# Patient Record
Sex: FEMALE | Race: WHITE | NOT HISPANIC OR LATINO | Employment: PART TIME | URBAN - METROPOLITAN AREA
[De-identification: names, ages, dates, MRNs, and addresses within clinical notes are randomized per-mention and may not be internally consistent; named-entity substitution may affect disease eponyms.]

---

## 2015-08-24 LAB — PAP SMEAR - HIM PATIENT REPORTED: NEGATIVE

## 2017-05-11 ENCOUNTER — OFFICE VISIT (OUTPATIENT)
Dept: FAMILY MEDICINE | Facility: CLINIC | Age: 47
End: 2017-05-11
Payer: COMMERCIAL

## 2017-05-11 VITALS
RESPIRATION RATE: 16 BRPM | TEMPERATURE: 98.6 F | OXYGEN SATURATION: 97 % | SYSTOLIC BLOOD PRESSURE: 142 MMHG | BODY MASS INDEX: 43.09 KG/M2 | WEIGHT: 255 LBS | DIASTOLIC BLOOD PRESSURE: 78 MMHG | HEART RATE: 99 BPM

## 2017-05-11 DIAGNOSIS — M54.2 NECK PAIN: Chronic | ICD-10-CM

## 2017-05-11 DIAGNOSIS — M79.18 MYOFASCIAL PAIN: Chronic | ICD-10-CM

## 2017-05-11 DIAGNOSIS — M79.644 BILATERAL THUMB PAIN: ICD-10-CM

## 2017-05-11 DIAGNOSIS — J98.01 ACUTE BRONCHOSPASM: Primary | ICD-10-CM

## 2017-05-11 DIAGNOSIS — J20.9 ACUTE BRONCHITIS, UNSPECIFIED ORGANISM: ICD-10-CM

## 2017-05-11 DIAGNOSIS — M79.645 BILATERAL THUMB PAIN: ICD-10-CM

## 2017-05-11 DIAGNOSIS — E66.01 MORBID OBESITY DUE TO EXCESS CALORIES (H): ICD-10-CM

## 2017-05-11 DIAGNOSIS — J30.2 SEASONAL ALLERGIC RHINITIS, UNSPECIFIED ALLERGIC RHINITIS TRIGGER: ICD-10-CM

## 2017-05-11 PROCEDURE — 99214 OFFICE O/P EST MOD 30 MIN: CPT | Performed by: PHYSICIAN ASSISTANT

## 2017-05-11 RX ORDER — METHYLPREDNISOLONE 4 MG
TABLET, DOSE PACK ORAL
Qty: 21 TABLET | Refills: 0 | Status: SHIPPED | OUTPATIENT
Start: 2017-05-11 | End: 2017-05-17

## 2017-05-11 RX ORDER — DOXYCYCLINE HYCLATE 100 MG
100 TABLET ORAL 2 TIMES DAILY
Qty: 14 TABLET | Refills: 0 | Status: SHIPPED | OUTPATIENT
Start: 2017-05-11 | End: 2017-07-03

## 2017-05-11 RX ORDER — ALBUTEROL SULFATE 90 UG/1
2 AEROSOL, METERED RESPIRATORY (INHALATION) EVERY 6 HOURS PRN
Qty: 1 INHALER | Refills: 4 | Status: SHIPPED | OUTPATIENT
Start: 2017-05-11 | End: 2018-08-31

## 2017-05-11 RX ORDER — HYDROCODONE BITARTRATE AND ACETAMINOPHEN 5; 325 MG/1; MG/1
1 TABLET ORAL EVERY 6 HOURS PRN
Qty: 30 TABLET | Refills: 0 | Status: SHIPPED | OUTPATIENT
Start: 2017-05-11 | End: 2018-08-31

## 2017-05-11 NOTE — PATIENT INSTRUCTIONS
Bronchospasm (Adult)    Bronchospasm occurs when the airways (bronchial tubes) go into spasm and contract. This makes it hard to breathe and causes wheezing (a high-pitched whistling sound). Bronchospasm can also cause frequent coughing without wheezing.  Bronchospasm is due to irritation, inflammation, or allergic reaction of the airways. People with asthma get bronchospasm. However, not everyone with bronchospasm has asthma.  Being exposed to harmful fumes, a recent case of bronchitis, exercise, or a flare-up of chronic obstructive pulmonary disease (COPD) may cause the airways to spasm. An episode of bronchospasm may last 7 to 14 days. Medicine may be prescribed to relax the airways and prevent wheezing. Antibiotics will be prescribed only if your healthcare provider thinks there is a bacterial infection. Antibiotics do not help a viral infection.  Home care     Drink lots of water or other fluids (at least 10 glasses a day) during an attack. This will loosen lung secretions and make it easier to breathe. If you have heart or kidney disease, check with your doctor before you drink extra fluids.    Take prescribed medicine exactly at the times advised. If you take an inhaled medicine to help with breathing, do not use it more than once every 4 hours, unless told to do so. If prescribed an antibiotic or prednisone, take all of the medicine, even if you are feeling better after a few days.    Do not smoke. Also avoid being exposed to secondhand smoke.    If you were given an inhaler, use it exactly as directed. If you need to use it more often than prescribed, your condition may be getting worse. Contact your healthcare provider.  Follow-up care  Follow up with your healthcare provider, or as advised.   (Note: If you are age 65 or older, have a chronic lung disease or condition that affects your immune system, or you smoke, we recommend getting pneumococcal vaccinations, as well as an influenza vaccination (flu  shot) every autumn. Ask your healthcare provider about this.)  When to seek medical advice  Call your healthcare provider right away if any of these occur:    You need to use your inhalers more often than usual.    You develop a fever of 100.4 F (38 C) or higher.    You are coughing up lots of dark-colored sputum (mucus).    You do not start to improve within 24 hours.  Call 911, or get immediate medical care  Contact emergency services if any of these occur:    Coughing up bloody sputum (mucus)    Chest pain with each breath    Increased wheezing or shortness of breath    2639-5206 [employer]. [address] [place] [address], [place] [address]. All rights reserved. This information is not intended as a substitute for professional medical care. Always follow your healthcare professional's instructions.

## 2017-05-11 NOTE — NURSING NOTE
"Chief Complaint   Patient presents with     URI       Initial /78 (BP Location: Right arm, Patient Position: Chair, Cuff Size: Adult Large)  Pulse 99  Temp 98.6  F (37  C) (Tympanic)  Resp 16  Wt 255 lb (115.7 kg)  SpO2 97%  BMI 43.09 kg/m2 Estimated body mass index is 43.09 kg/(m^2) as calculated from the following:    Height as of 8/31/16: 5' 4.5\" (1.638 m).    Weight as of this encounter: 255 lb (115.7 kg).  Medication Reconciliation: complete    "

## 2017-05-11 NOTE — MR AVS SNAPSHOT
After Visit Summary   5/11/2017    Brenda Renee    MRN: [id number]           Patient Information     Date Of Birth          1970        Visit Information        Provider Department      5/11/2017 2:30 PM Chasidy Bajwa PA-C [facility]        Today's Diagnoses     Acute bronchospasm    -  1    Acute bronchitis, unspecified organism        Seasonal allergic rhinitis, unspecified allergic rhinitis trigger        Neck pain        Myofascial pain        Bilateral thumb pain        Wheezing          Care Instructions      Bronchospasm (Adult)    Bronchospasm occurs when the airways (bronchial tubes) go into spasm and contract. This makes it hard to breathe and causes wheezing (a high-pitched whistling sound). Bronchospasm can also cause frequent coughing without wheezing.  Bronchospasm is due to irritation, inflammation, or allergic reaction of the airways. People with asthma get bronchospasm. However, not everyone with bronchospasm has asthma.  Being exposed to harmful fumes, a recent case of bronchitis, exercise, or a flare-up of chronic obstructive pulmonary disease (COPD) may cause the airways to spasm. An episode of bronchospasm may last 7 to 14 days. Medicine may be prescribed to relax the airways and prevent wheezing. Antibiotics will be prescribed only if your healthcare provider thinks there is a bacterial infection. Antibiotics do not help a viral infection.  Home care     Drink lots of water or other fluids (at least 10 glasses a day) during an attack. This will loosen lung secretions and make it easier to breathe. If you have heart or kidney disease, check with your doctor before you drink extra fluids.    Take prescribed medicine exactly at the times advised. If you take an inhaled medicine to help with breathing, do not use it more than once every 4 hours, unless told to do so. If prescribed an antibiotic or prednisone, take all of the  medicine, even if you are feeling better after a few days.    Do not smoke. Also avoid being exposed to secondhand smoke.    If you were given an inhaler, use it exactly as directed. If you need to use it more often than prescribed, your condition may be getting worse. Contact your healthcare provider.  Follow-up care  Follow up with your healthcare provider, or as advised.   (Note: If you are age 65 or older, have a chronic lung disease or condition that affects your immune system, or you smoke, we recommend getting pneumococcal vaccinations, as well as an influenza vaccination (flu shot) every autumn. Ask your healthcare provider about this.)  When to seek medical advice  Call your healthcare provider right away if any of these occur:    You need to use your inhalers more often than usual.    You develop a fever of 100.4 F (38 C) or higher.    You are coughing up lots of dark-colored sputum (mucus).    You do not start to improve within 24 hours.  Call 911, or get immediate medical care  Contact emergency services if any of these occur:    Coughing up bloody sputum (mucus)    Chest pain with each breath    Increased wheezing or shortness of breath    [id number] [employer]. [address] [place] [address]. All rights reserved. This information is not intended as a substitute for professional medical care. Always follow your healthcare professional's instructions.              Follow-ups after your visit        Who to contact     If you have questions or need follow up information about today's clinic visit or your schedule please contact [facility] directly at [phone].  Normal or non-critical lab and imaging results will be communicated to you by MyChart, letter or phone within 4 business days after the clinic has received the results. If you do not hear from us within 7 days, please contact the clinic through MyChart or phone. If you have a critical or  "abnormal lab result, we will notify you by phone as soon as possible.  Submit refill requests through [employer] or call your pharmacy and they will forward the refill request to us. Please allow 3 business days for your refill to be completed.          Additional Information About Your Visit        [employer] Information     [employer] lets you send messages to your doctor, view your test results, renew your prescriptions, schedule appointments and more. To sign up, go to [email or web address] . Click on \"Log in\" on the left side of the screen, which will take you to the Welcome page. Then click on \"Sign up Now\" on the right side of the page.     You will be asked to enter the access code listed below, as well as some personal information. Please follow the directions to create your username and password.     Your access code is: [email or web address]  Expires: 2017  3:08 PM     Your access code will  in 90 days. If you need help or a new code, please call your [place] clinic or [phone].        Care EveryWhere ID     This is your Care EveryWhere ID. This could be used by other organizations to access your [facility] medical records  [id number]        Your Vitals Were     Pulse Temperature Respirations Pulse Oximetry BMI (Body Mass Index)       99 98.6  F (37  C) (Tympanic) 16 97% 43.09 kg/m2        Blood Pressure from Last 3 Encounters:   17 142/78   16 138/82   11/15/16 154/88    Weight from Last 3 Encounters:   17 255 lb (115.7 kg)   16 250 lb (113.4 kg)   16 248 lb (112.5 kg)              Today, you had the following     No orders found for display         Today's Medication Changes          These changes are accurate as of: 17  4:21 PM.  If you have any questions, ask your nurse or doctor.               Start taking these medicines.        Dose/Directions    doxycycline 100 MG tablet   Commonly known as:  VIBRA-TABS   Used for:  Acute bronchitis, unspecified organism   Started " by:  Chasidy Bajwa PA-C        Dose:  100 mg   Take 1 tablet (100 mg) by mouth 2 times daily   Quantity:  14 tablet   Refills:  0       methylPREDNISolone 4 MG tablet   Commonly known as:  MEDROL DOSEPAK   Used for:  Bilateral thumb pain, Acute bronchitis, unspecified organism   Started by:  Chasidy Bajwa PA-C        Follow package instructions   Quantity:  21 tablet   Refills:  0            Where to get your medicines      These medications were sent to [place] [address] [place] [address] [address]     Phone:  [phone]     albuterol 108 (90 BASE) MCG/ACT Inhaler    doxycycline 100 MG tablet    methylPREDNISolone 4 MG tablet         Some of these will need a paper prescription and others can be bought over the counter.  Ask your nurse if you have questions.     Bring a paper prescription for each of these medications     HYDROcodone-acetaminophen 5-325 MG per tablet                Primary Care Provider Office Phone # Fax #    Chasidy Bajwa PA-C [phone]       [facility] [address]  [facility] [address]        Goals        Diet    Reduce portion size        Exercise    Exercise 3x per week (30 min per time)       Thank you!     Thank you for choosing [facility]  for your care. Our goal is always to provide you with excellent care. Hearing back from our patients is one way we can continue to improve our services. Please take a few minutes to complete the written survey that you may receive in the mail after your visit with us. Thank you!             Your Updated Medication List - Protect others around you: Learn how to safely use, store and throw away your medicines at www.disposemymeds.org.          This list is accurate as of: 5/11/17  4:21 PM.  Always use your most recent med list.                   Brand Name Dispense Instructions for use     albuterol 108 (90 BASE) MCG/ACT Inhaler    albuterol    1 Inhaler    Inhale 2 puffs into the lungs every 6 hours as needed       ALLEGRA 180 MG tablet   Generic drug:  fexofenadine      Take 1 tablet by mouth daily.       doxycycline 100 MG tablet    VIBRA-TABS    14 tablet    Take 1 tablet (100 mg) by mouth 2 times daily       HYDROcodone-acetaminophen 5-325 MG per tablet    NORCO    30 tablet    Take 1 tablet by mouth every 6 hours as needed for moderate to severe pain       ibuprofen 200 MG capsule      Take 200 mg by mouth every 4 hours as needed.       LIDODERM 5 % Patch   Generic drug:  lidocaine          losartan 100 MG tablet    COZAAR    90 tablet    Take 1 tablet (100 mg) by mouth daily       MAGNESIUM GLYCINATE PLUS PO      Take 200 mg by mouth 2 times daily       methylPREDNISolone 4 MG tablet    MEDROL DOSEPAK    21 tablet    Follow package instructions       MULTI-VITAMIN DAILY Tabs      Take  by mouth.       vitamin D 2000 UNITS Caps      Take 2-3 capsules by mouth daily.

## 2017-05-16 ENCOUNTER — TELEPHONE (OUTPATIENT)
Dept: FAMILY MEDICINE | Facility: CLINIC | Age: 47
End: 2017-05-16

## 2017-05-16 NOTE — TELEPHONE ENCOUNTER
Reception transferred tome as she made appt but wanted me to triage for throat swelling.   Patient is on antibiotics- she has stomach upset from antibiotic.   Sweating from prednisone  Has seasonal allergies and ? Cat allergies.  She was visiting someone with cats.   Headaches.   Cough is better but not resolved.  Not coughing up yellow phlegm.  She says her throat hurts.  She talked on and on for 20 minutes so I am not worried about excessive throat swelling.  Her neck is mildly swollen and she is worrying  about mumps.  I told her to put on mask when she enters the building  Ok for her to see [facility] tomorrow.   If increased throat swelling, unable to swallow water go to ED  Jailene Matute RN- Triage [employer]

## 2017-05-17 ENCOUNTER — RADIANT APPOINTMENT (OUTPATIENT)
Dept: GENERAL RADIOLOGY | Facility: CLINIC | Age: 47
End: 2017-05-17
Attending: FAMILY MEDICINE
Payer: COMMERCIAL

## 2017-05-17 ENCOUNTER — OFFICE VISIT (OUTPATIENT)
Dept: FAMILY MEDICINE | Facility: CLINIC | Age: 47
End: 2017-05-17
Payer: COMMERCIAL

## 2017-05-17 VITALS
BODY MASS INDEX: 42.25 KG/M2 | DIASTOLIC BLOOD PRESSURE: 80 MMHG | WEIGHT: 250 LBS | RESPIRATION RATE: 16 BRPM | HEART RATE: 104 BPM | TEMPERATURE: 98 F | OXYGEN SATURATION: 99 % | SYSTOLIC BLOOD PRESSURE: 136 MMHG

## 2017-05-17 DIAGNOSIS — J02.9 ACUTE PHARYNGITIS, UNSPECIFIED ETIOLOGY: Primary | ICD-10-CM

## 2017-05-17 DIAGNOSIS — J06.9 UPPER RESPIRATORY TRACT INFECTION, UNSPECIFIED TYPE: ICD-10-CM

## 2017-05-17 DIAGNOSIS — L98.9 BENIGN SKIN GROWTH: ICD-10-CM

## 2017-05-17 LAB
DEPRECATED S PYO AG THROAT QL EIA: NORMAL
MICRO REPORT STATUS: NORMAL
SPECIMEN SOURCE: NORMAL

## 2017-05-17 PROCEDURE — 99214 OFFICE O/P EST MOD 30 MIN: CPT | Performed by: FAMILY MEDICINE

## 2017-05-17 PROCEDURE — 87081 CULTURE SCREEN ONLY: CPT | Performed by: FAMILY MEDICINE

## 2017-05-17 PROCEDURE — 87880 STREP A ASSAY W/OPTIC: CPT | Performed by: FAMILY MEDICINE

## 2017-05-17 PROCEDURE — 71020 XR CHEST 2 VW: CPT

## 2017-05-17 RX ORDER — CODEINE PHOSPHATE AND GUAIFENESIN 10; 100 MG/5ML; MG/5ML
1 SOLUTION ORAL EVERY 4 HOURS PRN
Qty: 120 ML | Refills: 1 | Status: SHIPPED | OUTPATIENT
Start: 2017-05-17 | End: 2017-07-03

## 2017-05-17 NOTE — MR AVS SNAPSHOT
"              After Visit Summary   5/17/2017    Brenda Renee    MRN: [id number]           Patient Information     Date Of Birth          1970        Visit Information        Provider Department      5/17/2017 2:00 PM Dario Matta MD [facility]        Today's Diagnoses     Acute pharyngitis, unspecified etiology    -  1    Upper respiratory tract infection, unspecified type        Benign skin growth          Care Instructions    We'll continue with her albuterol inhaler.  I will send in a prescription for her for some codeine cough syrup.  Will treat symptomatically otherwise.  She can use ibuprofen/Tylenol for her sore throat.  Salt water gargles.  Hard lozenges to suck on.  We'll see back if she's not improving.        Follow-ups after your visit        Who to contact     If you have questions or need follow up information about today's clinic visit or your schedule please contact [facility] directly at [phone].  Normal or non-critical lab and imaging results will be communicated to you by [employer], letter or phone within 4 business days after the clinic has received the results. If you do not hear from us within 7 days, please contact the clinic through "[employer]t or phone. If you have a critical or abnormal lab result, we will notify you by phone as soon as possible.  Submit refill requests through [employer] or call your pharmacy and they will forward the refill request to us. Please allow 3 business days for your refill to be completed.          Additional Information About Your Visit        [employer] Information     [employer] lets you send messages to your doctor, view your test results, renew your prescriptions, schedule appointments and more. To sign up, go to [email or web address] . Click on \"Log in\" on the left side of the screen, which will take you to the Welcome page. Then click on \"Sign up Now\" on the right side of the " page.     You will be asked to enter the access code listed below, as well as some personal information. Please follow the directions to create your username and password.     Your access code is: [email or web address]  Expires: 8/15/2017  3:58 PM     Your access code will  in 90 days. If you need help or a new code, please call your [facility] clinic or [phone].        Care EveryWhere ID     This is your Care EveryWhere ID. This could be used by other organizations to access your [facility] medical records  [id number]        Your Vitals Were     Pulse Temperature Respirations Pulse Oximetry BMI (Body Mass Index)       104 98  F (36.7  C) (Tympanic) 16 99% 42.25 kg/m2        Blood Pressure from Last 3 Encounters:   17 136/80   17 142/78   16 138/82    Weight from Last 3 Encounters:   17 250 lb (113.4 kg)   17 255 lb (115.7 kg)   16 250 lb (113.4 kg)              We Performed the Following     Beta strep group A culture     Strep, Rapid Screen          Today's Medication Changes          These changes are accurate as of: 17 11:59 PM.  If you have any questions, ask your nurse or doctor.               Start taking these medicines.        Dose/Directions    guaiFENesin-codeine 100-10 MG/5ML Soln solution   Commonly known as:  ROBITUSSIN AC   Used for:  Upper respiratory tract infection, unspecified type   Started by:  Dario Matta MD        Dose:  1 tsp.   Take 5 mLs by mouth every 4 hours as needed for cough   Quantity:  120 mL   Refills:  1            Where to get your medicines      Some of these will need a paper prescription and others can be bought over the counter.  Ask your nurse if you have questions.     Bring a paper prescription for each of these medications     guaiFENesin-codeine 100-10 MG/5ML Soln solution                Primary Care Provider Office Phone # Fax #    Chasidy Bajwa PA-C [phone]       [facility]  [address]  [facility] [address]        Goals        Diet    Reduce portion size        Exercise    Exercise 3x per week (30 min per time)       Thank you!     Thank you for choosing [facility]  for your care. Our goal is always to provide you with excellent care. Hearing back from our patients is one way we can continue to improve our services. Please take a few minutes to complete the written survey that you may receive in the mail after your visit with us. Thank you!             Your Updated Medication List - Protect others around you: Learn how to safely use, store and throw away your medicines at www.disposemymeds.org.          This list is accurate as of: 5/17/17 11:59 PM.  Always use your most recent med list.                   Brand Name Dispense Instructions for use    albuterol 108 (90 BASE) MCG/ACT Inhaler    albuterol    1 Inhaler    Inhale 2 puffs into the lungs every 6 hours as needed       ALLEGRA 180 MG tablet   Generic drug:  fexofenadine      Take 1 tablet by mouth daily.       doxycycline 100 MG tablet    VIBRA-TABS    14 tablet    Take 1 tablet (100 mg) by mouth 2 times daily       guaiFENesin-codeine 100-10 MG/5ML Soln solution    ROBITUSSIN AC    120 mL    Take 5 mLs by mouth every 4 hours as needed for cough       HYDROcodone-acetaminophen 5-325 MG per tablet    NORCO    30 tablet    Take 1 tablet by mouth every 6 hours as needed for moderate to severe pain       ibuprofen 200 MG capsule      Take 200 mg by mouth every 4 hours as needed.       LIDODERM 5 % Patch   Generic drug:  lidocaine          losartan 100 MG tablet    COZAAR    90 tablet    Take 1 tablet (100 mg) by mouth daily       MAGNESIUM GLYCINATE PLUS PO      Take 200 mg by mouth 2 times daily       MULTI-VITAMIN DAILY Tabs      Take  by mouth.       vitamin D 2000 UNITS Caps      Take 2-3 capsules by mouth daily.

## 2017-05-17 NOTE — PATIENT INSTRUCTIONS
We'll continue with her albuterol inhaler.  I will send in a prescription for her for some codeine cough syrup.  Will treat symptomatically otherwise.  She can use ibuprofen/Tylenol for her sore throat.  Salt water gargles.  Hard lozenges to suck on.  We'll see back if she's not improving.

## 2017-05-17 NOTE — PROGRESS NOTES
SUBJECTIVE:                                                    Brenda Renee is a 47 year old female who presents to clinic today for the following health issues:      RESPIRATORY SYMPTOMS      Duration: 2 weeks    Description  sore throat, cough and myalgias    Severity: moderate    Accompanying signs and symptoms: None    History (predisposing factors):  environmental allergies, asthma    Precipitating or alleviating factors: lying down, cough worse at night    Therapies tried and outcome:  Medrol dose pack, doxycycline, allegra for allergies       Musculoskeletal problem/pain      Duration: 2 days    Description  Location: thumbs    Intensity:  moderate    Accompanying signs and symptoms: none    History  Previous similar problem: YES  Previous evaluation:  none    Precipitating or alleviating factors:  Trauma or overuse: no   Aggravating factors include: none    Therapies tried and outcome: was on medrol and felt pretty good       Problem list and histories reviewed & adjusted, as indicated.  Additional history: She is now taking her albuterol inhaler and her breathing has been much better.  Her thumbs actually felt very good while she is on the larger beginning doses of her Medrol Dosepak.  However now they have gone back to being sore again.  She also has a spot on her right anterior shin and she would like examined.  She has not been to the dermatologist yet.    Patient Active Problem List   Diagnosis     Plantar fascial fibromatosis     Rectal tear (H)     Neck pain     Myofascial pain     Microhematuria     Morbid obesity due to excess calories (H)     Dysmenorrhea     Perimenopausal     Essential hypertension with goal blood pressure less than 140/90     Abnormal fasting glucose     Bilateral low back pain without sciatica, unspecified chronicity     Chronic pain syndrome     Contact sensitivity to metal, current reaction     Bilateral hand numbness     Seasonal allergic rhinitis, unspecified  allergic rhinitis trigger     Bilateral thumb pain     Past Surgical History:   Procedure Laterality Date     BREAST BIOPSY, RT/LT  11/10/2008 Abbott/Piper - negative, 05/26/2010 Abbott/Piper benign    right x 2 benign     COLONOSCOPY  1/7/2013    Procedure: COLONOSCOPY;  Colonoscopy;  Surgeon: Veronica Meyers MD;  Location: [facility]     HC TOOTH EXTRACTION W/FORCEP  1999    wisdom teeth     TONSILLECTOMY  1999       Social History   Substance Use Topics     Smoking status: Never Smoker     Smokeless tobacco: Never Used     Alcohol use Yes      Comment: 2-3 per month     Family History   Problem Relation Age of Onset     Hypertension Mother      Arthritis Mother      Hypertension Father      Family History Negative Brother      Arthritis Sister      DIABETES Sister      Neurologic Disorder Sister      Family History Negative Brother      Family History Negative Sister            Reviewed and updated as needed this visit by clinical staff  Tobacco  Allergies  Meds  Med Hx  Surg Hx  Fam Hx  Soc Hx      Reviewed and updated as needed this visit by Provider         ROS:  CONSTITUTIONAL:NEGATIVE for fever, chills, change in weight  INTEGUMENTARY/SKIN: POSITIVE for lumps or bumps right shin  RESP:POSITIVE for cough-non productive and negative for wheezing.  CV: NEGATIVE for chest pain, palpitations or peripheral edema    OBJECTIVE:                                                    /80  Pulse 104  Temp 98  F (36.7  C) (Tympanic)  Resp 16  Wt 250 lb (113.4 kg)  SpO2 99%  BMI 42.25 kg/m2  Body mass index is 42.25 kg/(m^2).  GENERAL APPEARANCE: healthy, alert and no distress  EYES: Eyes grossly normal to inspection, PERRL and conjunctivae and sclerae normal  HENT: ear canals and TM's normal and nose and mouth without ulcers or lesions  NECK: no adenopathy and no asymmetry, masses, or scars  RESP: lungs clear to auscultation - no rales, rhonchi or wheezes  CV: regular rates and rhythm, normal S1 S2, no S3  or S4 and no murmur, click or rub  LYMPHATICS: normal ant/post cervical and supraclavicular nodes  SKIN: There is a very benign-appearing 3 mm slightly raised flesh-colored mole mid shin on the right.  This looks completely benign.    Diagnostic test results:  Results for orders placed or performed in visit on 05/17/17 (from the past 24 hour(s))   Strep, Rapid Screen   Result Value Ref Range    Specimen Description Throat     Rapid Strep A Screen       NEGATIVE: No Group A streptococcal antigen detected by immunoassay, await   culture report.      Micro Report Status FINAL 05/17/2017      CXR - negative to my reading.       ASSESSMENT/PLAN:                                                        ICD-10-CM    1. Acute pharyngitis, unspecified etiology J02.9 Strep, Rapid Screen     Beta strep group A culture   2. Upper respiratory tract infection, unspecified type J06.9 XR Chest 2 Views   3. Benign skin growth L98.9     Right shin       Patient Instructions   We'll continue with her albuterol inhaler.  I will send in a prescription for her for some codeine cough syrup.  Will treat symptomatically otherwise.  She can use ibuprofen/Tylenol for her sore throat.  Salt water gargles.  Hard lozenges to suck on.  We'll see back if she's not improving.      Dario Matta MD  [facility]

## 2017-05-18 LAB
BACTERIA SPEC CULT: NORMAL
MICRO REPORT STATUS: NORMAL
SPECIMEN SOURCE: NORMAL
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
## 2017-07-05 ENCOUNTER — OFFICE VISIT (OUTPATIENT)
Dept: FAMILY MEDICINE | Facility: CLINIC | Age: 47
End: 2017-07-05
Payer: COMMERCIAL

## 2017-07-05 VITALS
BODY MASS INDEX: 42.15 KG/M2 | HEART RATE: 76 BPM | OXYGEN SATURATION: 98 % | SYSTOLIC BLOOD PRESSURE: 124 MMHG | HEIGHT: 65 IN | DIASTOLIC BLOOD PRESSURE: 70 MMHG | RESPIRATION RATE: 16 BRPM | TEMPERATURE: 99.1 F | WEIGHT: 253 LBS

## 2017-07-05 DIAGNOSIS — M79.645 BILATERAL THUMB PAIN: ICD-10-CM

## 2017-07-05 DIAGNOSIS — R79.89 ELEVATED LFTS: ICD-10-CM

## 2017-07-05 DIAGNOSIS — Z00.00 ROUTINE GENERAL MEDICAL EXAMINATION AT A HEALTH CARE FACILITY: Primary | ICD-10-CM

## 2017-07-05 DIAGNOSIS — Z51.81 ENCOUNTER FOR THERAPEUTIC DRUG MONITORING: ICD-10-CM

## 2017-07-05 DIAGNOSIS — R73.01 ABNORMAL FASTING GLUCOSE: ICD-10-CM

## 2017-07-05 DIAGNOSIS — M79.18 MYOFASCIAL PAIN: Chronic | ICD-10-CM

## 2017-07-05 DIAGNOSIS — M79.644 BILATERAL THUMB PAIN: ICD-10-CM

## 2017-07-05 DIAGNOSIS — E66.01 MORBID OBESITY DUE TO EXCESS CALORIES (H): ICD-10-CM

## 2017-07-05 DIAGNOSIS — M72.2 PLANTAR FASCIAL FIBROMATOSIS: Chronic | ICD-10-CM

## 2017-07-05 DIAGNOSIS — I10 ESSENTIAL HYPERTENSION WITH GOAL BLOOD PRESSURE LESS THAN 140/90: Chronic | ICD-10-CM

## 2017-07-05 DIAGNOSIS — M54.2 NECK PAIN: Chronic | ICD-10-CM

## 2017-07-05 LAB
ALBUMIN SERPL-MCNC: 3.6 G/DL (ref 3.4–5)
ALBUMIN UR-MCNC: NEGATIVE MG/DL
ALP SERPL-CCNC: 107 U/L (ref 40–150)
ALT SERPL W P-5'-P-CCNC: 64 U/L (ref 0–50)
ANION GAP SERPL CALCULATED.3IONS-SCNC: 10 MMOL/L (ref 3–14)
APPEARANCE UR: CLEAR
AST SERPL W P-5'-P-CCNC: 49 U/L (ref 0–45)
BILIRUB SERPL-MCNC: 0.5 MG/DL (ref 0.2–1.3)
BILIRUB UR QL STRIP: NEGATIVE
BUN SERPL-MCNC: 10 MG/DL (ref 7–30)
CALCIUM SERPL-MCNC: 8.8 MG/DL (ref 8.5–10.1)
CHLORIDE SERPL-SCNC: 107 MMOL/L (ref 94–109)
CHOLEST SERPL-MCNC: 155 MG/DL
CO2 SERPL-SCNC: 23 MMOL/L (ref 20–32)
COLOR UR AUTO: YELLOW
CREAT SERPL-MCNC: 0.85 MG/DL (ref 0.52–1.04)
GFR SERPL CREATININE-BSD FRML MDRD: 71 ML/MIN/1.7M2
GLUCOSE SERPL-MCNC: 151 MG/DL (ref 70–99)
GLUCOSE UR STRIP-MCNC: NEGATIVE MG/DL
HBA1C MFR BLD: 7 % (ref 4.3–6)
HDLC SERPL-MCNC: 42 MG/DL
HGB UR QL STRIP: ABNORMAL
KETONES UR STRIP-MCNC: NEGATIVE MG/DL
LDLC SERPL CALC-MCNC: 91 MG/DL
LEUKOCYTE ESTERASE UR QL STRIP: NEGATIVE
NITRATE UR QL: NEGATIVE
NON-SQ EPI CELLS #/AREA URNS LPF: NORMAL /LPF
NONHDLC SERPL-MCNC: 113 MG/DL
PH UR STRIP: 5.5 PH (ref 5–7)
POTASSIUM SERPL-SCNC: 4.1 MMOL/L (ref 3.4–5.3)
PROT SERPL-MCNC: 7.6 G/DL (ref 6.8–8.8)
RBC #/AREA URNS AUTO: NORMAL /HPF (ref 0–2)
SODIUM SERPL-SCNC: 140 MMOL/L (ref 133–144)
SP GR UR STRIP: 1.02 (ref 1–1.03)
TRIGL SERPL-MCNC: 110 MG/DL
URN SPEC COLLECT METH UR: ABNORMAL
UROBILINOGEN UR STRIP-ACNC: 0.2 EU/DL (ref 0.2–1)
WBC #/AREA URNS AUTO: NORMAL /HPF (ref 0–2)

## 2017-07-05 PROCEDURE — 80053 COMPREHEN METABOLIC PANEL: CPT | Performed by: PHYSICIAN ASSISTANT

## 2017-07-05 PROCEDURE — 80061 LIPID PANEL: CPT | Performed by: PHYSICIAN ASSISTANT

## 2017-07-05 PROCEDURE — 99213 OFFICE O/P EST LOW 20 MIN: CPT | Mod: 25 | Performed by: PHYSICIAN ASSISTANT

## 2017-07-05 PROCEDURE — 36415 COLL VENOUS BLD VENIPUNCTURE: CPT | Performed by: PHYSICIAN ASSISTANT

## 2017-07-05 PROCEDURE — 83036 HEMOGLOBIN GLYCOSYLATED A1C: CPT | Performed by: PHYSICIAN ASSISTANT

## 2017-07-05 PROCEDURE — 81001 URINALYSIS AUTO W/SCOPE: CPT | Performed by: PHYSICIAN ASSISTANT

## 2017-07-05 PROCEDURE — 99396 PREV VISIT EST AGE 40-64: CPT | Performed by: PHYSICIAN ASSISTANT

## 2017-07-05 RX ORDER — LOSARTAN POTASSIUM 100 MG/1
100 TABLET ORAL DAILY
Qty: 90 TABLET | Refills: 3 | Status: SHIPPED | OUTPATIENT
Start: 2017-07-05 | End: 2018-08-30

## 2017-07-05 RX ORDER — HYDROCODONE BITARTRATE AND ACETAMINOPHEN 5; 325 MG/1; MG/1
1 TABLET ORAL EVERY 6 HOURS PRN
Qty: 30 TABLET | Refills: 0 | Status: CANCELLED | OUTPATIENT
Start: 2017-07-05

## 2017-07-05 RX ORDER — CYCLOBENZAPRINE HCL 10 MG
10 TABLET ORAL 3 TIMES DAILY PRN
Qty: 30 TABLET | Refills: 5 | Status: SHIPPED | OUTPATIENT
Start: 2017-07-05 | End: 2018-08-31

## 2017-07-05 NOTE — MR AVS SNAPSHOT
After Visit Summary   7/5/2017    Brenda Renee    MRN: [id number]           Patient Information     Date Of Birth          1970        Visit Information        Provider Department      7/5/2017 9:50 AM Chasidy Bajwa PA-C [facility]        Today's Diagnoses     Routine general medical examination at a health care facility    -  1    Essential hypertension with goal blood pressure less than 140/90        Morbid obesity due to excess calories (H)        Neck pain        Myofascial pain        Bilateral thumb pain        Encounter for therapeutic drug monitoring        Plantar fascial fibromatosis          Care Instructions      Preventive Health Recommendations  Female Ages 40 to 49    Yearly exam:     See your health care provider every year in order to  1. Review health changes.   2. Discuss preventive care.    3. Review your medicines if your doctor prescribed any.      Get a Pap test every three years (unless you have an abnormal result and your provider advises testing more often).      If you get Pap tests with HPV test, you only need to test every 5 years, unless you have an abnormal result. You do not need a Pap test if your uterus was removed (hysterectomy) and you have not had cancer.      You should be tested each year for STDs (sexually transmitted diseases), if you're at risk.       Ask your doctor if you should have a mammogram.      Have a colonoscopy (test for colon cancer) if someone in your family has had colon cancer or polyps before age 50.       Have a cholesterol test every 5 years.       Have a diabetes test (fasting glucose) after age 45. If you are at risk for diabetes, you should have this test every 3 years.    Shots: Get a flu shot each year. Get a tetanus shot every 10 years.     Nutrition:     Eat at least 5 servings of fruits and vegetables each day.    Eat whole-grain bread, whole-wheat pasta and brown rice instead  of white grains and rice.    Talk to your provider about Calcium and Vitamin D.     Lifestyle    Exercise at least 150 minutes a week (an average of 30 minutes a day, 5 days a week). This will help you control your weight and prevent disease.    Limit alcohol to one drink per day.    No smoking.     Wear sunscreen to prevent skin cancer.    See your dentist every six months for an exam and cleaning.          Follow-ups after your visit        Additional Services     ORTHOPEDICS ADULT REFERRAL       Your provider has referred you to: [facility]: [facility]  [phone]   [email or web address]  [facility] [place] [phone]   [email or web address]    Please be aware that coverage of these services is subject to the terms and limitations of your health insurance plan.  Call member services at your health plan with any benefit or coverage questions.      Please bring the following to your appointment:    >>   Any x-rays, CTs or MRIs which have been performed.  Contact the facility where they were done to arrange for  prior to your scheduled appointment.    >>   List of current medications   >>   This referral request   >>   Any documents/labs given to you for this referral                  Who to contact     If you have questions or need follow up information about today's clinic visit or your schedule please contact [facility] directly at [phone].  Normal or non-critical lab and imaging results will be communicated to you by MyChart, letter or phone within 4 business days after the clinic has received the results. If you do not hear from us within 7 days, please contact the clinic through MyChart or phone. If you have a critical or abnormal lab result, we will notify you by phone as soon as possible.  Submit refill  "requests through [employer] or call your pharmacy and they will forward the refill request to us. Please allow 3 business days for your refill to be completed.          Additional Information About Your Visit        [employer] Information     [employer] lets you send messages to your doctor, view your test results, renew your prescriptions, schedule appointments and more. To sign up, go to [email or web address] . Click on \"Log in\" on the left side of the screen, which will take you to the Welcome page. Then click on \"Sign up Now\" on the right side of the page.     You will be asked to enter the access code listed below, as well as some personal information. Please follow the directions to create your username and password.     Your access code is: [email or web address]  Expires: 8/15/2017  3:58 PM     Your access code will  in 90 days. If you need help or a new code, please call your [place] clinic or [phone].        Care EveryWhere ID     This is your Care EveryWhere ID. This could be used by other organizations to access your [place] medical records  [id number]        Your Vitals Were     Pulse Temperature Respirations Height Pulse Oximetry BMI (Body Mass Index)    76 99.1  F (37.3  C) (Tympanic) 16 5' 4.5\" (1.638 m) 98% 42.76 kg/m2       Blood Pressure from Last 3 Encounters:   17 124/70   17 136/80   17 142/78    Weight from Last 3 Encounters:   17 253 lb (114.8 kg)   17 250 lb (113.4 kg)   17 255 lb (115.7 kg)              We Performed the Following     Comprehensive metabolic panel     Hemoglobin A1c     LIPID REFLEX TO DIRECT LDL PANEL     ORTHOPEDICS ADULT REFERRAL     UA reflex to Microscopic     Urine Microscopic          Today's Medication Changes          These changes are accurate as of: 17 11:35 AM.  If you have any questions, ask your nurse or doctor.               Start taking these medicines.        Dose/Directions    cyclobenzaprine 10 MG tablet   Commonly " known as:  FLEXERIL   Used for:  Neck pain, Myofascial pain, Plantar fascial fibromatosis   Started by:  Chasidy Bajwa PA-C        Dose:  10 mg   Take 1 tablet (10 mg) by mouth 3 times daily as needed   Quantity:  30 tablet   Refills:  5         Stop taking these medicines if you haven't already. Please contact your care team if you have questions.     doxycycline 100 MG tablet   Commonly known as:  VIBRA-TABS   Stopped by:  Chasidy Bajwa PA-C           guaiFENesin-codeine 100-10 MG/5ML Soln solution   Commonly known as:  ROBITUSSIN AC   Stopped by:  Chasidy Bajwa PA-C                Where to get your medicines      These medications were sent to [place] [address] [place] [address] [address] [place] [address] [facility] [address]     Phone:  [phone]     cyclobenzaprine 10 MG tablet    losartan 100 MG tablet                Primary Care Provider Office Phone # Fax #    Chasidy Bajwa PA-C [phone]       [facility] [address] [facility] [address]  [facility] [address]        Goals        Diet    Reduce portion size        Exercise    Exercise 3x per week (30 min per time)       Equal Access to Services     KAREL GAINES : Hadii aad ku hadasho Soomaali, waaxda luqadaha, qaybta kaalmada adeegyada, waxay idiin hayaan adedee dee kharahair laana luisa manuel. So [facility] [phone].    ATENCIÓN: Si habla español, tiene a siu disposición servicios gratuitos de asistencia lingüística. Llame al [phone].    We comply with applicable federal civil rights laws and [place] laws. We do not discriminate on the basis of race, color, national origin, age, disability sex, sexual orientation or gender identity.            Thank you!     Thank you for choosing [facility]  for your care. Our goal is always to provide you with excellent care. Hearing back from our patients is one way we can continue to improve our  services. Please take a few minutes to complete the written survey that you may receive in the mail after your visit with us. Thank you!             Your Updated Medication List - Protect others around you: Learn how to safely use, store and throw away your medicines at www.disposemymeds.org.          This list is accurate as of: 7/5/17 11:35 AM.  Always use your most recent med list.                   Brand Name Dispense Instructions for use Diagnosis    albuterol 108 (90 BASE) MCG/ACT Inhaler    albuterol    1 Inhaler    Inhale 2 puffs into the lungs every 6 hours as needed    Acute bronchospasm       ALLEGRA 180 MG tablet   Generic drug:  fexofenadine      Take 1 tablet by mouth daily.    Somatic dysfunction of lower extremities, Somatic dysfunction of sacral region, Somatic dysfunction of pelvic region, Somatic dysfunction of lumbar region, Somatic dysfunction of thoracic region, Segmental and somatic dysfunction of rib cage, Somatic dysfunction of upper extremities, Somatic dysfunction of cervical region, Somatic dysfunction of spine affecting head region       cyclobenzaprine 10 MG tablet    FLEXERIL    30 tablet    Take 1 tablet (10 mg) by mouth 3 times daily as needed    Neck pain, Myofascial pain, Plantar fascial fibromatosis       HYDROcodone-acetaminophen 5-325 MG per tablet    NORCO    30 tablet    Take 1 tablet by mouth every 6 hours as needed for moderate to severe pain    Neck pain, Myofascial pain       ibuprofen 200 MG capsule      Take 200 mg by mouth every 4 hours as needed.        LIDODERM 5 % Patch   Generic drug:  lidocaine           losartan 100 MG tablet    COZAAR    90 tablet    Take 1 tablet (100 mg) by mouth daily    Essential hypertension with goal blood pressure less than 140/90       MAGNESIUM GLYCINATE PLUS PO      Take 200 mg by mouth 2 times daily        MULTI-VITAMIN DAILY Tabs      Take  by mouth.        vitamin D 2000 UNITS Caps      Take 2-3 capsules by mouth daily.

## 2017-07-05 NOTE — LETTER
[facility]  [address]  [facility] [id number] [phone]                                                                                                           Brenda Renee  [address] [place] [address]    July 7, 2017      Dear Brenda,    The results of your recent tests were reviewed and are enclosed.     - Your Cholesterol is normal.  - Your metabolic panel shows:  normal electrolytes (sodium, potassium, calcium) normal kidney function (creatinine and GFR), slightly high liver enzymes (AST/ALT) and a HIGH blood sugar.  - Hemoglobin A1c is a test shows your blood sugar level over the last 2-3 months. A normal level for someone who does not have diabetes is 4-6 (fasting blood sugar <126) - Your A1c is HIGH.  -Urine shows a small amount of blood but is otherwise normal.    - We'll recheck both your A1c and liver enzymes in about 3 months.  If your A1c is 6.5 or higher on repeat, that does give you a diagnosis of Type 2 Diabetes so you want to work really hard on decreasing sugars and carbohydrates from your diet and increasing physical activity in the next few months.    Results for orders placed or performed in visit on 07/05/17   LIPID REFLEX TO DIRECT LDL PANEL   Result Value Ref Range    Cholesterol 155 <200 mg/dL    Triglycerides 110 <150 mg/dL    HDL Cholesterol 42 (L) >49 mg/dL    LDL Cholesterol Calculated 91 <100 mg/dL    Non HDL Cholesterol 113 <130 mg/dL   UA reflex to Microscopic   Result Value Ref Range    Color Urine Yellow     Appearance Urine Clear     Glucose Urine Negative NEG mg/dL    Bilirubin Urine Negative NEG    Ketones Urine Negative NEG mg/dL    Specific Gravity Urine 1.020 1.003 - 1.035    Blood Urine Small (A) NEG    pH Urine 5.5 5.0 - 7.0 pH    Protein Albumin Urine Negative NEG mg/dL    Urobilinogen Urine 0.2 0.2 - 1.0 EU/dL    Nitrite Urine Negative NEG    Leukocyte Esterase Urine Negative NEG     Source Midstream Urine    Comprehensive metabolic panel   Result Value Ref Range    Sodium 140 133 - 144 mmol/L    Potassium 4.1 3.4 - 5.3 mmol/L    Chloride 107 94 - 109 mmol/L    Carbon Dioxide 23 20 - 32 mmol/L    Anion Gap 10 3 - 14 mmol/L    Glucose 151 (H) 70 - 99 mg/dL    Urea Nitrogen 10 7 - 30 mg/dL    Creatinine 0.85 0.52 - 1.04 mg/dL    GFR Estimate 71 >60 mL/min/1.7m2    GFR Estimate If Black 86 >60 mL/min/1.7m2    Calcium 8.8 8.5 - 10.1 mg/dL    Bilirubin Total 0.5 0.2 - 1.3 mg/dL    Albumin 3.6 3.4 - 5.0 g/dL    Protein Total 7.6 6.8 - 8.8 g/dL    Alkaline Phosphatase 107 40 - 150 U/L    ALT 64 (H) 0 - 50 U/L    AST 49 (H) 0 - 45 U/L   Hemoglobin A1c   Result Value Ref Range    Hemoglobin A1C 7.0 (H) 4.3 - 6.0 %   Urine Microscopic   Result Value Ref Range    WBC Urine O - 2 0 - 2 /HPF    RBC Urine O - 2 0 - 2 /HPF    Squamous Epithelial /LPF Urine Few FEW /LPF     Thank you for choosing [facility].  We appreciate the opportunity to serve you and look forward to supporting your healthcare needs in the future.    If you have any questions or concerns, please call me or my staff at [phone].      Sincerely,        Chasidy Bajwa PA-C

## 2017-07-10 ENCOUNTER — TELEPHONE (OUTPATIENT)
Dept: FAMILY MEDICINE | Facility: CLINIC | Age: 47
End: 2017-07-10

## 2017-07-10 DIAGNOSIS — R73.01 ABNORMAL FASTING GLUCOSE: Primary | ICD-10-CM

## 2017-07-10 NOTE — LETTER
[facility]  [address]  [facility] [address]  [phone]                                                                                                           Brenda Renee  [address] [place] [address]    July 12, 2017          Dear Brenda Renee,      Please see the attached contact information to call and schedule your pre-diabetes education class.                Sincerely,    JAY Azul

## 2017-07-10 NOTE — TELEPHONE ENCOUNTER
Left message for pt to return call about lab results.  If calls back, can tell the following:    Hemoglobin A1c is a test shows your blood sugar level over the last 2-3 months. A normal level for someone who does not have diabetes is 4-6 (fasting blood sugar <126) - Your A1c is HIGH at 7.0.  Two values of 6.5% or higher indicates diabetes.  I would like to recheck this in about 3 months or mid Nov when I return from maternity leave.  In the meantime, I want her to work really hard on decreasing sugars and carbs from her diet and increasing physical activity.    Her liver enzymes are also a little high so we will also recheck those at that time.

## 2017-07-12 NOTE — TELEPHONE ENCOUNTER
Unable to reach patient by phone this morning; unable to reach via work number and demographics does say she is unemployed.  Do you want to send a letter to her?

## 2017-07-12 NOTE — TELEPHONE ENCOUNTER
I already did, I just hoped to talk to her about the results first.  She is not unemployed, she works two part time jobs.

## 2017-07-12 NOTE — TELEPHONE ENCOUNTER
Message left on answering machine asking patient to call triage for provider msg.  There is note that patient is not employed so no attempt to reach her at a work number.

## 2017-10-07 ENCOUNTER — HEALTH MAINTENANCE LETTER (OUTPATIENT)
Age: 47
End: 2017-10-07

## 2017-12-20 ENCOUNTER — TRANSFERRED RECORDS (OUTPATIENT)
Dept: HEALTH INFORMATION MANAGEMENT | Facility: CLINIC | Age: 47
End: 2017-12-20

## 2018-08-01 ENCOUNTER — TRANSFERRED RECORDS (OUTPATIENT)
Dept: HEALTH INFORMATION MANAGEMENT | Facility: CLINIC | Age: 48
End: 2018-08-01

## 2018-08-01 LAB — PAP SMEAR - HIM PATIENT REPORTED: NEGATIVE

## 2018-08-31 ENCOUNTER — OFFICE VISIT (OUTPATIENT)
Dept: FAMILY MEDICINE | Facility: CLINIC | Age: 48
End: 2018-08-31
Payer: COMMERCIAL

## 2018-08-31 VITALS
WEIGHT: 230 LBS | RESPIRATION RATE: 14 BRPM | BODY MASS INDEX: 39.27 KG/M2 | SYSTOLIC BLOOD PRESSURE: 138 MMHG | HEIGHT: 64 IN | DIASTOLIC BLOOD PRESSURE: 74 MMHG | OXYGEN SATURATION: 97 % | TEMPERATURE: 97.9 F | HEART RATE: 82 BPM

## 2018-08-31 DIAGNOSIS — S36.69XA RECTAL TEAR: ICD-10-CM

## 2018-08-31 DIAGNOSIS — R31.9 HEMATURIA, UNSPECIFIED TYPE: ICD-10-CM

## 2018-08-31 DIAGNOSIS — R06.83 SNORING: ICD-10-CM

## 2018-08-31 DIAGNOSIS — J98.01 ACUTE BRONCHOSPASM: ICD-10-CM

## 2018-08-31 DIAGNOSIS — M54.2 NECK PAIN: Chronic | ICD-10-CM

## 2018-08-31 DIAGNOSIS — R73.01 ABNORMAL FASTING GLUCOSE: ICD-10-CM

## 2018-08-31 DIAGNOSIS — M72.2 PLANTAR FASCIAL FIBROMATOSIS: Chronic | ICD-10-CM

## 2018-08-31 DIAGNOSIS — Z00.00 ROUTINE GENERAL MEDICAL EXAMINATION AT A HEALTH CARE FACILITY: Primary | ICD-10-CM

## 2018-08-31 DIAGNOSIS — B37.31 YEAST INFECTION OF THE VAGINA: ICD-10-CM

## 2018-08-31 DIAGNOSIS — E55.9 VITAMIN D DEFICIENCY: ICD-10-CM

## 2018-08-31 DIAGNOSIS — M79.18 MYOFASCIAL PAIN: Chronic | ICD-10-CM

## 2018-08-31 DIAGNOSIS — I10 ESSENTIAL HYPERTENSION WITH GOAL BLOOD PRESSURE LESS THAN 140/90: Chronic | ICD-10-CM

## 2018-08-31 DIAGNOSIS — G89.4 CHRONIC PAIN SYNDROME: ICD-10-CM

## 2018-08-31 DIAGNOSIS — E11.65 TYPE 2 DIABETES MELLITUS WITH HYPERGLYCEMIA, WITHOUT LONG-TERM CURRENT USE OF INSULIN (H): ICD-10-CM

## 2018-08-31 DIAGNOSIS — R25.2 CRAMP OF LIMB: ICD-10-CM

## 2018-08-31 DIAGNOSIS — E66.01 MORBID OBESITY DUE TO EXCESS CALORIES (H): ICD-10-CM

## 2018-08-31 DIAGNOSIS — N95.1 MENOPAUSAL SYNDROME (HOT FLASHES): ICD-10-CM

## 2018-08-31 DIAGNOSIS — R07.89 ATYPICAL CHEST PAIN: ICD-10-CM

## 2018-08-31 LAB
ALBUMIN SERPL-MCNC: 3.7 G/DL (ref 3.4–5)
ALBUMIN UR-MCNC: NEGATIVE MG/DL
ALP SERPL-CCNC: 125 U/L (ref 40–150)
ALT SERPL W P-5'-P-CCNC: 123 U/L (ref 0–50)
ANION GAP SERPL CALCULATED.3IONS-SCNC: 10 MMOL/L (ref 3–14)
APPEARANCE UR: CLEAR
AST SERPL W P-5'-P-CCNC: 92 U/L (ref 0–45)
BILIRUB SERPL-MCNC: 0.5 MG/DL (ref 0.2–1.3)
BILIRUB UR QL STRIP: NEGATIVE
BUN SERPL-MCNC: 11 MG/DL (ref 7–30)
CALCIUM SERPL-MCNC: 8.8 MG/DL (ref 8.5–10.1)
CHLORIDE SERPL-SCNC: 100 MMOL/L (ref 94–109)
CHOLEST SERPL-MCNC: 163 MG/DL
CO2 SERPL-SCNC: 24 MMOL/L (ref 20–32)
COLOR UR AUTO: YELLOW
CREAT SERPL-MCNC: 0.7 MG/DL (ref 0.52–1.04)
CRP SERPL-MCNC: 25 MG/L (ref 0–8)
DEPRECATED CALCIDIOL+CALCIFEROL SERPL-MC: 36 UG/L (ref 20–75)
ERYTHROCYTE [DISTWIDTH] IN BLOOD BY AUTOMATED COUNT: 12.2 % (ref 10–15)
FSH SERPL-ACNC: 11.6 IU/L
GFR SERPL CREATININE-BSD FRML MDRD: 89 ML/MIN/1.7M2
GLUCOSE SERPL-MCNC: 357 MG/DL (ref 70–99)
GLUCOSE UR STRIP-MCNC: >=1000 MG/DL
HBA1C MFR BLD: 14 % (ref 0–5.6)
HCT VFR BLD AUTO: 37.8 % (ref 35–47)
HDLC SERPL-MCNC: 48 MG/DL
HGB BLD-MCNC: 12.9 G/DL (ref 11.7–15.7)
HGB UR QL STRIP: ABNORMAL
KETONES UR STRIP-MCNC: NEGATIVE MG/DL
LDLC SERPL CALC-MCNC: 88 MG/DL
LEUKOCYTE ESTERASE UR QL STRIP: NEGATIVE
LH SERPL-ACNC: 4.3 IU/L
MCH RBC QN AUTO: 30.4 PG (ref 26.5–33)
MCHC RBC AUTO-ENTMCNC: 34.1 G/DL (ref 31.5–36.5)
MCV RBC AUTO: 89 FL (ref 78–100)
NITRATE UR QL: NEGATIVE
NONHDLC SERPL-MCNC: 115 MG/DL
PH UR STRIP: 5 PH (ref 5–7)
PLATELET # BLD AUTO: 251 10E9/L (ref 150–450)
POTASSIUM SERPL-SCNC: 4.1 MMOL/L (ref 3.4–5.3)
PROT SERPL-MCNC: 8.1 G/DL (ref 6.8–8.8)
RBC # BLD AUTO: 4.25 10E12/L (ref 3.8–5.2)
RBC #/AREA URNS AUTO: NORMAL /HPF
SODIUM SERPL-SCNC: 134 MMOL/L (ref 133–144)
SOURCE: ABNORMAL
SP GR UR STRIP: 1.01 (ref 1–1.03)
TRIGL SERPL-MCNC: 134 MG/DL
TSH SERPL DL<=0.005 MIU/L-ACNC: 2.26 MU/L (ref 0.4–4)
UROBILINOGEN UR STRIP-ACNC: 0.2 EU/DL (ref 0.2–1)
WBC # BLD AUTO: 12 10E9/L (ref 4–11)
WBC #/AREA URNS AUTO: NORMAL /HPF

## 2018-08-31 PROCEDURE — 36415 COLL VENOUS BLD VENIPUNCTURE: CPT | Performed by: PHYSICIAN ASSISTANT

## 2018-08-31 PROCEDURE — 81001 URINALYSIS AUTO W/SCOPE: CPT | Performed by: PHYSICIAN ASSISTANT

## 2018-08-31 PROCEDURE — 84443 ASSAY THYROID STIM HORMONE: CPT | Performed by: PHYSICIAN ASSISTANT

## 2018-08-31 PROCEDURE — 83002 ASSAY OF GONADOTROPIN (LH): CPT | Performed by: PHYSICIAN ASSISTANT

## 2018-08-31 PROCEDURE — 99396 PREV VISIT EST AGE 40-64: CPT | Performed by: PHYSICIAN ASSISTANT

## 2018-08-31 PROCEDURE — 83036 HEMOGLOBIN GLYCOSYLATED A1C: CPT | Performed by: PHYSICIAN ASSISTANT

## 2018-08-31 PROCEDURE — 82306 VITAMIN D 25 HYDROXY: CPT | Performed by: PHYSICIAN ASSISTANT

## 2018-08-31 PROCEDURE — 86140 C-REACTIVE PROTEIN: CPT | Performed by: PHYSICIAN ASSISTANT

## 2018-08-31 PROCEDURE — 83001 ASSAY OF GONADOTROPIN (FSH): CPT | Performed by: PHYSICIAN ASSISTANT

## 2018-08-31 PROCEDURE — 99214 OFFICE O/P EST MOD 30 MIN: CPT | Mod: 25 | Performed by: PHYSICIAN ASSISTANT

## 2018-08-31 PROCEDURE — 82043 UR ALBUMIN QUANTITATIVE: CPT | Performed by: PHYSICIAN ASSISTANT

## 2018-08-31 PROCEDURE — 80053 COMPREHEN METABOLIC PANEL: CPT | Performed by: PHYSICIAN ASSISTANT

## 2018-08-31 PROCEDURE — 80061 LIPID PANEL: CPT | Performed by: PHYSICIAN ASSISTANT

## 2018-08-31 PROCEDURE — 85027 COMPLETE CBC AUTOMATED: CPT | Performed by: PHYSICIAN ASSISTANT

## 2018-08-31 RX ORDER — HYDROCODONE BITARTRATE AND ACETAMINOPHEN 5; 325 MG/1; MG/1
1 TABLET ORAL EVERY 6 HOURS PRN
Qty: 30 TABLET | Refills: 0 | Status: SHIPPED | OUTPATIENT
Start: 2018-08-31 | End: 2018-12-14

## 2018-08-31 RX ORDER — HYDROCODONE BITARTRATE AND ACETAMINOPHEN 5; 325 MG/1; MG/1
1 TABLET ORAL EVERY 6 HOURS PRN
Qty: 30 TABLET | Refills: 0 | Status: CANCELLED | OUTPATIENT
Start: 2018-08-31

## 2018-08-31 RX ORDER — CYCLOBENZAPRINE HCL 10 MG
10 TABLET ORAL 3 TIMES DAILY PRN
Qty: 30 TABLET | Refills: 5 | Status: SHIPPED | OUTPATIENT
Start: 2018-08-31 | End: 2021-09-01

## 2018-08-31 RX ORDER — LOSARTAN POTASSIUM 100 MG/1
100 TABLET ORAL DAILY
Qty: 90 TABLET | Refills: 3 | Status: SHIPPED | OUTPATIENT
Start: 2018-08-31 | End: 2019-08-15

## 2018-08-31 RX ORDER — ALBUTEROL SULFATE 90 UG/1
2 AEROSOL, METERED RESPIRATORY (INHALATION) EVERY 6 HOURS PRN
Qty: 1 INHALER | Refills: 4 | Status: SHIPPED | OUTPATIENT
Start: 2018-08-31 | End: 2020-09-11

## 2018-08-31 RX ORDER — FLUCONAZOLE 150 MG/1
150 TABLET ORAL ONCE
Qty: 1 TABLET | Refills: 0 | Status: SHIPPED | OUTPATIENT
Start: 2018-08-31 | End: 2018-08-31
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
## 2018-08-31 NOTE — MR AVS SNAPSHOT
After Visit Summary   8/31/2018    Brenda Renee    MRN: [id number]           Patient Information     Date Of Birth          1970        Visit Information        Provider Department      8/31/2018 7:50 AM Chasidy Bajwa PA-C [facility]        Today's Diagnoses     Routine general medical examination at a health care facility    -  1    Essential hypertension with goal blood pressure less than 140/90        Rectal tear (H)        Morbid obesity due to excess calories (H)        Abnormal fasting glucose        Chronic pain syndrome        Hematuria, unspecified type        Vitamin D deficiency        Acute bronchospasm        Neck pain        Myofascial pain        Plantar fascial fibromatosis        Menopausal syndrome (hot flashes)        Yeast infection of the vagina        Cramp of limb        Atypical chest pain        Snoring          Care Instructions      Preventive Health Recommendations  Female Ages 40 to 49    Yearly exam:     See your health care provider every year in order to  1. Review health changes.   2. Discuss preventive care.    3. Review your medicines if your doctor prescribed any.      Get a Pap test every three years (unless you have an abnormal result and your provider advises testing more often).      If you get Pap tests with HPV test, you only need to test every 5 years, unless you have an abnormal result. You do not need a Pap test if your uterus was removed (hysterectomy) and you have not had cancer.      You should be tested each year for STDs (sexually transmitted diseases), if you're at risk.     Ask your doctor if you should have a mammogram.      Have a colonoscopy (test for colon cancer) if someone in your family has had colon cancer or polyps before age 50.       Have a cholesterol test every 5 years.       Have a diabetes test (fasting glucose) after age 45. If you are at risk for diabetes, you should have  this test every 3 years.    Shots: Get a flu shot each year. Get a tetanus shot every 10 years.     Nutrition:     Eat at least 5 servings of fruits and vegetables each day.    Eat whole-grain bread, whole-wheat pasta and brown rice instead of white grains and rice.    Get adequate Calcium and Vitamin D.      Lifestyle    Exercise at least 150 minutes a week (an average of 30 minutes a day, 5 days a week). This will help you control your weight and prevent disease.    Limit alcohol to one drink per day.    No smoking.     Wear sunscreen to prevent skin cancer.    See your dentist every six months for an exam and cleaning.          Follow-ups after your visit        Additional Services     CARDIOLOGY EVAL ADULT REFERRAL       Preferred location:  [facility] [phone]   [email or web address]    Please be aware that coverage of these services is subject to the terms and limitations of your health insurance plan.  Call member services at your health plan with any benefit or coverage questions.      Please bring the following to your appointment:  Any x-rays, CTs or MRIs which have been performed. Contact the facility where they were done to arrange for  prior to your scheduled appointment.    List of current medications  This referral request   Any documents/labs given to you for this referral            GASTROENTEROLOGY ADULT REF CONSULT ONLY       REFERRAL ONLY -  N: [facility] [phone]  Fax [phone] (Dr. Acevedo, Dr. Morillo, Dr. Simms & Dr. Roa - Colonoscopies)    Please be aware that coverage of these services is subject to the terms and limitations of your health insurance plan.  Call member services at your health plan with any benefit or coverage questions.  Any procedures must be performed at a [place] facility OR coordinated by your clinic's referral office.    Please bring the following with you  to your appointment:    (1) Any X-Rays, CTs or MRIs which have been performed.  Contact the facility where they were done to arrange for  prior to your scheduled appointment.    (2) List of current medications   (3) This referral request   (4) Any documents/labs given to you for this referral            SKIN CARE REFERRAL       Your provider has referred you to: FMG: [facility] [place] [phone]   FMG: [facility] [place] [phone]  [email or web address]     Please be aware that coverage of these services is subject to the terms and limitations of your health insurance plan.  Please check with your insurance prior to the appointment to ensure appropriate coverage for any services considered cosmetic in nature or not medically necessary.    Please bring the following with you to your appointment:    (1) Any X-Rays, CTs or MRIs which have been performed.  Contact the facility where they were done to arrange for  prior to your scheduled appointment.  Any new CT, MRI or other procedures ordered by your specialist must be performed at a [facility] facility or coordinated by your clinic's referral office.  (2) List of current medications  (3) This referral request   (4) Any documents/labs given to you for this referral            SLEEP EVALUATION & MANAGEMENT REFERRAL - ADULT -[facility] [phone]  (Age 18 and up)       Please be aware that coverage of these services is subject to the terms and limitations of your health insurance plan.  Call member services at your health plan with any benefit or coverage questions.      Please bring the following to your appointment:    >>   List of current medications   >>   This referral request   >>   Any documents/labs given to you for this referral                      Your next 10 appointments already scheduled     Sep 14, 2018 10:30 AM CDT   Office Visit  "with Chasidy Bajwa PA-C   [facility]    [address] [id number] [phone]           Bring a current list of meds and any records pertaining to this visit. For Physicals, please bring immunization records and any forms needing to be filled out. Please arrive 10 minutes early to complete paperwork.              Future tests that were ordered for you today     Open Future Orders        Priority Expected Expires Ordered    SLEEP EVALUATION & MANAGEMENT REFERRAL - ADULT -[facility] [phone]  (Age 18 and up) Routine  8/31/2019 8/31/2018            Who to contact     If you have questions or need follow up information about today's clinic visit or your schedule please contact [facility] directly at [phone].  Normal or non-critical lab and imaging results will be communicated to you by MyChart, letter or phone within 4 business days after the clinic has received the results. If you do not hear from us within 7 days, please contact the clinic through MyChart or phone. If you have a critical or abnormal lab result, we will notify you by phone as soon as possible.  Submit refill requests through [employer] [place] or call your pharmacy and they will forward the refill request to us. Please allow 3 business days for your refill to be completed.          Additional Information About Your Visit        Care EveryWhere ID     This is your Care EveryWhere ID. This could be used by other organizations to access your [place] medical records  [id number]        Your Vitals Were     Pulse Temperature Respirations Height Last Period Pulse Oximetry    82 97.9  F (36.6  C) (Tympanic) 14 5' 4.25\" (1.632 m) 08/31/2018 (Exact Date) 97%    Breastfeeding? BMI (Body Mass Index)                No 39.17 kg/m2           Blood Pressure from Last 3 Encounters: "   08/31/18 138/74   07/05/17 124/70   05/17/17 136/80    Weight from Last 3 Encounters:   08/31/18 230 lb (104.3 kg)   07/05/17 253 lb (114.8 kg)   05/17/17 250 lb (113.4 kg)              We Performed the Following     Albumin Random Urine Quantitative with Creat Ratio     CARDIOLOGY EVAL ADULT REFERRAL     CBC with platelets     Comprehensive metabolic panel     CRP inflammation     Follicle stimulating hormone     GASTROENTEROLOGY ADULT REF CONSULT ONLY     Hemoglobin A1c     Lipid panel reflex to direct LDL Non-fasting     Lutropin     SKIN CARE REFERRAL     TSH with free T4 reflex     UA reflex to Microscopic and Culture     Vitamin D Deficiency          Today's Medication Changes          These changes are accurate as of 8/31/18 10:10 AM.  If you have any questions, ask your nurse or doctor.               Start taking these medicines.        Dose/Directions    fluconazole 150 MG tablet   Commonly known as:  DIFLUCAN   Used for:  Yeast infection of the vagina   Started by:  Chasidy Bajwa PA-C        Dose:  150 mg   Take 1 tablet (150 mg) by mouth once for 1 dose   Quantity:  1 tablet   Refills:  0            Where to get your medicines      These medications were sent to [facility] [id number] [place] [address] [address]     Phone:  [phone]     albuterol 108 (90 Base) MCG/ACT inhaler    cyclobenzaprine 10 MG tablet    fluconazole 150 MG tablet         Some of these will need a paper prescription and others can be bought over the counter.  Ask your nurse if you have questions.     Bring a paper prescription for each of these medications     HYDROcodone-acetaminophen 5-325 MG per tablet    losartan 100 MG tablet               Information about OPIOIDS     PRESCRIPTION OPIOIDS: WHAT YOU NEED TO KNOW   We gave you an opioid (narcotic) pain medicine. It is important to manage your pain, but opioids are not always the best choice. You should first try  all the other options your care team gave you. Take this medicine for as short a time (and as few doses) as possible.    Some activities can increase your pain, such as bandage changes or therapy sessions. It may help to take your pain medicine 30 to 60 minutes before these activities. Reduce your stress by getting enough sleep, working on hobbies you enjoy and practicing relaxation or meditation. Talk to your care team about ways to manage your pain beyond prescription opioids.    These medicines have risks:    DO NOT drive when on new or higher doses of pain medicine. These medicines can affect your alertness and reaction times, and you could be arrested for driving under the influence (DUI). If you need to use opioids long-term, talk to your care team about driving.    DO NOT operate heavy machinery    DO NOT do any other dangerous activities while taking these medicines.    DO NOT drink any alcohol while taking these medicines.     If the opioid prescribed includes acetaminophen, DO NOT take with any other medicines that contain acetaminophen. Read all labels carefully. Look for the word  acetaminophen  or  Tylenol.  Ask your pharmacist if you have questions or are unsure.    You can get addicted to pain medicines, especially if you have a history of addiction (chemical, alcohol or substance dependence). Talk to your care team about ways to reduce this risk.    All opioids tend to cause constipation. Drink plenty of water and eat foods that have a lot of fiber, such as fruits, vegetables, prune juice, apple juice and high-fiber cereal. Take a laxative (Miralax, milk of magnesia, Colace, Senna) if you don t move your bowels at least every other day. Other side effects include upset stomach, sleepiness, dizziness, throwing up, tolerance (needing more of the medicine to have the same effect), physical dependence and slowed breathing.    Store your pills in a secure place, locked if possible. We will not replace any  lost or stolen medicine. If you don t finish your medicine, please throw away (dispose) as directed by your pharmacist. The [place] Pollution Control Agency has more information about safe disposal: [email or web address]         Primary Care Provider Office Phone # Fax #    Chasidy Bajwa PA-C [phone]       [address] [facility] [address]  [facility] [address]        Goals        Diet    Reduce portion size        Exercise    Exercise 3x per week (30 min per time)       Equal Access to Services     [facility]: Hadii aad ku hadasho Soomaali, waaxda luqadaha, qaybta kaalmada adeegyada, sammie duncan hayileana adedee dee kaba . So [facility] [phone].    ATENCIÓN: Si habla español, tiene a siu disposición servicios gratuitos de asistencia lingüística. Llame al [phone].    We comply with applicable federal civil rights laws and [place] laws. We do not discriminate on the basis of race, color, national origin, age, disability, sex, sexual orientation, or gender identity.            Thank you!     Thank you for choosing [facility]  for your care. Our goal is always to provide you with excellent care. Hearing back from our patients is one way we can continue to improve our services. Please take a few minutes to complete the written survey that you may receive in the mail after your visit with us. Thank you!             Your Updated Medication List - Protect others around you: Learn how to safely use, store and throw away your medicines at www.disposemymeds.org.          This list is accurate as of 8/31/18 10:10 AM.  Always use your most recent med list.                   Brand Name Dispense Instructions for use Diagnosis    albuterol 108 (90 Base) MCG/ACT inhaler    PROAIR HFA    1 Inhaler    Inhale 2 puffs into the lungs every 6 hours as needed    Acute bronchospasm       ALLEGRA 180 MG tablet   Generic drug:   fexofenadine      Take 1 tablet by mouth daily.    Somatic dysfunction of lower extremities, Somatic dysfunction of sacral region, Somatic dysfunction of pelvic region, Somatic dysfunction of lumbar region, Somatic dysfunction of thoracic region, Segmental and somatic dysfunction of rib cage, Somatic dysfunction of upper extremities, Somatic dysfunction of cervical region, Somatic dysfunction of spine affecting head region       cyclobenzaprine 10 MG tablet    FLEXERIL    30 tablet    Take 1 tablet (10 mg) by mouth 3 times daily as needed    Neck pain, Myofascial pain, Plantar fascial fibromatosis       fluconazole 150 MG tablet    DIFLUCAN    1 tablet    Take 1 tablet (150 mg) by mouth once for 1 dose    Yeast infection of the vagina       HYDROcodone-acetaminophen 5-325 MG per tablet    NORCO    30 tablet    Take 1 tablet by mouth every 6 hours as needed for moderate to severe pain    Neck pain, Myofascial pain       ibuprofen 200 MG capsule      Take 200 mg by mouth as needed        LIDODERM 5 % Patch   Generic drug:  lidocaine           losartan 100 MG tablet    COZAAR    90 tablet    Take 1 tablet (100 mg) by mouth daily    Essential hypertension with goal blood pressure less than 140/90       MAGNESIUM GLYCINATE PLUS PO      Take 200 mg by mouth 2 times daily        MULTI-VITAMIN DAILY Tabs      Take  by mouth.        vitamin D 2000 units Caps      Take 2-3 capsules by mouth daily.

## 2018-08-31 NOTE — Clinical Note
Please abstract the following data from this visit with this patient into the appropriate field in Epic:  Pap smear done on this date: Aug 2018 (approximately), by this group: . Dr. Torres.[facility], results were NIL, HPV negative..

## 2018-08-31 NOTE — LETTER
September 7, 2018      Brenda Renee  [address] [place] [address]        Dear ,    We are writing to inform you of your test results.    Blood sugar is much much worse from last year.  I have put in a diabetic education referral for you and it's very important you attend the class:    Your provider has referred you to Diabetes Education: FMG: Diabetes Education - All [facility] [phone]   [email or web address]     I am starting you on a new medication called metformin which I want you to take 1 pill twice daily with meals for 1 week then increase to 2 pills twice with meals. We need to have a follow up medication check in 1 month.    You are not menopausal.  Your thyroid and hemoglobin are normal.  Your inflammatory marker is high because of your high blood sugar.    Resulted Orders   Lipid panel reflex to direct LDL Non-fasting   Result Value Ref Range    Cholesterol 163 <200 mg/dL    Triglycerides 134 <150 mg/dL      Comment:      Fasting specimen    HDL Cholesterol 48 (L) >49 mg/dL    LDL Cholesterol Calculated 88 <100 mg/dL      Comment:      Desirable:       <100 mg/dl    Non HDL Cholesterol 115 <130 mg/dL   Comprehensive metabolic panel   Result Value Ref Range    Sodium 134 133 - 144 mmol/L    Potassium 4.1 3.4 - 5.3 mmol/L    Chloride 100 94 - 109 mmol/L    Carbon Dioxide 24 20 - 32 mmol/L    Anion Gap 10 3 - 14 mmol/L    Glucose 357 (H) 70 - 99 mg/dL      Comment:      Fasting specimen    Urea Nitrogen 11 7 - 30 mg/dL    Creatinine 0.70 0.52 - 1.04 mg/dL    GFR Estimate 89 >60 mL/min/1.7m2      Comment:      Non  GFR Calc    GFR Estimate If Black >90 >60 mL/min/1.7m2      Comment:       GFR Calc    Calcium 8.8 8.5 - 10.1 mg/dL    Bilirubin Total 0.5 0.2 - 1.3 mg/dL    Albumin 3.7 3.4 - 5.0 g/dL    Protein Total 8.1 6.8 - 8.8 g/dL    Alkaline Phosphatase 125 40 - 150 U/L     (H) 0 -  50 U/L    AST 92 (H) 0 - 45 U/L   UA reflex to Microscopic and Culture   Result Value Ref Range    Color Urine Yellow     Appearance Urine Clear     Glucose Urine >=1000 (A) NEG^Negative mg/dL    Bilirubin Urine Negative NEG^Negative    Ketones Urine Negative NEG^Negative mg/dL    Specific Gravity Urine 1.010 1.003 - 1.035    Blood Urine Large (A) NEG^Negative    pH Urine 5.0 5.0 - 7.0 pH    Protein Albumin Urine Negative NEG^Negative mg/dL    Urobilinogen Urine 0.2 0.2 - 1.0 EU/dL    Nitrite Urine Negative NEG^Negative    Leukocyte Esterase Urine Negative NEG^Negative    Source Midstream Urine    Hemoglobin A1c   Result Value Ref Range    Hemoglobin A1C 14.0 (H) 0 - 5.6 %      Comment:      Normal <5.7% Prediabetes 5.7-6.4%  Diabetes 6.5% or higher - adopted from ADA   consensus guidelines.  Results confirmed by repeat test     CBC with platelets   Result Value Ref Range    WBC 12.0 (H) 4.0 - 11.0 10e9/L    RBC Count 4.25 3.8 - 5.2 10e12/L    Hemoglobin 12.9 11.7 - 15.7 g/dL    Hematocrit 37.8 35.0 - 47.0 %    MCV 89 78 - 100 fl    MCH 30.4 26.5 - 33.0 pg    MCHC 34.1 31.5 - 36.5 g/dL    RDW 12.2 10.0 - 15.0 %    Platelet Count 251 150 - 450 10e9/L   Vitamin D Deficiency   Result Value Ref Range    Vitamin D Deficiency screening 36 20 - 75 ug/L   TSH with free T4 reflex   Result Value Ref Range    TSH 2.26 0.40 - 4.00 mU/L   Albumin Random Urine Quantitative with Creat Ratio   Result Value Ref Range    Creatinine Urine 79 mg/dL    Albumin Urine mg/L 17 mg/L    Albumin Urine mg/g Cr 21.09 0 - 25 mg/g Cr   CRP inflammation   Result Value Ref Range    CRP Inflammation 25.0 (H) 0.0 - 8.0 mg/L   Follicle stimulating hormone   Result Value Ref Range    FSH 11.6 IU/L      FSH Reference Range  Female: Follicular      2.5-10.2          Mid-cycle       3.4-33.4          Luteal          1.5-9.1          Postmenopausal  23.0-116.3   Lutropin   Result Value Ref Range    Lutropin 4.3 IU/L      LH Reference Range  Female:  Follicular      1.9-12.5          Mid-cycle       8.7-76.3          Luteal          0.5-16.9          Postmenopausal  15.9-54.0   Urine Microscopic   Result Value Ref Range    WBC Urine 0 - 5 OTO5^0 - 5 /HPF    RBC Urine O - 2 OTO2^O - 2 /HPF       If you have any questions or concerns, please call the clinic at the number listed above.       Sincerely,        Chasidy Bajwa PA-C

## 2018-09-02 LAB
CREAT UR-MCNC: 79 MG/DL
MICROALBUMIN UR-MCNC: 17 MG/L
MICROALBUMIN/CREAT UR: 21.09 MG/G CR (ref 0–25)

## 2018-09-04 ENCOUNTER — TELEPHONE (OUTPATIENT)
Dept: FAMILY MEDICINE | Facility: CLINIC | Age: 48
End: 2018-09-04

## 2018-09-04 PROBLEM — E11.65 TYPE 2 DIABETES MELLITUS WITH HYPERGLYCEMIA, WITHOUT LONG-TERM CURRENT USE OF INSULIN (H): Status: ACTIVE | Noted: 2018-09-04

## 2018-09-04 RX ORDER — METFORMIN HCL 500 MG
1000 TABLET, EXTENDED RELEASE 24 HR ORAL 2 TIMES DAILY WITH MEALS
Qty: 180 TABLET | Refills: 3 | Status: SHIPPED | OUTPATIENT
Start: 2018-09-04 | End: 2019-03-09

## 2018-09-04 NOTE — TELEPHONE ENCOUNTER
Please call pt and inform of the following:     Blood sugar is much much worse from last year.  She is diabetic.  I have put in a diabetic education referral for her and she needs to attend the class:       Your provider has referred you to Diabetes Education: FMG: Diabetes Education - All [facility] [phone]   [email or web address]     I am starting her on a new medication called metformin which I want her to take 1 pill bid with meals for 1 week then increase to 2 pills bid with meals.  She needs to follow up with me in 1 month.     Message left on  to call triage back.

## 2018-09-06 NOTE — TELEPHONE ENCOUNTER
Pt was given lab results and referral information. Pt is requesting providers recommendations for eye exam.  Would provider be ok if  pt see [facility] optometrist or would recommend she get eye check somewhere else. Referral pended. Please complete if appropriate. Ok to leave a detailed message with providers response.

## 2018-09-07 NOTE — PROGRESS NOTES
Lab letter printed and signed.  Message comments below:    Blood sugar is much much worse from last year.  I have put in a diabetic education referral for you and it's very important you attend the class:    Your provider has referred you to Diabetes Education: FMG: Diabetes Education - All [facility] [phone]   [email or web address]     I am starting you on a new medication called metformin which I want you to take 1 pill twice daily with meals for 1 week then increase to 2 pills twice with meals. We need to have a follow up medication check in 1 month.    You are not menopausal.  Your thyroid and hemoglobin are normal.  Your inflammatory marker is high because of your high blood sugar.

## 2018-09-12 ENCOUNTER — NURSE TRIAGE (OUTPATIENT)
Dept: NURSING | Facility: CLINIC | Age: 48
End: 2018-09-12

## 2018-09-12 NOTE — TELEPHONE ENCOUNTER
Additional Information    Negative: [1] Caller is not with the adult (patient) AND [2] reporting urgent symptoms    Negative: Lab result questions    Negative: Medication questions    Negative: Caller cannot be reached by phone    Negative: Caller has already spoken to PCP or another triager    Negative: RN needs further essential information from caller in order to complete triage    Negative: Requesting regular office appointment    Negative: [1] Caller requesting NON-URGENT health information AND [2] PCP's office is the best resource    Negative: Health Information question, no triage required and triager able to answer question    Negative: General information question, no triage required and triager able to answer question    Negative: Question about upcoming scheduled test, no triage required and triager able to answer question    Negative: [1] Caller is not with the adult (patient) AND [2] probable NON-URGENT symptoms    [1] Follow-up call to recent contact AND [2] information only call, no triage required    Protocols used: INFORMATION ONLY CALL-ADULT-DANISH    Brenda calls and says that on 8/31/18, she was started on Metformin and was given a phone # to call, for Diabetes Education. Pt. Says that she lost that phone # and wants that #. RN then checked EPIC and told pt. What the Diabetes Education phone # is. Pt. Voiced understanding and had no further questions.

## 2018-09-13 ENCOUNTER — TRANSFERRED RECORDS (OUTPATIENT)
Dept: HEALTH INFORMATION MANAGEMENT | Facility: CLINIC | Age: 48
End: 2018-09-13

## 2018-09-24 ENCOUNTER — ALLIED HEALTH/NURSE VISIT (OUTPATIENT)
Dept: EDUCATION SERVICES | Facility: CLINIC | Age: 48
End: 2018-09-24
Attending: NUTRITIONIST
Payer: COMMERCIAL

## 2018-09-24 DIAGNOSIS — E11.65 TYPE 2 DIABETES MELLITUS WITH HYPERGLYCEMIA, WITHOUT LONG-TERM CURRENT USE OF INSULIN (H): Primary | ICD-10-CM

## 2018-09-24 PROCEDURE — G0108 DIAB MANAGE TRN  PER INDIV: HCPCS | Performed by: NUTRITIONIST

## 2018-09-24 NOTE — MR AVS SNAPSHOT
After Visit Summary   9/24/2018    Brenda Renee    MRN: [id number]           Patient Information     Date Of Birth          1970        Visit Information        Provider Department      9/24/2018 12:30 PM Mikayla Peterson RD [facility]  [facility]        Today's Diagnoses     Type 2 diabetes mellitus with hyperglycemia, without long-term current use of insulin (H)    -  1       Follow-ups after your visit        Additional Services     ENDOCRINOLOGY ADULT REFERRAL       Your provider has referred you to: [facility]: Endocrinology and Diabetes Clinic [facility] [phone]   [email or web address]      Please be aware that coverage of these services is subject to the terms and limitations of your health insurance plan.  Call member services at your health plan with any benefit or coverage questions.      Please bring the following to your appointment:    >>   Any x-rays, CTs or MRIs which have been performed.  Contact the facility where they were done to arrange for  prior to your scheduled appointment.    >>   List of current medications   >>   This referral request   >>   Any documents/labs given to you for this referral                  Your next 10 appointments already scheduled     Oct 03, 2018  9:30 AM CDT   Diabetes Education with Mikayla Peterson RD   [facility]  [facility] ([facility])    [address] [place] [id number] [phone]              Who to contact     If you have questions or need follow up information about today's clinic visit or your schedule please contact [facility]  DIABETES EDUCATION directly at [phone].  Normal or non-critical lab and imaging results will be communicated to you by MyChart, letter or phone within 4 business days after the clinic has received the results. If you do not hear from us  within 7 days, please contact the clinic through "[employer] or phone. If you have a critical or abnormal lab result, we will notify you by phone as soon as possible.  Submit refill requests through "[employer] or call your pharmacy and they will forward the refill request to us. Please allow 3 business days for your refill to be completed.          Additional Information About Your Visit        Care EveryWhere ID     This is your Care EveryWhere ID. This could be used by other organizations to access your [place] medical records  [id number]        Your Vitals Were     Last Period                   08/31/2018 (Exact Date)            Blood Pressure from Last 3 Encounters:   08/31/18 138/74   07/05/17 124/70   05/17/17 136/80    Weight from Last 3 Encounters:   08/31/18 104.3 kg (230 lb)   07/05/17 114.8 kg (253 lb)   05/17/17 113.4 kg (250 lb)              We Performed the Following     DIABETES EDUCATION - Individual  []     ENDOCRINOLOGY ADULT REFERRAL          Today's Medication Changes          These changes are accurate as of 9/24/18  3:55 PM.  If you have any questions, ask your nurse or doctor.               Start taking these medicines.        Dose/Directions    blood glucose monitoring lancets   Used for:  Type 2 diabetes mellitus with hyperglycemia, without long-term current use of insulin (H)        Use to test blood sugar 2 times daily or as directed.  Ok to substitute alternative if insurance prefers.   Quantity:  100 each   Refills:  11       blood glucose monitoring test strip   Commonly known as:  ONETOUCH VERIO IQ   Used for:  Type 2 diabetes mellitus with hyperglycemia, without long-term current use of insulin (H)        Use to test blood sugar 2 times daily or as directed.  Ok to substitute alternative if insurance prefers.   Quantity:  100 strip   Refills:  11            Where to get your medicines      These medications [facility] [id number] [place] [address] [place] [address]  [facility] [address]     Phone:  [phone]     blood glucose monitoring lancets    blood glucose monitoring test strip                Primary Care Provider Office Phone # Fax #    Chasidy Bajwa PA-C [phone]       [address] [facility] [address]  [facility] [address]        Goals        Diet    Reduce portion size        Exercise    Exercise 3x per week (30 min per time)        General    Healthy Eating (pt-stated)     Notes - Note created  9/24/2018  3:55 PM by Mikayla Peterson, KIRSTIN    Continue to work on cutting intake of sweet drinks way back.  Keep a food journal for at least four days.  Continue to look at labels for carbohydrate, fiber, serving size.       Monitoring (pt-stated)     Notes - Note created  9/24/2018  3:54 PM by Mikayla Peterson, KIRSTIN    Test glucose twice daily.  Fasting and two hours after a meal.         Equal Access to Services     [facility]: Hadii aad ku hadasho Soomaali, waaxda luqadaha, qaybta kaalmada adeegyada, waxay frannyin hayprestonn attila ortegaarahair kaba . So [facility] [phone].    ATENCIÓN: Si habla español, tiene a siu disposición servicios gratuitos de asistencia lingüística. Llame al [phone].    We comply with applicable federal civil rights laws and [place] laws. We do not discriminate on the basis of race, color, national origin, age, disability, sex, sexual orientation, or gender identity.            Thank you!     Thank you for choosing [facility]  DIABETES EDUCATION  for your care. Our goal is always to provide you with excellent care. Hearing back from our patients is one way we can continue to improve our services. Please take a few minutes to complete the written survey that you may receive in the mail after your visit with us. Thank you!             Your Updated Medication List - Protect others around you: Learn how to safely use, store and throw away your medicines at www.disposemymeds.org.          This list is accurate as of  9/24/18  3:55 PM.  Always use your most recent med list.                   Brand Name Dispense Instructions for use Diagnosis    albuterol 108 (90 Base) MCG/ACT inhaler    PROAIR HFA    1 Inhaler    Inhale 2 puffs into the lungs every 6 hours as needed    Acute bronchospasm       ALLEGRA 180 MG tablet   Generic drug:  fexofenadine      Take 1 tablet by mouth daily.    Somatic dysfunction of lower extremities, Somatic dysfunction of sacral region, Somatic dysfunction of pelvic region, Somatic dysfunction of lumbar region, Somatic dysfunction of thoracic region, Segmental and somatic dysfunction of rib cage, Somatic dysfunction of upper extremities, Somatic dysfunction of cervical region, Somatic dysfunction of spine affecting head region       blood glucose monitoring lancets     100 each    Use to test blood sugar 2 times daily or as directed.  Ok to substitute alternative if insurance prefers.    Type 2 diabetes mellitus with hyperglycemia, without long-term current use of insulin (H)       blood glucose monitoring test strip    ONETOUCH VERIO IQ    100 strip    Use to test blood sugar 2 times daily or as directed.  Ok to substitute alternative if insurance prefers.    Type 2 diabetes mellitus with hyperglycemia, without long-term current use of insulin (H)       cyclobenzaprine 10 MG tablet    FLEXERIL    30 tablet    Take 1 tablet (10 mg) by mouth 3 times daily as needed    Neck pain, Myofascial pain, Plantar fascial fibromatosis       HYDROcodone-acetaminophen 5-325 MG per tablet    NORCO    30 tablet    Take 1 tablet by mouth every 6 hours as needed for moderate to severe pain    Neck pain, Myofascial pain       ibuprofen 200 MG capsule      Take 200 mg by mouth as needed        LIDODERM 5 % Patch   Generic drug:  lidocaine           losartan 100 MG tablet    COZAAR    90 tablet    Take 1 tablet (100 mg) by mouth daily    Essential hypertension with goal blood pressure less than 140/90       MAGNESIUM GLYCINATE  PLUS PO      Take 200 mg by mouth 2 times daily        metFORMIN 500 MG 24 hr tablet    GLUCOPHAGE-XR    180 tablet    Take 2 tablets (1,000 mg) by mouth 2 times daily (with meals)    Type 2 diabetes mellitus with hyperglycemia, without long-term current use of insulin (H)       MULTI-VITAMIN DAILY Tabs      Take  by mouth.        vitamin D 2000 units Caps      Take 2-3 capsules by mouth daily.

## 2018-09-24 NOTE — PROGRESS NOTES
"Diabetes Self-Management Education & Support    Diabetes Education Self Management & Training    SUBJECTIVE/OBJECTIVE:  Presents for: Initial Assessment for new diagnosis  Accompanied by: Self  Diabetes education in the past 24mo: No  Focus of Visit: Healthy Eating, Patient Unsure, Monitoring  Diabetes type: Type 2  Disease course: Worsening  Other concerns:: None  Cultural Influences/Ethnic Background:  American    Diabetes Symptoms & Complications  Blurred vision: Yes  Polydipsia: Yes  Polyuria: Yes  Weight loss: Yes  Symptom course: Improving     Patient Problem List and Family Medical History reviewed for relevant medical history, current medical status, and diabetes risk factors.    Vitals:  LMP 08/31/2018 (Exact Date)  Estimated body mass index is 39.17 kg/(m^2) as calculated from the following:    Height as of 8/31/18: 1.632 m (5' 4.25\").    Weight as of 8/31/18: 104.3 kg (230 lb).   Last 3 BP:   BP Readings from Last 3 Encounters:   08/31/18 138/74   07/05/17 124/70   05/17/17 136/80       History   Smoking Status     Never Smoker   Smokeless Tobacco     Never Used       Labs:  Lab Results   Component Value Date    A1C 14.0 08/31/2018     Lab Results   Component Value Date     08/31/2018     Lab Results   Component Value Date    LDL 88 08/31/2018     HDL Cholesterol   Date Value Ref Range Status   08/31/2018 48 (L) >49 mg/dL Final   ]  GFR Estimate   Date Value Ref Range Status   08/31/2018 89 >60 mL/min/1.7m2 Final     Comment:     Non  GFR Calc     GFR Estimate If Black   Date Value Ref Range Status   08/31/2018 >90 >60 mL/min/1.7m2 Final     Comment:      GFR Calc     Lab Results   Component Value Date    CR 0.70 08/31/2018     No results found for: MICROALBUMIN    Healthy Eating  Healthy Eating Assessed Today: Yes  Cultural/Gnosticist diet restrictions?: No  Has patient met with a dietitian in the past?: No  Patient was diagnosed with type 2 diabetes last summer. Her " A1C at that time was 7. Patient was supposed to return to her PCP at that time but did not. She did not start diabetes education. Patient reports the loss of a family member as well as other stressors that prevented her from following up on her own care.  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 Iced tea, lemonade, soda.  She followed up with her doctor this month with c/o thirst, blurry vision, frequent urination. States she was also feeling leg cramps and foot pain.   Since learning of the rise in her a1c she has cut back on the sweet drinks but not eliminated them completely.  She was drinking 16 ounces whole milk with CIB for breakfast but has started to try to have toast instead. We were able to discuss her lunch, dinner, snacks in detail today.     Being Active  Being Active Assessed Today: Yes  Exercise:: Yes  States limited due to back injury. She takes the stairs. Has been moving boxes at her aunts. Takes the dogs on a short walk twice per day.     Monitoring  Monitoring Assessed Today: Yes  Blood Glucose Meter: One Touch    Blood glucose meter was taught today.    Taking Medications  Diabetes Medication(s)     Biguanides Sig    metFORMIN (GLUCOPHAGE-XR) 500 MG 24 hr tablet Take 2 tablets (1,000 mg) by mouth 2 times daily (with meals)          Taking Medication Assessed Today: Yes  Current Treatments: Oral Agent (monotherapy)  Problems taking diabetes medications regularly?: No  Diabetes medication side effects?: No  Treatment Compliance: All of the time     Healthy Coping  Healthy Coping Assessed Today: Yes  Emotional response to diabetes: Ready to learn  Patient Activation Measure Survey Score:  RUPERTO Score (Last Two) 8/31/2016   RUPERTO Raw Score 32   Activation Score 62.6   RUPERTO Level 3       ASSESSMENT:  Patient with newly diagnosed type 2 diabetes.   Needs full education.  Patient is requesting referral for an Endocrinologist.     Goals        Diet     Reduce portion size        Exercise    Exercise 3x per week (30 min per time)           Patient's most recent   Lab Results   Component Value Date    A1C 14.0 08/31/2018    is not meeting goal of <7.0    INTERVENTION:   Diabetes knowledge and skills assessment:     Patient needs further education on the following diabetes management concepts: Healthy Eating, Being Active, Monitoring, Taking Medication, Problem Solving, Reducing Risks and Healthy Coping    Based on learning assessment above, most appropriate setting for further diabetes education would be: Individual setting.    Education provided today on:  AADE Self-Care Behaviors:  Diabetes Pathophysiology  Healthy Eating: consistency in amount, composition, and timing of food intake, what to look for on the food label, reducing sugar intake and general healthy eating. Nutritious versus empty carbs. Benefits of fiber.   Being Active: relationship to blood glucose  Monitoring: purpose, proper technique, log and interpret results, individual blood glucose targets, frequency of monitoring and proper sharps disposal  Taking Medication: action of prescribed medication  Reducing Risks: A1C - goals, relating to blood glucose levels, how often to check  Healthy Coping: benefits of making appropriate lifestyle changes  Patient was instructed on One Touch Verio Flex meter and was able to provide an accurate return demonstration. Patient's blood glucose reading today was 217 mg/dL.    Opportunities for ongoing education and support in diabetes-self management were discussed.    Pt verbalized understanding of concepts discussed and recommendations provided today.       Education Materials Provided:  [facility] Understanding Diabetes Booklet, Safe Disposal Options for Needles & Syringes and Carbohydrate Counting    PLAN:  See Patient Instructions for co-developed, patient-stated behavior change goals.  AVS printed and provided to patient today. See Follow-Up section for  recommended follow-up.  Goals:   Test glucose twice daily. Fasting and two hours after one meal daily.  Continue to work on cutting intake of sweet drinks way back.  Keep a food journal for at least four days.  Continue to look at labels for carbohydrate, fiber, serving size.   Time Spent: 120 minutes  Encounter Type: Individual    Any diabetes medication dose changes were made via the CDE Protocol and Collaborative Practice Agreement with the patient's referring provider. A copy of this encounter was shared with the provider.

## 2018-10-03 ENCOUNTER — ALLIED HEALTH/NURSE VISIT (OUTPATIENT)
Dept: EDUCATION SERVICES | Facility: CLINIC | Age: 48
End: 2018-10-03
Attending: PHYSICIAN ASSISTANT
Payer: COMMERCIAL

## 2018-10-03 ENCOUNTER — TELEPHONE (OUTPATIENT)
Dept: FAMILY MEDICINE | Facility: CLINIC | Age: 48
End: 2018-10-03

## 2018-10-03 VITALS — BODY MASS INDEX: 39.34 KG/M2 | WEIGHT: 231 LBS

## 2018-10-03 DIAGNOSIS — E11.65 TYPE 2 DIABETES MELLITUS WITH HYPERGLYCEMIA, WITHOUT LONG-TERM CURRENT USE OF INSULIN (H): ICD-10-CM

## 2018-10-03 DIAGNOSIS — E11.65 TYPE 2 DIABETES MELLITUS WITH HYPERGLYCEMIA, WITHOUT LONG-TERM CURRENT USE OF INSULIN (H): Primary | ICD-10-CM

## 2018-10-03 PROCEDURE — G0108 DIAB MANAGE TRN  PER INDIV: HCPCS | Performed by: NUTRITIONIST

## 2018-10-03 NOTE — PROGRESS NOTES
"Diabetes Self-Management Education & Support    Diabetes Education Self Management & Training    SUBJECTIVE/OBJECTIVE:  Presents for: Follow-up  Accompanied by: Self  Diabetes education in the past 24mo: Yes  Focus of Visit: Monitoring, Healthy Eating  Diabetes type: Type 2  Disease course: Improving  Other concerns:: None  Cultural Influences/Ethnic Background:  American    Diabetes Symptoms & Complications          Patient Problem List and Family Medical History reviewed for relevant medical history, current medical status, and diabetes risk factors.    Vitals:  Wt 104.8 kg (231 lb)  BMI 39.34 kg/m2  Estimated body mass index is 39.34 kg/(m^2) as calculated from the following:    Height as of 8/31/18: 1.632 m (5' 4.25\").    Weight as of this encounter: 104.8 kg (231 lb).   Last 3 BP:   BP Readings from Last 3 Encounters:   08/31/18 138/74   07/05/17 124/70   05/17/17 136/80       History   Smoking Status     Never Smoker   Smokeless Tobacco     Never Used       Labs:  Lab Results   Component Value Date    A1C 14.0 08/31/2018     Lab Results   Component Value Date     08/31/2018     Lab Results   Component Value Date    LDL 88 08/31/2018     HDL Cholesterol   Date Value Ref Range Status   08/31/2018 48 (L) >49 mg/dL Final   ]  GFR Estimate   Date Value Ref Range Status   08/31/2018 89 >60 mL/min/1.7m2 Final     Comment:     Non  GFR Calc     GFR Estimate If Black   Date Value Ref Range Status   08/31/2018 >90 >60 mL/min/1.7m2 Final     Comment:      GFR Calc     Lab Results   Component Value Date    CR 0.70 08/31/2018     No results found for: MICROALBUMIN    Healthy Eating  Healthy Eating Assessed Today: Yes  Patient on a regular basis: Eats 3 meals a day, Snacks frequently throughout the night  Meal planning: Smaller portions  Meals include: Breakfast, Lunch, Dinner, Snacks  Patient brought food records.   Some challenges she continues to face are: eating out often, " portions, cravings for sweets and sweet drinks  She is trying to make more sensible decisions when eating out. We talked further today about some adjustments to eating out that could improve the nutrition in the meal and reduce the non nutritious carbs. For example she had chinese - hot and sour soup (15g), egg roll (15g), mary jhoana (15g) and fried rice (42g). Next time she would skip the fried rice and egg roll and she would add 1/2 cup of brown rice (22g). We discussed other eating out meals in a similar fashion.  Brenda continues to work on cutting back sweetened drinks. She is using about six ounces and then diluting with a lot of water. The goal is to eventually just be having these drinks on occasion. She feels good about the progress she is making.   We reviewed some of the labels of foods she is eating today and discussed what to look for and how to use them for portions.   From food records, we find that her meals are much more balanced and nutritious when she eats at home.  She reports she doesn't have time for breakfast at home every day but then states that she is still noticing some resistance to making changes.   She is trying to reduce added sugar. We talked about what is important to include in the diet on a daily basis and Brenda states fruits and especially vegetables, which she is getting more of, but still not as much as she'd like to or what I'd like to see her get as well. She does like vegetables. So we discussed some easy ways to incorporate more of the veggies she likes.    Patient stated weight goal is under 200 lbs.     Being Active  Being Active Assessed Today: Yes  She works three days per week on her feet for 5 hours  Is trying to take stairs more   Takes dogs for a short walk twice per day    Monitoring  Monitoring Assessed Today: Yes  Blood Glucose Meter: One Touch  Patient was only able to get 50 strips from the pharmacy and she states that she would like to test more often  initially to get a better idea of what her blood glucose is and how different foods and activity affect her readings. She is asking for a new prescription which I entered today.  She is also interested in potentially getting a [employer] personal sensor. Patient will look into that this week as well as insurance coverage.              Taking Medications  Diabetes Medication(s)     Biguanides Sig    metFORMIN (GLUCOPHAGE-XR) 500 MG 24 hr tablet Take 2 tablets (1,000 mg) by mouth 2 times daily (with meals)        Taking Medication Assessed Today: Yes    Problem Solving  Problem Solving Assessed Today: No    Reducing Risks  Reducing Risks Assessed Today: No    Healthy Coping  Healthy Coping Assessed Today: Yes  Emotional response to diabetes: Ready to learn, Acceptance, Concern for health and well-being  Informal Support system:: Significant other  Stage of change: ACTION (Actively working towards change)  Patient Activation Measure Survey Score:  RUPERTO Score (Last Two) 8/31/2016   RUPERTO Raw Score 32   Activation Score 62.6   RUPERTO Level 3       ASSESSMENT:    Goals Addressed as of 10/3/2018 at 12:09 PM             Today      Exercise 3x per week (30 min per time)   On track    Added 9/23/15 by Chasidy Bajwa PA-C      Healthy Eating (pt-stated)   On track    Added 9/24/18 by Mikayla Peterson RD             Continue to work on cutting intake of sweet drinks way back.  Keep a food journal for at least four days.  Continue to look at labels for carbohydrate, fiber, serving size.         Monitoring (pt-stated)   On track    Added 9/24/18 by Mikayla Peterson RD             Test glucose twice daily.  Fasting and two hours after a meal.         Reduce portion size   On track    Added 9/23/15 by Chasidy Bajwa PA-C          Patient's most recent   Lab Results   Component Value Date    A1C 14.0 08/31/2018    is not meeting goal of <7.0     We did very briefly discuss possible need for a second  medication and options but patient did not want to discuss further at this time.   Her glucose is responding to the metformin and she will continue to work on diet and activity.    INTERVENTION:   Diabetes knowledge and skills assessment:     Patient is knowledgeable in diabetes management concepts related to: Healthy Eating, Being Active, Monitoring, Taking Medication and Healthy Coping    Patient needs further education on the following diabetes management concepts: Healthy Eating, Being Active, Monitoring, Taking Medication, Problem Solving, Reducing Risks and Healthy Coping    Based on learning assessment above, most appropriate setting for further diabetes education would be: Individual setting.    Education provided today on:  AADE Self-Care Behaviors:  Healthy Eating: consistency in amount, composition, and timing of food intake, portion control, label reading and meal planning and diet modification  Being Active: relationship to blood glucose and encouraged patient to continue to get more movement in every day  Monitoring: reviewed glucose results and looked at how some meals impacted her reading.   Reducing Risks: prevention, early diagnostic measures and treatment of complications and foot care  Healthy Coping: benefits of making appropriate lifestyle changes    Opportunities for ongoing education and support in diabetes-self management were discussed.    Pt verbalized understanding of concepts discussed and recommendations provided today.       Education Materials Provided:  Aguilar tierney  Foot Care    PLAN:  See Patient Instructions for co-developed, patient-stated behavior change goals.  AVS printed and provided to patient today. See Follow-Up section for recommended follow-up.  Test glucose 3-4 times per day  Look into the CARGO[employer] View sensor and to see if it is something you are interested in and contact your insurance about coverage.   Walk daily. Work on getting in more movement during your day by  walking.  Continue to eat three meals per day. Limit snacking to only when you truly need it.  Continue to use labels to look for portion sizes and carbohydrate.  Include more vegetables in your diet in the ways we discussed today.  Cut back on eating out and modified meals out as discussed today.   Follow up with your PCP.  Follow up with me in one week  Time Spent: 120 minutes  Encounter Type: Individual    Any diabetes medication dose changes were made via the CDE Protocol and Collaborative Practice Agreement with the patient's referring provider. A copy of this encounter was shared with the provider.

## 2018-10-03 NOTE — TELEPHONE ENCOUNTER
Pt walked in at [facility] office. She scheduled a follow up appointment with PCP.

## 2018-10-03 NOTE — MR AVS SNAPSHOT
After Visit Summary   10/3/2018    Brenda Renee    MRN: [id number]           Patient Information     Date Of Birth          1970        Visit Information        Provider Department      10/3/2018 9:30 AM Mikayla Peterson RD [facility]  [facility]        Today's Diagnoses     Type 2 diabetes mellitus with hyperglycemia, without long-term current use of insulin (H)           Follow-ups after your visit        Your next 10 appointments already scheduled     Oct 10, 2018 10:50 AM CDT   Office Visit with Chasidy Bajwa PA-C   [facility]    [address]  [facility] [address] [phone]           Bring a current list of meds and any records pertaining to this visit. For Physicals, please bring immunization records and any forms needing to be filled out. Please arrive 10 minutes early to complete paperwork.            Oct 12, 2018  9:30 AM CDT   Diabetes Education with Mikayla Peterson RD   [facility]  Di[facility] Education ([facility])    [address] [place] [id number] [phone]            Jan 21, 2019 11:00 AM CST   New Visit with Portillo Vazquez MD   [facility]    [address] [id number] [phone]              Who to contact     If you have questions or need follow up information about today's clinic visit or your schedule please contact [facility]  DIABETES EDUCATION directly at [phone].  Normal or non-critical lab and imaging results will be communicated to you by MyChart, letter or phone within 4 business days after the clinic has received the results. If you do not hear from us within 7 days, please contact the clinic through MyChart or phone. If you have a critical or abnormal lab result, we  will notify you by phone as soon as possible.  Submit refill requests through [employer] or call your pharmacy and they will forward the refill request to us. Please allow 3 business days for your refill to be completed.          Additional Information About Your Visit        Care EveryWhere ID     This is your Care EveryWhere ID. This could be used by other organizations to access your [place] medical records  [id number]        Your Vitals Were     BMI (Body Mass Index)                   39.34 kg/m2            Blood Pressure from Last 3 Encounters:   08/31/18 138/74   07/05/17 124/70   05/17/17 136/80    Weight from Last 3 Encounters:   10/03/18 104.8 kg (231 lb)   08/31/18 104.3 kg (230 lb)   07/05/17 114.8 kg (253 lb)              We Performed the Following     DIABETES EDUCATION - Individual  []          Today's Medication Changes          These changes are accurate as of 10/3/18 12:35 PM.  If you have any questions, ask your nurse or doctor.               These medicines have changed or have updated prescriptions.        Dose/Directions    blood glucose monitoring lancets   This may have changed:  additional instructions   Used for:  Type 2 diabetes mellitus with hyperglycemia, without long-term current use of insulin (H)   Changed by:  Mikayla Peterson RD        Use to test blood sugar 5 times daily or as directed.  Ok to substitute alternative if insurance prefers.   Quantity:  100 each   Refills:  11       blood glucose monitoring test strip   Commonly known as:  ONETOUCH VERIO IQ   This may have changed:  additional instructions   Used for:  Type 2 diabetes mellitus with hyperglycemia, without long-term current use of insulin (H)   Changed by:  Mikayla Peterson RD        Use to test blood sugar 5 times daily or as directed.  Ok to substitute alternative if insurance prefers.   Quantity:  150 strip   Refills:  11            Where to get your medicines      These medications were sent to [facility] - [address] [address]     Phone:  [phone]     blood glucose monitoring lancets    blood glucose monitoring test strip                Primary Care Provider Office Phone # Fax #    Chasidy Bajwa PA-C [phone]       [address]  [facility] [address]        Goals        Diet    Reduce portion size        Exercise    Exercise 3x per week (30 min per time)        General    Healthy Eating (pt-stated)     Notes - Note created  9/24/2018  3:55 PM by Mikayla Peterson, KIRSTIN    Continue to work on cutting intake of sweet drinks way back.  Keep a food journal for at least four days.  Continue to look at labels for carbohydrate, fiber, serving size.       Monitoring (pt-stated)     Notes - Note created  9/24/2018  3:54 PM by Mikayla Peterson, KIRSTIN    Test glucose twice daily.  Fasting and two hours after a meal.         Equal Access to Services     KAREL GAINES : Hadii ludwig bauman hadasho Soomaali, waaxda luqadaha, qaybta kaalmada adeegyada, waxay perla kaba . So [facility] [phone].    ATENCIÓN: Si habla español, tiene a siu disposición servicios gratuitos de asistencia lingüística. Llame al [phone].    We comply with applicable federal civil rights laws and [place] laws. We do not discriminate on the basis of race, color, national origin, age, disability, sex, sexual orientation, or gender identity.            Thank you!     Thank you for choosing [facility]  DIABETES EDUCATION  for your care. Our goal is always to provide you with excellent care. Hearing back from our patients is one way we can continue to improve our services. Please take a few minutes to complete the written survey that you may receive in the mail after your visit with us. Thank you!             Your Updated Medication List - Protect others around you: Learn how to safely use, store and throw away your medicines at  www.disposemymeds.org.          This list is accurate as of 10/3/18 12:35 PM.  Always use your most recent med list.                   Brand Name Dispense Instructions for use Diagnosis    albuterol 108 (90 Base) MCG/ACT inhaler    PROAIR HFA    1 Inhaler    Inhale 2 puffs into the lungs every 6 hours as needed    Acute bronchospasm       ALLEGRA 180 MG tablet   Generic drug:  fexofenadine      Take 1 tablet by mouth daily.    Somatic dysfunction of lower extremities, Somatic dysfunction of sacral region, Somatic dysfunction of pelvic region, Somatic dysfunction of lumbar region, Somatic dysfunction of thoracic region, Segmental and somatic dysfunction of rib cage, Somatic dysfunction of upper extremities, Somatic dysfunction of cervical region, Somatic dysfunction of spine affecting head region       blood glucose monitoring lancets     100 each    Use to test blood sugar 5 times daily or as directed.  Ok to substitute alternative if insurance prefers.    Type 2 diabetes mellitus with hyperglycemia, without long-term current use of insulin (H)       blood glucose monitoring test strip    ONETOUCH VERIO IQ    150 strip    Use to test blood sugar 5 times daily or as directed.  Ok to substitute alternative if insurance prefers.    Type 2 diabetes mellitus with hyperglycemia, without long-term current use of insulin (H)       cyclobenzaprine 10 MG tablet    FLEXERIL    30 tablet    Take 1 tablet (10 mg) by mouth 3 times daily as needed    Neck pain, Myofascial pain, Plantar fascial fibromatosis       HYDROcodone-acetaminophen 5-325 MG per tablet    NORCO    30 tablet    Take 1 tablet by mouth every 6 hours as needed for moderate to severe pain    Neck pain, Myofascial pain       ibuprofen 200 MG capsule      Take 200 mg by mouth as needed        LIDODERM 5 % Patch   Generic drug:  lidocaine           losartan 100 MG tablet    COZAAR    90 tablet    Take 1 tablet (100 mg) by mouth daily    Essential hypertension with  goal blood pressure less than 140/90       MAGNESIUM GLYCINATE PLUS PO      Take 200 mg by mouth 2 times daily        metFORMIN 500 MG 24 hr tablet    GLUCOPHAGE-XR    180 tablet    Take 2 tablets (1,000 mg) by mouth 2 times daily (with meals)    Type 2 diabetes mellitus with hyperglycemia, without long-term current use of insulin (H)       MULTI-VITAMIN DAILY Tabs      Take  by mouth.        vitamin D 2000 units Caps      Take 2-3 capsules by mouth daily.

## 2018-10-03 NOTE — TELEPHONE ENCOUNTER
Left voice message asking pt to call triage back. See notes below  from office visit 08/31/2018.      Blood sugar is much much worse from last year.  I have put in a diabetic education referral for you and it's very important you attend the class:     Your provider has referred you to Diabetes Education: [facility]: Diabetes Education - All [facility] [phone]   [email or web address]      I am starting you on a new medication called metformin which I want you to take 1 pill twice daily with meals for 1 week then increase to 2 pills twice with meals. We need to have a follow up medication check in 1 month.

## 2018-10-03 NOTE — TELEPHONE ENCOUNTER
Reason for Call: Request for an order or referral:    Order or referral being requested: A1C Lab     Date needed: as soon as possible    Has the patient been seen by the PCP for this problem? YES    Additional comments: Patient states MM asked she get her A1C rechecked in 1 month. Patient would like to come in this afternoon to get that done. Also wants to know when MM wants to follow up with Brenda again.     Phone number Patient can be reached at:  Home number on file [phone] (home)    Best Time:  anytime    Can we leave a detailed message on this number?  YES    Call taken on 10/3/2018 at 12:03 PM by Amanda Resendiz

## 2018-10-10 ENCOUNTER — OFFICE VISIT (OUTPATIENT)
Dept: FAMILY MEDICINE | Facility: CLINIC | Age: 48
End: 2018-10-10
Payer: COMMERCIAL

## 2018-10-10 VITALS
DIASTOLIC BLOOD PRESSURE: 66 MMHG | BODY MASS INDEX: 39.34 KG/M2 | OXYGEN SATURATION: 97 % | WEIGHT: 231 LBS | SYSTOLIC BLOOD PRESSURE: 124 MMHG | RESPIRATION RATE: 16 BRPM | TEMPERATURE: 98 F | HEART RATE: 78 BPM

## 2018-10-10 DIAGNOSIS — Z23 NEED FOR VACCINATION: ICD-10-CM

## 2018-10-10 DIAGNOSIS — E11.65 TYPE 2 DIABETES MELLITUS WITH HYPERGLYCEMIA, WITHOUT LONG-TERM CURRENT USE OF INSULIN (H): Primary | ICD-10-CM

## 2018-10-10 LAB — HBA1C MFR BLD: 11.2 % (ref 0–5.6)

## 2018-10-10 PROCEDURE — 99214 OFFICE O/P EST MOD 30 MIN: CPT | Mod: 25 | Performed by: PHYSICIAN ASSISTANT

## 2018-10-10 PROCEDURE — 36415 COLL VENOUS BLD VENIPUNCTURE: CPT | Performed by: PHYSICIAN ASSISTANT

## 2018-10-10 PROCEDURE — 99207 C FOOT EXAM  NO CHARGE: CPT | Performed by: PHYSICIAN ASSISTANT

## 2018-10-10 PROCEDURE — 90471 IMMUNIZATION ADMIN: CPT | Performed by: PHYSICIAN ASSISTANT

## 2018-10-10 PROCEDURE — 83036 HEMOGLOBIN GLYCOSYLATED A1C: CPT | Performed by: PHYSICIAN ASSISTANT

## 2018-10-10 PROCEDURE — 90732 PPSV23 VACC 2 YRS+ SUBQ/IM: CPT | Performed by: PHYSICIAN ASSISTANT

## 2018-10-10 NOTE — LETTER
October 11, 2018      Brenda Renee  [address] [place] [address]        Dear ,    We are writing to inform you of your test results.    Your A1c has improved so I don't want to change any medications at this point.  We should follow up in 2 months for a recheck and office visit.    Resulted Orders   Hemoglobin A1c   Result Value Ref Range    Hemoglobin A1C 11.2 (H) 0 - 5.6 %      Comment:      Normal <5.7% Prediabetes 5.7-6.4%  Diabetes 6.5% or higher - adopted from ADA   consensus guidelines.         If you have any questions or concerns, please call the clinic at the number listed above.       Sincerely,        Chasidy Bajwa PA-C

## 2018-10-10 NOTE — PROGRESS NOTES
SUBJECTIVE:   Brenda Renee is a 48 year old female who presents to clinic today for the following health issues:      Diabetes Follow-up      Patient is checking blood sugars: doing twice per day, sometimes will do three or four times    Diabetic concerns: None     Symptoms of hypoglycemia (low blood sugar): none     Paresthesias (numbness or burning in feet) or sores: Yes-becoming better since starting metformin and watching sugars     Date of last diabetic eye exam: August     BP Readings from Last 2 Encounters:   10/10/18 124/66   08/31/18 138/74     Hemoglobin A1C (%)   Date Value   08/31/2018 14.0 (H)   07/05/2017 7.0 (H)     LDL Cholesterol Calculated (mg/dL)   Date Value   08/31/2018 88   07/05/2017 91       Diabetes Management Resources    Amount of exercise or physical activity: 4-5 days/week for an average of 15-30 minutes, daily chores and walks stairs frequently    Problems taking medications regularly: No    Medication side effects: diarrhea    Diet: regular (no restrictions)    Reviewed and updated as needed this visit by clinical staff  Tobacco  Allergies  Meds  Problems  Med Hx  Surg Hx  Fam Hx  Soc Hx        Reviewed and updated as needed this visit by Provider  Tobacco  Allergies  Meds  Problems  Med Hx  Surg Hx  Fam Hx  Soc Hx        Additional complaints: None    HPI additional notes: Brenda presents today with   Chief Complaint   Patient presents with     Diabetes   Has not been checking her blood sugar right away in the morning but checks about 30 minutes later.  Sugars are usually on target but has some readings that are 200-220.  Can not always check her blood sugar after eating because she eats soon before bedtime.      Has stomach upset with metformin especially if she eats too many sugars too close together.      Thinks her weight is down because she weighed herself with her shoes today.      Wt Readings from Last 5 Encounters:   10/10/18 231 lb (104.8 kg)    10/03/18 231 lb (104.8 kg)   08/31/18 230 lb (104.3 kg)   07/05/17 253 lb (114.8 kg)   05/17/17 250 lb (113.4 kg)     Started metformin second week in Sept.       ROS:  C: Negative for fever, chills, recent change in weight  Skin: Negative for worrisome rashes or lesions  ENT: Negative for ear, mouth and throat problems  Resp: Negative for significant cough or SOB  CV: Negative for chest pain or peripheral edema  GI: Negative for nausea, abdominal pain, heartburn, or change in bowel habits  MS: Negative for significant arthralgias or myalgias  P: Negative for changes in mood or affect  ROS all other systems negative.    Chart Review:  History   Smoking Status     Never Smoker   Smokeless Tobacco     Never Used       Recent Labs   Lab Test  08/31/18   0838  07/05/17   1032  08/31/16   0841   05/15/15   0838   A1C  14.0*  7.0*  6.2*   < >   --    LDL  88  91  86   --   80   HDL  48*  42*  48*   --   53   TRIG  134  110  52   --   48   ALT  123*  64*   --    --   28   CR  0.70  0.85  0.97   --   1.00   GFRESTIMATED  89  71  62   --   60*   GFRESTBLACK  >90  86  75   --   72   POTASSIUM  4.1  4.1   --    --   3.6   TSH  2.26   --    --    --   1.61    < > = values in this interval not displayed.      BP Readings from Last 3 Encounters:   10/10/18 124/66   08/31/18 138/74   07/05/17 124/70    Wt Readings from Last 3 Encounters:   10/10/18 231 lb (104.8 kg)   10/03/18 231 lb (104.8 kg)   08/31/18 230 lb (104.3 kg)                  Patient Active Problem List   Diagnosis     Plantar fascial fibromatosis     Rectal tear     Neck pain     Myofascial pain     Microhematuria     Morbid obesity due to excess calories (H)     Dysmenorrhea     Perimenopausal     Essential hypertension with goal blood pressure less than 140/90     Bilateral low back pain without sciatica, unspecified chronicity     Chronic pain syndrome     Contact sensitivity to metal, current reaction     Bilateral hand numbness     Seasonal allergic rhinitis,  unspecified allergic rhinitis trigger     Bilateral thumb pain     Type 2 diabetes mellitus with hyperglycemia, without long-term current use of insulin (H)     Past Surgical History:   Procedure Laterality Date     BREAST BIOPSY, RT/LT  11/10/2008 [facility] - negative, 05/26/2010 [facility]per benign    right x 2 benign     COLONOSCOPY  1/7/2013    Procedure: COLONOSCOPY;  Colonoscopy;  Surgeon: Veronica Meyers MD;  Location: [facility]     HC TOOTH EXTRACTION W/FORCEP  1999    wisdom teeth     TONSILLECTOMY  1999     Problem list, Medication list, Allergies, Medical/Social/Surg hx reviewed in [facility], updated as appropriate.   OBJECTIVE:                                                    /66  Pulse 78  Temp 98  F (36.7  C) (Tympanic)  Resp 16  Wt 231 lb (104.8 kg)  SpO2 97%  BMI 39.34 kg/m2  Body mass index is 39.34 kg/(m^2).  GENERAL: healthy, alert, in no acute distress  EYES: Grossly normal to inspection, EOMI, PERRL  HENT: Mucous mebranes moist.  NECK: Non-tender, no adenopathy.  RESP: lungs clear to auscultation - no rales, no rhonchi, no wheezes  CV: regular rate and rhythm, normal S1 S2. No peripheral edema.  SKIN: no suspicious lesions, no rashes  PSYCH: Mental Status Exam  Behavior: cooperative and interruptive  Speech: rapid tangential  Mood: description consistent with euthymia  Affect: Euthymic  Thought Processes: Tangential  Thought Content: no evidence of suicidal or homicidal ideation, no overt psychosis and Preoccupations  Insight: Fair  Judgment: Fair  Foot Exam: normal DP and PT pulses, no trophic changes or ulcerative lesions and normal sensory exam      Diagnostic test results:  Pending     ASSESSMENT/PLAN:                                                          ICD-10-CM    1. Type 2 diabetes mellitus with hyperglycemia, without long-term current use of insulin (H) E11.65 Hemoglobin A1c     FOOT EXAM   2. Need for vaccination Z23 PNEUMOCOCCAL VACCINE,ADULT,SQ OR IM     VACCINE  ADMINISTRATION, INITIAL     Follow up on diabetes today.  Pt does have other concerns that she wanted to discuss as well but could not due to time constraints.    Discussed that if her A1c has not gone down far enough today, we may need to start her on a second oral medication.  She is allergic to sulfa so the next medication would likely be januvia or invokana based on insurance coverage.  Side effects of these medications were discussed.  Pt notes that she does have stomach upset from the metformin for which she occasional has pain significant enough that she cannot work.  She is having her employer send over intermittent [facility] paperwork on this topic.    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  She is supposed to remove all items from the cart rather than scanning them in the cart.  She will need a letter stating it is okay for her to do this for medical reasons.      25 minutes total spent with patient, 20 spent discussing the above plan.  Visit started at 11:18 and ended at 11:43.      Please see patient instructions for treatment details.    Return in about 3 months (around 1/10/2019) for Diabetes Follow Up.    Chasidy Bajwa PA-C  [facility]

## 2018-10-10 NOTE — NURSING NOTE
Screening Questionnaire for Adult Immunization    Are you sick today?   No   Do you have allergies to medications, food, a vaccine component or latex?   No   Have you ever had a serious reaction after receiving a vaccination?   No   Do you have a long-term health problem with heart disease, lung disease, asthma, kidney disease, metabolic disease (e.g. diabetes), anemia, or other blood disorder?   Yes   Do you have cancer, leukemia, HIV/AIDS, or any other immune system problem?   No   In the past 3 months, have you taken medications that affect  your immune system, such as prednisone, other steroids, or anticancer drugs; drugs for the treatment of rheumatoid arthritis, Crohn s disease, or psoriasis; or have you had radiation treatments?   No   Have you had a seizure, or a brain or other nervous system problem?   No   During the past year, have you received a transfusion of blood or blood     products, or been given immune (gamma) globulin or antiviral drug?   No   For women: Are you pregnant or is there a chance you could become        pregnant during the next month?   No   Have you received any vaccinations in the past 4 weeks?   No     Immunization questionnaire answers were all negative.        Per orders of JAY Azul, injection of Pneumovax 23 given by Siri Jones. Patient instructed to remain in clinic for 15 minutes afterwards, and to report any adverse reaction to me immediately.       Screening performed by Siri Jones on 10/10/2018 at 11:45 AM.

## 2018-10-10 NOTE — MR AVS SNAPSHOT
After Visit Summary   10/10/2018    Brenda Renee    MRN: [id number]           Patient Information     Date Of Birth          1970        Visit Information        Provider Department      10/10/2018 10:50 AM Chasidy Bajwa PA-C [facility]        Today's Diagnoses     Type 2 diabetes mellitus with hyperglycemia, without long-term current use of insulin (H)    -  1    Need for vaccination           Follow-ups after your visit        Follow-up notes from your care team     Return in about 3 months (around 1/10/2019) for Diabetes Follow Up.      Your next 10 appointments already scheduled     Oct 12, 2018  9:30 AM CDT   Diabetes Education with Mikayla Peterson RD   [facility] [place],  Diabetes Education ([facility])    [address] [place] [id number] [phone]            Jan 21, 2019 11:00 AM CST   [facility]    [address] [place] [id number] [phone]              Who to contact     If you have questions or need follow up information about today's clinic visit or your schedule please contact [facility] directly at [phone].  Normal or non-critical lab and imaging results will be communicated to you by MyChart, letter or phone within 4 business days after the clinic has received the results. If you do not hear from us within 7 days, please contact the clinic through MyChart or phone. If you have a critical or abnormal lab result, we will notify you by phone as soon as possible.  Submit refill requests through [employer] or call your pharmacy and they will forward the refill request to us. Please allow 3 business days for your refill to be completed.          Additional Information About Your Visit        Care EveryWhere ID     This is your  Care EveryWhere ID. This could be used by other organizations to access your [facility] medical records  [id number]        Your Vitals Were     Pulse Temperature Respirations Pulse Oximetry BMI (Body Mass Index)       78 98  F (36.7  C) (Tympanic) 16 97% 39.34 kg/m2        Blood Pressure from Last 3 Encounters:   10/10/18 124/66   08/31/18 138/74   07/05/17 124/70    Weight from Last 3 Encounters:   10/10/18 231 lb (104.8 kg)   10/03/18 231 lb (104.8 kg)   08/31/18 230 lb (104.3 kg)              We Performed the Following     FOOT EXAM     Hemoglobin A1c     PNEUMOCOCCAL VACCINE,ADULT,SQ OR IM     VACCINE ADMINISTRATION, INITIAL        Primary Care Provider Office Phone # Fax #    Chasidy Bajwa PA-C [phone] [address] [address]        Goals        Diet    Reduce portion size        Exercise    Exercise 3x per week (30 min per time)        General    Healthy Eating (pt-stated)     Notes - Note created  9/24/2018  3:55 PM by Mikayla Peterson, KIRSTIN    Continue to work on cutting intake of sweet drinks way back.  Keep a food journal for at least four days.  Continue to look at labels for carbohydrate, fiber, serving size.       Monitoring (pt-stated)     Notes - Note created  9/24/2018  3:54 PM by Mikayla Peterson, KIRSTIN    Test glucose twice daily.  Fasting and two hours after a meal.         Equal Access to Services     [facility]: Hadnikhil Conklin, waaxda luqadaha, qaybta kaalmada sammie valentin [facility] dee manuel. So [facility] [phone].    ATENCIÓN: Si habla español, tiene a siu disposición servicios gratuitos de asistencia lingüística. Llame al [phone].    We comply with applicable federal civil rights laws and [place] laws. We do not discriminate on the basis of race, color, national origin, age, disability, sex, sexual orientation, or gender identity.            Thank you!     Thank you for choosing [facility]  [facility]  [facility] your [facility]. Our goal is always to provide you with excellent care. Hearing back from our patients is one way we can continue to improve our services. Please take a few minutes to complete the written survey that you may receive in the mail after your visit with us. Thank you!             Your Updated Medication List - Protect others around you: Learn how to safely use, store and throw away your medicines at www.disposemymeds.org.          This list is accurate as of 10/10/18 12:33 PM.  Always use your most recent med list.                   Brand Name Dispense Instructions for use Diagnosis    albuterol 108 (90 Base) MCG/ACT inhaler    PROAIR HFA    1 Inhaler    Inhale 2 puffs into the lungs every 6 hours as needed    Acute bronchospasm       ALLEGRA 180 MG tablet   Generic drug:  fexofenadine      Take 1 tablet by mouth daily.    Somatic dysfunction of lower extremities, Somatic dysfunction of sacral region, Somatic dysfunction of pelvic region, Somatic dysfunction of lumbar region, Somatic dysfunction of thoracic region, Segmental and somatic dysfunction of rib cage, Somatic dysfunction of upper extremities, Somatic dysfunction of cervical region, Somatic dysfunction of spine affecting head region       blood glucose monitoring lancets     100 each    Use to test blood sugar 5 times daily or as directed.  Ok to substitute alternative if insurance prefers.    Type 2 diabetes mellitus with hyperglycemia, without long-term current use of insulin (H)       blood glucose monitoring test strip    ONETOUCH VERIO IQ    150 strip    Use to test blood sugar 5 times daily or as directed.  Ok to substitute alternative if insurance prefers.    Type 2 diabetes mellitus with hyperglycemia, without long-term current use of insulin (H)       cyclobenzaprine 10 MG tablet    FLEXERIL    30 tablet    Take 1 tablet (10 mg) by mouth 3 times daily as needed    Neck pain, Myofascial pain, Plantar fascial  fibromatosis       HYDROcodone-acetaminophen 5-325 MG per tablet    NORCO    30 tablet    Take 1 tablet by mouth every 6 hours as needed for moderate to severe pain    Neck pain, Myofascial pain       ibuprofen 200 MG capsule      Take 200 mg by mouth as needed        LIDODERM 5 % Patch   Generic drug:  lidocaine           losartan 100 MG tablet    COZAAR    90 tablet    Take 1 tablet (100 mg) by mouth daily    Essential hypertension with goal blood pressure less than 140/90       MAGNESIUM GLYCINATE PLUS PO      Take 200 mg by mouth 2 times daily        metFORMIN 500 MG 24 hr tablet    GLUCOPHAGE-XR    180 tablet    Take 2 tablets (1,000 mg) by mouth 2 times daily (with meals)    Type 2 diabetes mellitus with hyperglycemia, without long-term current use of insulin (H)       MULTI-VITAMIN DAILY Tabs      Take  by mouth.        vitamin D 2000 units Caps      Take 2-3 capsules by mouth daily.

## 2018-10-12 ENCOUNTER — ALLIED HEALTH/NURSE VISIT (OUTPATIENT)
Dept: EDUCATION SERVICES | Facility: CLINIC | Age: 48
End: 2018-10-12
Attending: PHYSICIAN ASSISTANT
Payer: COMMERCIAL

## 2018-10-12 VITALS — WEIGHT: 231 LBS | BODY MASS INDEX: 39.34 KG/M2

## 2018-10-12 DIAGNOSIS — E11.65 TYPE 2 DIABETES MELLITUS WITH HYPERGLYCEMIA, WITHOUT LONG-TERM CURRENT USE OF INSULIN (H): Primary | ICD-10-CM

## 2018-10-12 PROCEDURE — G0108 DIAB MANAGE TRN  PER INDIV: HCPCS | Performed by: NUTRITIONIST

## 2018-10-12 RX ORDER — FLASH GLUCOSE SENSOR
1 KIT MISCELLANEOUS
Qty: 2 EACH | Refills: 11 | Status: SHIPPED | OUTPATIENT
Start: 2018-10-12 | End: 2019-05-06

## 2018-10-12 RX ORDER — FLASH GLUCOSE SCANNING READER
1 EACH MISCELLANEOUS ONCE
Qty: 1 DEVICE | Refills: 0 | Status: SHIPPED | OUTPATIENT
Start: 2018-10-12 | End: 2018-10-12

## 2018-10-12 NOTE — PROGRESS NOTES
"Diabetes Self-Management Education & Support    Diabetes Education Self Management & Training    SUBJECTIVE/OBJECTIVE:  Presents for: Follow-up  Accompanied by: Self  Focus of Visit: Healthy Eating, Monitoring, Being Active  Diabetes type: Type 2  Disease course: Improving  Cultural Influences/Ethnic Background:  American    Diabetes Symptoms & Complications          Patient Problem List and Family Medical History reviewed for relevant medical history, current medical status, and diabetes risk factors.    Vitals:  Wt 104.8 kg (231 lb)  BMI 39.34 kg/m2  Estimated body mass index is 39.34 kg/(m^2) as calculated from the following:    Height as of 8/31/18: 1.632 m (5' 4.25\").    Weight as of this encounter: 104.8 kg (231 lb).   Last 3 BP:   BP Readings from Last 3 Encounters:   10/10/18 124/66   08/31/18 138/74   07/05/17 124/70       History   Smoking Status     Never Smoker   Smokeless Tobacco     Never Used       Labs:  Lab Results   Component Value Date    A1C 11.2 10/10/2018     Lab Results   Component Value Date     08/31/2018     Lab Results   Component Value Date    LDL 88 08/31/2018     HDL Cholesterol   Date Value Ref Range Status   08/31/2018 48 (L) >49 mg/dL Final   ]  GFR Estimate   Date Value Ref Range Status   08/31/2018 89 >60 mL/min/1.7m2 Final     Comment:     Non  GFR Calc     GFR Estimate If Black   Date Value Ref Range Status   08/31/2018 >90 >60 mL/min/1.7m2 Final     Comment:      GFR Calc     Lab Results   Component Value Date    CR 0.70 08/31/2018     No results found for: MICROALBUMIN    Healthy Eating  Healthy Eating Assessed Today: Yes  Patient on a regular basis: Eats 3 meals a day  Meal planning: Smaller portions  Meals include: Breakfast, Lunch, Dinner, Snacks  Breakfast - emma huma breakfast sandwich   Or 2 pieces bread with low sugar jam  Or milk plus carnation instant breakfast with a fruit  Or oat bran or grape nuts (she puts them in individual " serving baggies) with a piece of fruit if needed  Or egg beater with tortilla  Has water or milk with breakfast  Lunch - hard boiled egg, fruit, 1/2 combos individual pack and berries  Or nut butter sandwich  Or egg salad sandwich  Or if she gets fast food will just get 1-2 hamburgers and skip the fries plus a diet coke  Dinner - pulled pork with little BBQ on a bun  Or nathans hot dog with beans  Or more salads lately  Or fish sticks  Or tacos  Beverages - mostly water, using a little peter tea and diluting     Being Active  Being Active Assessed Today: Yes  Trying to walk the dogs more often - daily    Monitoring  Monitoring Assessed Today: Yes  Did patient bring glucose meter to appointment? : Yes  Blood Glucose Meter: One Touch          Taking Medications  Diabetes Medication(s)     Biguanides Sig    metFORMIN (GLUCOPHAGE-XR) 500 MG 24 hr tablet Take 2 tablets (1,000 mg) by mouth 2 times daily (with meals)          Taking Medication Assessed Today: Yes  Current Treatments: Oral Agent (monotherapy)  Problems taking diabetes medications regularly?: No  Diabetes medication side effects?: No  Treatment Compliance: All of the time    Problem Solving  Problem Solving Assessed Today: No    Reducing Risks  Reducing Risks Assessed Today: No    Healthy Coping  Healthy Coping Assessed Today: Yes  Emotional response to diabetes: Ready to learn  Stage of change: ACTION (Actively working towards change)  Patient Activation Measure Survey Score:  RUPERTO Score (Last Two) 8/31/2016   RUPERTO Raw Score 32   Activation Score 62.6   RUPERTO Level 3       ASSESSMENT:  Glucose is improving greatly over the past week with lifestyle changes and metformin.   Patient looked into the [employer] personal sensor and would like to go that route. Order placed today.     Patient's most recent   Lab Results   Component Value Date    A1C 11.2 10/10/2018    is not meeting goal of <7.0     INTERVENTION:   Diabetes knowledge and skills assessment:     Patient is  knowledgeable in diabetes management concepts related to: Healthy Eating, Being Active, Monitoring, Taking Medication and Healthy Coping    Patient needs further education on the following diabetes management concepts: Healthy Eating, Being Active and Monitoring    Based on learning assessment above, most appropriate setting for further diabetes education would be: Individual setting.    Education provided today on:  AADE Self-Care Behaviors:  Healthy Eating: consistency in amount, composition, and timing of food intake, portion control and meal planning. Reinforced changes.   Being Active: relationship to blood glucose and describe appropriate activity program  Monitoring: log and interpret results and use of glucose control solution  Briefly reviewed linclon in service guide    Opportunities for ongoing education and support in diabetes-self management were discussed.    Pt verbalized understanding of concepts discussed and recommendations provided today.       PLAN:  See Patient Instructions for co-developed, patient-stated behavior change goals.  AVS printed and provided to patient today. See Follow-Up section for recommended follow-up.  Order placed for [employer]. Patient will bring to follow up next week.  Continue to use portion control and to limit refined carbs, fried food and sweets.   Daily walks.   Time Spent: 60 minutes  Encounter Type: Individual    Any diabetes medication dose changes were made via the CDE Protocol and Collaborative Practice Agreement with the patient's referring provider. A copy of this encounter was shared with the provider.

## 2018-10-12 NOTE — MR AVS SNAPSHOT
After Visit Summary   10/12/2018    Brenda Renee    MRN: [id number]           Patient Information     Date Of Birth          1970        Visit Information        Provider Department      10/12/2018 9:30 AM Mikayla Peterson RD [facility],  Diabetes Education        Today's Diagnoses     Type 2 diabetes mellitus with hyperglycemia, without long-term current use of insulin (H)    -  1       Follow-ups after your visit        Your next 10 appointments already scheduled     Oct 19, 2018  9:30 AM CDT   Diabetes Education with Mikayla Peterson RD   [facility],  Diabetes Education ([facility])    [address] [place] [id number] [phone]            Dec 12, 2018 10:00 AM CST   LAB with BX LAB   [facility] ([facility])    [address] [place] [id number]   [phone]           Please do not eat 10-12 hours before your appointment if you are coming in fasting for labs on lipids, cholesterol, or glucose (sugar). This does not apply to pregnant women. Water, hot tea and black coffee (with nothing added) are okay. Do not drink other fluids, diet soda or chew gum.            Dec 14, 2018 10:30 AM CST   SHORT with Chasidy Bajwa [facility]-C   [facility]    [address] [place] [address] [address]  [facility] [phone]            Jan 21, 2019 11:00 AM CST   New Visit with Portillo Vazquez MD   [facility] ([facility])    [address] [id number] [phone]              Who to contact     If you have questions or need follow up information about today's clinic visit or your schedule please contact [facility],  DIABETES EDUCATION directly at [phone].  Normal or  non-critical lab and imaging results will be communicated to you by MyChart, letter or phone within 4 business days after the clinic has received the results. If you do not hear from us within 7 days, please contact the clinic through MyChart or phone. If you have a critical or abnormal lab result, we will notify you by phone as soon as possible.  Submit refill requests through [employer] or call your pharmacy and they will forward the refill request to us. Please allow 3 business days for your refill to be completed.          Additional Information About Your Visit        Care EveryWhere ID     This is your Care EveryWhere ID. This could be used by other organizations to access your [facility] medical records  [id number]        Your Vitals Were     BMI (Body Mass Index)                   39.34 kg/m2            Blood Pressure from Last 3 Encounters:   10/10/18 124/66   08/31/18 138/74   07/05/17 124/70    Weight from Last 3 Encounters:   10/12/18 104.8 kg (231 lb)   10/10/18 104.8 kg (231 lb)   10/03/18 104.8 kg (231 lb)              We Performed the Following     DIABETES EDUCATION - Individual  []          Today's Medication Changes          These changes are accurate as of 10/12/18  1:00 PM.  If you have any questions, ask your nurse or doctor.               Start taking these medicines.        Dose/Directions    FREESTYLE INESSA 14 DAY READER Maren   Used for:  Type 2 diabetes mellitus with hyperglycemia, without long-term current use of insulin (H)        Dose:  1 Device   1 Device once for 1 dose   Quantity:  1 Device   Refills:  0       FREESTYLE INESSA 14 DAY SENSOR Misc   Used for:  Type 2 diabetes mellitus with hyperglycemia, without long-term current use of insulin (H)        Dose:  1 kit   1 kit every 14 days   Quantity:  2 each   Refills:  11            Where to get your medicines      These medications were sent to [place] [id number] [place] [address] [address] [place] [address] [facility]  [address]     Phone:  [phone]     FREESTYLE INESSA 14 DAY READER Maren    FREESTYLE INESSA 14 DAY SENSOR [facility]                Primary Care Provider Office Phone # Fax #    Chasidy Bajwa PA-C [phone]       [address]  [facility] [address]        Goals        Diet    Reduce portion size        Exercise    Exercise 3x per week (30 min per time)        General    Healthy Eating (pt-stated)     Notes - Note created  9/24/2018  3:55 PM by Mikayla Peterson, KIRSTIN    Continue to work on cutting intake of sweet drinks way back.  Keep a food journal for at least four days.  Continue to look at labels for carbohydrate, fiber, serving size.       Monitoring (pt-stated)     Notes - Note created  9/24/2018  3:54 PM by Mikayla Peterson, KIRSTIN    Test glucose twice daily.  Fasting and two hours after a meal.         Equal Access to Services     [facility] : Hadii aad ku hadasho Soomaali, waaxda luqadaha, qaybta kaalmada adeegyada, waxay frannyin hayileana kaba . So [facility] [phone].    ATENCIÓN: Si habla español, tiene a siu disposición servicios gratuitos de asistencia lingüística. Llame al [phone].    We comply with applicable federal civil rights laws and [place] laws. We do not discriminate on the basis of race, color, national origin, age, disability, sex, sexual orientation, or gender identity.            Thank you!     Thank you for choosing [facility]  DIABETES EDUCATION  for your care. Our goal is always to provide you with excellent care. Hearing back from our patients is one way we can continue to improve our services. Please take a few minutes to complete the written survey that you may receive in the mail after your visit with us. Thank you!             Your Updated Medication List - Protect others around you: Learn how to safely use, store and throw away your medicines at www.disposemymeds.org.          This list is accurate as of 10/12/18  1:00 PM.  Always  use your most recent med list.                   Brand Name Dispense Instructions for use Diagnosis    albuterol 108 (90 Base) MCG/ACT inhaler    PROAIR HFA    1 Inhaler    Inhale 2 puffs into the lungs every 6 hours as needed    Acute bronchospasm       ALLEGRA 180 MG tablet   Generic drug:  fexofenadine      Take 1 tablet by mouth daily.    Somatic dysfunction of lower extremities, Somatic dysfunction of sacral region, Somatic dysfunction of pelvic region, Somatic dysfunction of lumbar region, Somatic dysfunction of thoracic region, Segmental and somatic dysfunction of rib cage, Somatic dysfunction of upper extremities, Somatic dysfunction of cervical region, Somatic dysfunction of spine affecting head region       blood glucose monitoring lancets     100 each    Use to test blood sugar 5 times daily or as directed.  Ok to substitute alternative if insurance prefers.    Type 2 diabetes mellitus with hyperglycemia, without long-term current use of insulin (H)       blood glucose monitoring test strip    ONETOUCH VERIO IQ    150 strip    Use to test blood sugar 5 times daily or as directed.  Ok to substitute alternative if insurance prefers.    Type 2 diabetes mellitus with hyperglycemia, without long-term current use of insulin (H)       cyclobenzaprine 10 MG tablet    FLEXERIL    30 tablet    Take 1 tablet (10 mg) by mouth 3 times daily as needed    Neck pain, Myofascial pain, Plantar fascial fibromatosis       FREESTYLE INESSA 14 DAY READER Maren     1 Device    1 Device once for 1 dose    Type 2 diabetes mellitus with hyperglycemia, without long-term current use of insulin (H)       FREESTYLE INESSA 14 DAY SENSOR Misc     2 each    1 kit every 14 days    Type 2 diabetes mellitus with hyperglycemia, without long-term current use of insulin (H)       HYDROcodone-acetaminophen 5-325 MG per tablet    NORCO    30 tablet    Take 1 tablet by mouth every 6 hours as needed for moderate to severe pain    Neck pain,  Myofascial pain       ibuprofen 200 MG capsule      Take 200 mg by mouth as needed        LIDODERM 5 % Patch   Generic drug:  lidocaine           losartan 100 MG tablet    COZAAR    90 tablet    Take 1 tablet (100 mg) by mouth daily    Essential hypertension with goal blood pressure less than 140/90       MAGNESIUM GLYCINATE PLUS PO      Take 200 mg by mouth 2 times daily        metFORMIN 500 MG 24 hr tablet    GLUCOPHAGE-XR    180 tablet    Take 2 tablets (1,000 mg) by mouth 2 times daily (with meals)    Type 2 diabetes mellitus with hyperglycemia, without long-term current use of insulin (H)       MULTI-VITAMIN DAILY Tabs      Take  by mouth.        vitamin D 2000 units Caps      Take 2-3 capsules by mouth daily.

## 2018-10-19 ENCOUNTER — ALLIED HEALTH/NURSE VISIT (OUTPATIENT)
Dept: EDUCATION SERVICES | Facility: CLINIC | Age: 48
End: 2018-10-19
Attending: PHYSICIAN ASSISTANT
Payer: COMMERCIAL

## 2018-10-19 DIAGNOSIS — E11.65 TYPE 2 DIABETES MELLITUS WITH HYPERGLYCEMIA, WITHOUT LONG-TERM CURRENT USE OF INSULIN (H): Primary | ICD-10-CM

## 2018-10-19 PROCEDURE — G0108 DIAB MANAGE TRN  PER INDIV: HCPCS | Performed by: NUTRITIONIST

## 2018-10-19 NOTE — MR AVS SNAPSHOT
After Visit Summary   10/19/2018    Brenda Renee    MRN: [id number]           Patient Information     Date Of Birth          1970        Visit Information        Provider Department      10/19/2018 9:30 AM Mikayla Peterson RD [facility], [facility],  Diabetes Education        Today's Diagnoses     Type 2 diabetes mellitus with hyperglycemia, without long-term current use of insulin (H)    -  1       Follow-ups after your visit        Follow-up notes from your care team     Return in about 1 week (around 10/26/2018).      Your next 10 appointments already scheduled     Oct 26, 2018 12:30 PM CDT   Diabetes Education with Mikayla Peterson RD   [facility], [place],  Diabetes Education ([facility])    [address] [place] [id number]   [phone]            Nov 05, 2018 10:30 AM CST   Diabetes Education [facility]   [facility])    [address] [place] [address]   [phone]            Dec 12, 2018 10:00 AM CST   LAB with BX LAB   [facility] ([facility])    [address] [place] [id number] [phone]           Please do not eat 10-12 hours before your appointment if you are coming in fasting for labs on lipids, cholesterol, or glucose (sugar). This does not apply to pregnant women. Water, hot tea and black coffee (with nothing added) are okay. Do not drink other fluids, diet soda or chew gum.            Dec 14, 2018 10:30 AM CST   SHORT with Chasidy Bajwa PA-C   [facility]    [address] [place] [id number] [phone]            Jan 21, 2019 11:00 AM CST   New Visit with Portillo Vazquez MD    [facility]    [address] [address] [phone]              Who to contact     If you have questions or need follow up information about today's clinic visit or your schedule please contact [facility]  [facility] directly at [phone].  Normal or non-critical lab and imaging results will be communicated to you by MyChart, letter or phone within 4 business days after the clinic has received the results. If you do not hear from us within 7 days, please contact the clinic through MyChart or phone. If you have a critical or abnormal lab result, we will notify you by phone as soon as possible.  Submit refill requests through [employer] or call your pharmacy and they will forward the refill request to us. Please allow 3 business days for your refill to be completed.          Additional Information About Your Visit        Care EveryWhere ID     This is your Care EveryWhere ID. This could be used by other organizations to access your [place] medical records  [id number]         Blood Pressure from Last 3 Encounters:   10/10/18 124/66   08/31/18 138/74   07/05/17 124/70    Weight from Last 3 Encounters:   10/12/18 104.8 kg (231 lb)   10/10/18 104.8 kg (231 lb)   10/03/18 104.8 kg (231 lb)              We Performed the Following     DIABETES EDUCATION - Individual  []        Primary Care Provider Office Phone # Fax #    Chasidy Bajwa PA-C [phone]       [address] [address]        Goals        Diet    Reduce portion size        Exercise    Exercise 3x per week (30 min per time)        General    Healthy Eating (pt-stated)     Notes - Note created  9/24/2018  3:55 PM by Mikayla Peterson RD    Continue to work on cutting intake of sweet drinks way back.  Keep a food journal for at least four days.  Continue to look at labels for carbohydrate, fiber, serving size.        Monitoring (pt-stated)     Notes - Note created  9/24/2018  3:54 PM by Mikayla Peterson RD    Test glucose twice daily.  Fasting and two hours after a meal.         Equal Access to Services     KAREL GAINES : Jovany bauman vishal Conklin, waaxda luqadaha, qaybta kaalmada barbie, sammie arshad paolodee dee aranda laAkbarileana . So [facility] [phone].    ATENCIÓN: Si habla español, tiene a siu disposición servicios gratuitos de asistencia lingüística. Llame al [phone].    We comply with applicable federal civil rights laws and [place] laws. We do not discriminate on the basis of race, color, national origin, age, disability, sex, sexual orientation, or gender identity.            Thank you!     Thank you for choosing [facility]  DIABETES EDUCATION  for your care. Our goal is always to provide you with excellent care. Hearing back from our patients is one way we can continue to improve our services. Please take a few minutes to complete the written survey that you may receive in the mail after your visit with us. Thank you!             Your Updated Medication List - Protect others around you: Learn how to safely use, store and throw away your medicines at www.disposemymeds.org.          This list is accurate as of 10/19/18 11:59 PM.  Always use your most recent med list.                   Brand Name Dispense Instructions for use Diagnosis    albuterol 108 (90 Base) MCG/ACT inhaler    PROAIR HFA    1 Inhaler    Inhale 2 puffs into the lungs every 6 hours as needed    Acute bronchospasm       ALLEGRA 180 MG tablet   Generic drug:  fexofenadine      Take 1 tablet by mouth daily.    Somatic dysfunction of lower extremities, Somatic dysfunction of sacral region, Somatic dysfunction of pelvic region, Somatic dysfunction of lumbar region, Somatic dysfunction of thoracic region, Segmental and somatic dysfunction of rib cage, Somatic dysfunction of upper extremities, Somatic dysfunction of cervical region, Somatic  dysfunction of spine affecting head region       blood glucose monitoring lancets     100 each    Use to test blood sugar 5 times daily or as directed.  Ok to substitute alternative if insurance prefers.    Type 2 diabetes mellitus with hyperglycemia, without long-term current use of insulin (H)       blood glucose monitoring test strip    ONETOUCH VERIO IQ    150 strip    Use to test blood sugar 5 times daily or as directed.  Ok to substitute alternative if insurance prefers.    Type 2 diabetes mellitus with hyperglycemia, without long-term current use of insulin (H)       cyclobenzaprine 10 MG tablet    FLEXERIL    30 tablet    Take 1 tablet (10 mg) by mouth 3 times daily as needed    Neck pain, Myofascial pain, Plantar fascial fibromatosis       FREESTYLE INESSA 14 DAY SENSOR Misc     2 each    1 kit every 14 days    Type 2 diabetes mellitus with hyperglycemia, without long-term current use of insulin (H)       HYDROcodone-acetaminophen 5-325 MG per tablet    NORCO    30 tablet    Take 1 tablet by mouth every 6 hours as needed for moderate to severe pain    Neck pain, Myofascial pain       ibuprofen 200 MG capsule      Take 200 mg by mouth as needed        LIDODERM 5 % Patch   Generic drug:  lidocaine           losartan 100 MG tablet    COZAAR    90 tablet    Take 1 tablet (100 mg) by mouth daily    Essential hypertension with goal blood pressure less than 140/90       MAGNESIUM GLYCINATE PLUS PO      Take 200 mg by mouth 2 times daily        metFORMIN 500 MG 24 hr tablet    GLUCOPHAGE-XR    180 tablet    Take 2 tablets (1,000 mg) by mouth 2 times daily (with meals)    Type 2 diabetes mellitus with hyperglycemia, without long-term current use of insulin (H)       MULTI-VITAMIN DAILY Tabs      Take  by mouth.        vitamin D 2000 units Caps      Take 2-3 capsules by mouth daily.

## 2018-10-22 NOTE — PROGRESS NOTES
Diabetes Self-Management Education & Support      Diabetes Self-Management Education & Support - Personal CGM Start    SUBJECTIVE/OBJECTIVE     Cultural Influences/Ethnic Background:  American    Patient seen today for Personal CGM Start:  Freestyle Lincoln System     Taking Medications  Diabetes Medication(s)     Biguanides Sig    metFORMIN (GLUCOPHAGE-XR) 500 MG 24 hr tablet Take 2 tablets (1,000 mg) by mouth 2 times daily (with meals)           Patient Activation Measure Survey Score:  RUPERTO Score (Last Two) 8/31/2016   RUPERTO Raw Score 32   Activation Score 62.6   RUPERTO Level 3       ASSESSMENT  Patient verbalized understanding of education provided today.    Opportunities for ongoing education and support in diabetes-self management were discussed.    Pt verbalized understanding of concepts discussed and recommendations provided today.       Education Materials Provided:  Adhesion Guide    CGM-specific education:   Freestyle Lincoln sensor: insertion technique, sensor site location and rotation, sensor wear, reasons to remove sensor (MRI, CT, diathermy), Vitamin C & Aspirin effects on sensor, Lincoln Henning: frequency of scanning sensor, length of time data is visible, use of built in glucose meter, Precision X-tra test strips, Use of trends and graphs for pattern management and problem solving, and [employer] software    PLAN  See Patient Instructions for co-developed, patient-stated behavior change goals.  AVS printed and provided to patient today. See Follow-Up section for recommended follow-up.  Time Spent: 60 minutes  Encounter Type: Individual    Any diabetes medication dose changes were made via the CDE Protocol and Collaborative Practice Agreement with the patient's referring provider. A copy of this encounter was shared with the provider.

## 2018-10-26 ENCOUNTER — ALLIED HEALTH/NURSE VISIT (OUTPATIENT)
Dept: EDUCATION SERVICES | Facility: CLINIC | Age: 48
End: 2018-10-26
Attending: PHYSICIAN ASSISTANT
Payer: COMMERCIAL

## 2018-10-26 DIAGNOSIS — E11.9 DIABETES MELLITUS, TYPE 2 (H): Primary | ICD-10-CM

## 2018-10-26 PROCEDURE — G0108 DIAB MANAGE TRN  PER INDIV: HCPCS | Performed by: NUTRITIONIST

## 2018-10-26 NOTE — MR AVS SNAPSHOT
After Visit Summary   10/26/2018    Brenda Renee    MRN: [id number]           Patient Information     Date Of Birth          1970        Visit Information        Provider Department      10/26/2018 12:30 PM [facility]        Today's Diagnoses     Diabetes mellitus, type 2 (H)    -  1       Follow-ups after your visit        Your next 10 appointments already scheduled     Nov 05, 2018 10:30 AM CST   Diabetes Education with Mikayla Peterson RD   [facility]  [facility] ([facility])    [address] [place] [address]   [phone]            Dec 12, 2018 10:00 AM CST   LAB with BX LAB   [facility] ([facility])    [address] [place] [id number] [phone]           Please do not eat 10-12 hours before your appointment if you are coming in fasting for labs on lipids, cholesterol, or glucose (sugar). This does not apply to pregnant women. Water, hot tea and black coffee (with nothing added) are okay. Do not drink other fluids, diet soda or chew gum.            Dec 14, 2018 10:30 AM CST   SHORT with Chasidy Bajwa PA-C   [facility] ([facility])    [address] [place] [phone]            Jan 21, 2019 11:00 AM CST   New Visit with Portillo Vazquez MD   [facility]    [address] [phone]              Who to contact     If you have questions or need follow up information about today's clinic visit or your schedule please contact [facility]  DIABETES EDUCATION directly at [phone].  Normal or non-critical lab and imaging results will be communicated to you by  MyChart, letter or phone within 4 business days after the clinic has received the results. If you do not hear from us within 7 days, please contact the clinic through MyChart or phone. If you have a critical or abnormal lab result, we will notify you by phone as soon as possible.  Submit refill requests through [employer] or call your pharmacy and they will forward the refill request to us. Please allow 3 business days for your refill to be completed.          Additional Information About Your Visit        Care EveryWhere ID     This is your Care EveryWhere ID. This could be used by other organizations to access your [place] medical records  [id number]         Blood Pressure from Last 3 Encounters:   10/10/18 124/66   08/31/18 138/74   07/05/17 124/70    Weight from Last 3 Encounters:   10/12/18 104.8 kg (231 lb)   10/10/18 104.8 kg (231 lb)   10/03/18 104.8 kg (231 lb)              We Performed the Following     DIABETES EDUCATION - Individual  []        Primary Care Provider Office Phone # Fax #    Chasidy Bajwa PA-C [phone]       [address] [address]        Goals        Diet    Reduce portion size        Exercise    Exercise 3x per week (30 min per time)        General    Healthy Eating (pt-stated)     Notes - Note created  9/24/2018  3:55 PM by Mikayla Peterson RD    Continue to work on cutting intake of sweet drinks way back.  Keep a food journal for at least four days.  Continue to look at labels for carbohydrate, fiber, serving size.       Monitoring (pt-stated)     Notes - Note created  9/24/2018  3:54 PM by Mikayla Peterson RD    Test glucose twice daily.  Fasting and two hours after a meal.         Equal Access to Services     [facility] : Jovany Conklin, wapanchitoda luneris, qaybta kaalmada barbie, sammie manuel. So [facility] [phone].    ATENCIÓN: Si habla español, tiene a siu disposición servicios  ana de asistencia lingüística. Maryjane au [phone].    We comply with applicable federal civil rights laws and [place] laws. We do not discriminate on the basis of race, color, national origin, age, disability, sex, sexual orientation, or gender identity.            Thank you!     Thank you for choosing [facility],  DIABETES EDUCATION  for your care. Our goal is always to provide you with excellent care. Hearing back from our patients is one way we can continue to improve our services. Please take a few minutes to complete the written survey that you may receive in the mail after your visit with us. Thank you!             Your Updated Medication List - Protect others around you: Learn how to safely use, store and throw away your medicines at www.disposemymeds.org.          This list is accurate as of 10/26/18 11:59 PM.  Always use your most recent med list.                   Brand Name Dispense Instructions for use Diagnosis    albuterol 108 (90 Base) MCG/ACT inhaler    PROAIR HFA    1 Inhaler    Inhale 2 puffs into the lungs every 6 hours as needed    Acute bronchospasm       ALLEGRA 180 MG tablet   Generic drug:  fexofenadine      Take 1 tablet by mouth daily.    Somatic dysfunction of lower extremities, Somatic dysfunction of sacral region, Somatic dysfunction of pelvic region, Somatic dysfunction of lumbar region, Somatic dysfunction of thoracic region, Segmental and somatic dysfunction of rib cage, Somatic dysfunction of upper extremities, Somatic dysfunction of cervical region, Somatic dysfunction of spine affecting head region       blood glucose monitoring lancets     100 each    Use to test blood sugar 5 times daily or as directed.  Ok to substitute alternative if insurance prefers.    Type 2 diabetes mellitus with hyperglycemia, without long-term current use of insulin (H)       blood glucose monitoring test strip    ONETOUCH VERIO IQ    150 strip    Use to test blood sugar 5 times daily or  as directed.  Ok to substitute alternative if insurance prefers.    Type 2 diabetes mellitus with hyperglycemia, without long-term current use of insulin (H)       cyclobenzaprine 10 MG tablet    FLEXERIL    30 tablet    Take 1 tablet (10 mg) by mouth 3 times daily as needed    Neck pain, Myofascial pain, Plantar fascial fibromatosis       FREESTYLE INESSA 14 DAY SENSOR Misc     2 each    1 kit every 14 days    Type 2 diabetes mellitus with hyperglycemia, without long-term current use of insulin (H)       HYDROcodone-acetaminophen 5-325 MG per tablet    NORCO    30 tablet    Take 1 tablet by mouth every 6 hours as needed for moderate to severe pain    Neck pain, Myofascial pain       ibuprofen 200 MG capsule      Take 200 mg by mouth as needed        LIDODERM 5 % Patch   Generic drug:  lidocaine           losartan 100 MG tablet    COZAAR    90 tablet    Take 1 tablet (100 mg) by mouth daily    Essential hypertension with goal blood pressure less than 140/90       MAGNESIUM GLYCINATE PLUS PO      Take 200 mg by mouth 2 times daily        metFORMIN 500 MG 24 hr tablet    GLUCOPHAGE-XR    180 tablet    Take 2 tablets (1,000 mg) by mouth 2 times daily (with meals)    Type 2 diabetes mellitus with hyperglycemia, without long-term current use of insulin (H)       MULTI-VITAMIN DAILY Tabs      Take  by mouth.        vitamin D 2000 units Caps      Take 2-3 capsules by mouth daily.

## 2018-10-30 NOTE — PROGRESS NOTES
"Diabetes Self-Management Education & Support    Diabetes Education Self Management & Training    SUBJECTIVE/OBJECTIVE:  Presents for: Follow-up  Accompanied by: Self  Diabetes education in the past 24mo: Yes  Focus of Visit: Monitoring  Diabetes type: Type 2  Disease course: Improving  Transportation concerns: No  Other concerns:: None  Cultural Influences/Ethnic Background:  American    Diabetes Symptoms & Complications          Patient Problem List and Family Medical History reviewed for relevant medical history, current medical status, and diabetes risk factors.    Vitals:  There were no vitals taken for this visit.  Estimated body mass index is 39.34 kg/(m^2) as calculated from the following:    Height as of 8/31/18: 1.632 m (5' 4.25\").    Weight as of 10/12/18: 104.8 kg (231 lb).   Last 3 BP:   BP Readings from Last 3 Encounters:   10/10/18 124/66   08/31/18 138/74   07/05/17 124/70       History   Smoking Status     Never Smoker   Smokeless Tobacco     Never Used       Labs:  Lab Results   Component Value Date    A1C 11.2 10/10/2018     Lab Results   Component Value Date     08/31/2018     Lab Results   Component Value Date    LDL 88 08/31/2018     HDL Cholesterol   Date Value Ref Range Status   08/31/2018 48 (L) >49 mg/dL Final   ]  GFR Estimate   Date Value Ref Range Status   08/31/2018 89 >60 mL/min/1.7m2 Final     Comment:     Non  GFR Calc     GFR Estimate If Black   Date Value Ref Range Status   08/31/2018 >90 >60 mL/min/1.7m2 Final     Comment:      GFR Calc     Lab Results   Component Value Date    CR 0.70 08/31/2018     No results found for: MICROALBUMIN    Healthy Eating  Healthy Eating Assessed Today: No  Cultural/Worship diet restrictions?: No    Being Active  Being Active Assessed Today: No    Monitoring  Monitoring Assessed Today: Yes  Did patient bring glucose meter to appointment? : Yes        [employer] reports were also reviewed today and show a pattern " of elevated glucose after breakfast.  Glucose is steadily improving.       Taking Medications  Diabetes Medication(s)     Biguanides Sig    metFORMIN (GLUCOPHAGE-XR) 500 MG 24 hr tablet Take 2 tablets (1,000 mg) by mouth 2 times daily (with meals)          Taking Medication Assessed Today: Yes  Problems taking diabetes medications regularly?: Yes  Diabetes medication side effects?: No    Problem Solving  Problem Solving Assessed Today: Yes  Hypoglycemia Frequency: Never    Reducing Risks  Reducing Risks Assessed Today: No    Healthy Coping  Healthy Coping Assessed Today: Yes  Emotional response to diabetes: Ready to learn  Stage of change: ACTION (Actively working towards change)  Difficulty affording diabetes management supplies?: No  Patient Activation Measure Survey Score:  RUPERTO Score (Last Two) 8/31/2016   RUPERTO Raw Score 32   Activation Score 62.6   RUPERTO Level 3       ASSESSMENT:      Goals Addressed as of 10/30/2018 at 11:20 AM             10/26/18   10/3/18      Healthy Eating (pt-stated)   On track  On track    Added 9/24/18 by Mikayla Peterson RD               Continue to work on cutting intake of sweet drinks way back.  Keep a food journal for at least four days.  Continue to look at labels for carbohydrate, fiber, serving size.         Monitoring (pt-stated)   On track  On track    Added 9/24/18 by Mikayla Peterson RD               Test glucose twice daily.  Fasting and two hours after a meal.         Reduce portion size   On track  On track    Added 9/23/15 by Chasidy Bajwa PA-C          Patient's most recent   Lab Results   Component Value Date    A1C 11.2 10/10/2018    is not meeting goal of <7.0    INTERVENTION:   Diabetes knowledge and skills assessment:     Patient needs further education on the following diabetes management concepts: Monitoring    Based on learning assessment above, most appropriate setting for further diabetes education would be: Individual setting.    Education  provided today on:  AADE Self-Care Behaviors:  Monitoring: log and interpret results   Patient requested assistance with placing a new lincoln sensor today. The current sensor was starting to fall off, so skin tac was used this time. Patient will call [employer] Lincoln customer service to receive a replacement sensor.     Opportunities for ongoing education and support in diabetes-self management were discussed.    Pt verbalized understanding of concepts discussed and recommendations provided today.       PLAN:  See Patient Instructions for co-developed, patient-stated behavior change goals.  AVS printed and provided to patient today. See Follow-Up section for recommended follow-up.  Watch carbs, particularly at breakfast - aim for 45-60 grams.  Try to go for a walk after breakfast.  Time Spent: 60 minutes  Encounter Type: Individual    Any diabetes medication dose changes were made via the CDE Protocol and Collaborative Practice Agreement with the patient's referring provider. A copy of this encounter was shared with the provider.

## 2018-11-05 ENCOUNTER — ALLIED HEALTH/NURSE VISIT (OUTPATIENT)
Dept: EDUCATION SERVICES | Facility: CLINIC | Age: 48
End: 2018-11-05
Attending: PHYSICIAN ASSISTANT
Payer: COMMERCIAL

## 2018-11-05 DIAGNOSIS — E11.9 TYPE 2 DIABETES MELLITUS (H): Primary | ICD-10-CM

## 2018-11-05 PROCEDURE — G0108 DIAB MANAGE TRN  PER INDIV: HCPCS | Performed by: NUTRITIONIST

## 2018-11-05 NOTE — MR AVS SNAPSHOT
After Visit Summary   11/5/2018    Brenda Renee    MRN: [id number]           Patient Information     Date Of Birth          1970        Visit Information        Provider Department      11/5/2018 10:30 AM Mikayla Peterson RD [facility]  [facility]        Today's Diagnoses     Type 2 diabetes mellitus (H)    -  1       Follow-ups after your visit        Your next 10 appointments already scheduled     Dec 10, 2018 10:30 AM CST   Diabetes Education with Mikayla Peterson RD   [facility]  [facility] ([facility])    [address] [place] [id number] [phone]            Dec 12, 2018 10:00 AM CST   LAB with BX LAB   [facility] ([facility])    [address] [place] [id number] [phone]           Please do not eat 10-12 hours before your appointment if you are coming in fasting for labs on lipids, cholesterol, or glucose (sugar). This does not apply to pregnant women. Water, hot tea and black coffee (with nothing added) are okay. Do not drink other fluids, diet soda or chew gum.            Dec 14, 2018 10:30 AM CST   SHORT with Chasidy Bajwa PA-C   [facility] ([facility])    [address] [place] [id number] [phone]            Jan 21, 2019 11:00 AM CST   New Visit with Portillo Vazquez MD   [facility]    [address] [phone]              Who to contact     If you have questions or need follow up information about today's clinic visit or your schedule please contact [facility]  DIABETES EDUCATION directly at [phone].  Normal or non-critical lab and imaging results will be communicated to you by  MyChart, letter or phone within 4 business days after the clinic has received the results. If you do not hear from us within 7 days, please contact the clinic through MyChart or phone. If you have a critical or abnormal lab result, we will notify you by phone as soon as possible.  Submit refill requests through [employer] or call your pharmacy and they will forward the refill request to us. Please allow 3 business days for your refill to be completed.          Additional Information About Your Visit        Care EveryWhere ID     This is your Care EveryWhere ID. This could be used by other organizations to access your [place] medical records  [id number]         Blood Pressure from Last 3 Encounters:   10/10/18 124/66   08/31/18 138/74   07/05/17 124/70    Weight from Last 3 Encounters:   10/12/18 104.8 kg (231 lb)   10/10/18 104.8 kg (231 lb)   10/03/18 104.8 kg (231 lb)              We Performed the Following     DIABETES EDUCATION - Individual  []          Today's Medication Changes          These changes are accurate as of 11/5/18 11:59 PM.  If you have any questions, ask your nurse or doctor.               Start taking these medicines.        Dose/Directions    SKIN TAC ADHESIVE BARRIER WIPE Misc   Used for:  Type 2 diabetes mellitus (H)        Dose:  1 pad   1 pad every 14 days   Quantity:  50 each   Refills:  3            Where to get your medicines      These medications were sent to [place] [address] [place] [address] [address]     Phone:  [phone]     SKIN TAC ADHESIVE BARRIER WIPE Misc                Primary Care Provider Office Phone # Fax #    Chasidy Bajwa PA-C [phone]       [address] [facility] [address]  [facility] [address]        Goals        Diet    Reduce portion size        Exercise    Exercise 3x per week (30 min per time)        General    Healthy Eating (pt-stated)     Notes - Note created  9/24/2018  3:55 PM  by Mikayla Peterson RD    Continue to work on cutting intake of sweet drinks way back.  Keep a food journal for at least four days.  Continue to look at labels for carbohydrate, fiber, serving size.       Monitoring (pt-stated)     Notes - Note created  9/24/2018  3:54 PM by Mikayla Peterson RD    Test glucose twice daily.  Fasting and two hours after a meal.         Equal Access to Services     [facility]: Hadii aad ku hadasho Soomaali, waaxda luqadaha, qaybta kaalmada adeegyada, waxay idiin hayaan adeeg khdenissh la'aan . So [facility] [phone].    ATENCIÓN: Si habla español, tiene a siu disposición servicios gratuitos de asistencia lingüística. Llame al [phone].    We comply with applicable federal civil rights laws and [place] laws. We do not discriminate on the basis of race, color, national origin, age, disability, sex, sexual orientation, or gender identity.            Thank you!     Thank you for choosing [facility]  DIABETES EDUCATION  for your care. Our goal is always to provide you with excellent care. Hearing back from our patients is one way we can continue to improve our services. Please take a few minutes to complete the written survey that you may receive in the mail after your visit with us. Thank you!             Your Updated Medication List - Protect others around you: Learn how to safely use, store and throw away your medicines at www.disposemymeds.org.          This list is accurate as of 11/5/18 11:59 PM.  Always use your most recent med list.                   Brand Name Dispense Instructions for use Diagnosis    albuterol 108 (90 Base) MCG/ACT inhaler    PROAIR HFA    1 Inhaler    Inhale 2 puffs into the lungs every 6 hours as needed    Acute bronchospasm       ALLEGRA 180 MG tablet   Generic drug:  fexofenadine      Take 1 tablet by mouth daily.    Somatic dysfunction of lower extremities, Somatic dysfunction of sacral region, Somatic dysfunction of pelvic region, Somatic  dysfunction of lumbar region, Somatic dysfunction of thoracic region, Segmental and somatic dysfunction of rib cage, Somatic dysfunction of upper extremities, Somatic dysfunction of cervical region, Somatic dysfunction of spine affecting head region       blood glucose monitoring lancets     100 each    Use to test blood sugar 5 times daily or as directed.  Ok to substitute alternative if insurance prefers.    Type 2 diabetes mellitus with hyperglycemia, without long-term current use of insulin (H)       blood glucose monitoring test strip    ONETOUCH VERIO IQ    150 strip    Use to test blood sugar 5 times daily or as directed.  Ok to substitute alternative if insurance prefers.    Type 2 diabetes mellitus with hyperglycemia, without long-term current use of insulin (H)       cyclobenzaprine 10 MG tablet    FLEXERIL    30 tablet    Take 1 tablet (10 mg) by mouth 3 times daily as needed    Neck pain, Myofascial pain, Plantar fascial fibromatosis       FREESTYLE INESSA 14 DAY SENSOR Misc     2 each    1 kit every 14 days    Type 2 diabetes mellitus with hyperglycemia, without long-term current use of insulin (H)       HYDROcodone-acetaminophen 5-325 MG per tablet    NORCO    30 tablet    Take 1 tablet by mouth every 6 hours as needed for moderate to severe pain    Neck pain, Myofascial pain       ibuprofen 200 MG capsule    ADVIL/MOTRIN     Take 200 mg by mouth as needed        LIDODERM 5 % Patch   Generic drug:  lidocaine           losartan 100 MG tablet    COZAAR    90 tablet    Take 1 tablet (100 mg) by mouth daily    Essential hypertension with goal blood pressure less than 140/90       MAGNESIUM GLYCINATE PLUS PO      Take 200 mg by mouth 2 times daily        metFORMIN 500 MG 24 hr tablet    GLUCOPHAGE-XR    180 tablet    Take 2 tablets (1,000 mg) by mouth 2 times daily (with meals)    Type 2 diabetes mellitus with hyperglycemia, without long-term current use of insulin (H)       MULTI-VITAMIN DAILY Tabs       Take  by mouth.        SKIN TAC ADHESIVE BARRIER WIPE Misc     50 each    1 pad every 14 days    Type 2 diabetes mellitus (H)       vitamin D 2000 units Caps      Take 2-3 capsules by mouth daily.

## 2018-11-19 ENCOUNTER — ALLIED HEALTH/NURSE VISIT (OUTPATIENT)
Dept: EDUCATION SERVICES | Facility: CLINIC | Age: 48
End: 2018-11-19
Attending: PHYSICIAN ASSISTANT
Payer: COMMERCIAL

## 2018-11-19 DIAGNOSIS — E11.65 TYPE 2 DIABETES MELLITUS WITH HYPERGLYCEMIA, WITHOUT LONG-TERM CURRENT USE OF INSULIN (H): Primary | ICD-10-CM

## 2018-11-19 PROCEDURE — G0108 DIAB MANAGE TRN  PER INDIV: HCPCS | Performed by: NUTRITIONIST

## 2018-11-19 NOTE — MR AVS SNAPSHOT
After Visit Summary   11/19/2018    Brenda Renee    MRN: [id number]           Patient Information     Date Of Birth          1970        Visit Information        Provider Department      11/19/2018 10:30 AM [facility] Peterson RD [facility]  [facility]        Today's Diagnoses     Type 2 diabetes mellitus with hyperglycemia, without long-term current use of insulin (H)    -  1       Follow-ups after your visit        Your next 10 appointments already scheduled     Dec 10, 2018 10:30 AM CST   Diabetes Education with Mikayla Peterson RD   [facility]  [facility] ([facility])    [address] [place] [id number] [phone]            Dec 12, 2018 10:00 AM CST   LAB with BX LAB   [facility] ([facility])    [address] [place] [phone]   [phone]           Please do not eat 10-12 hours before your appointment if you are coming in fasting for labs on lipids, cholesterol, or glucose (sugar). This does not apply to pregnant women. Water, hot tea and black coffee (with nothing added) are okay. Do not drink other fluids, diet soda or chew gum.            Dec 14, 2018 10:30 AM CST   SHORT with Chasidy Bajwa PA-C   [facility] ([facility])    [address] [place] [address] [address]  [facility] [id number] [phone]            Jan 21, 2019 11:00 AM CST   New Visit with Portillo Vazquez MD   [facility] ([facility])    [address] [phone]              Who to contact     If you have questions or need follow up information about today's clinic visit or your schedule please contact [facility]  DIABETES EDUCATION directly at [phone].  Normal or  non-critical lab and imaging results will be communicated to you by MyChart, letter or phone within 4 business days after the clinic has received the results. If you do not hear from us within 7 days, please contact the clinic through MyChart or phone. If you have a critical or abnormal lab result, we will notify you by phone as soon as possible.  Submit refill requests through [employer] or call your pharmacy and they will forward the refill request to us. Please allow 3 business days for your refill to be completed.          Additional Information About Your Visit        Care EveryWhere ID     This is your Care EveryWhere ID. This could be used by other organizations to access your [place] medical records  [id number]        Your Vitals Were     BMI (Body Mass Index)                   39.68 kg/m2            Blood Pressure from Last 3 Encounters:   10/10/18 124/66   08/31/18 138/74   07/05/17 124/70    Weight from Last 3 Encounters:   11/20/18 105.7 kg (233 lb)   10/12/18 104.8 kg (231 lb)   10/10/18 104.8 kg (231 lb)              We Performed the Following     DIABETES EDUCATION - Individual  []        Primary Care Provider Office Phone # Fax #    Chasidy Bajwa PA-C [phone]       [address] [address]        Goals        Diet    Reduce portion size        Exercise    Exercise 10 minutes per day        General    Healthy Eating (pt-stated)     Notes - Note edited  11/20/2018 10:06 AM by Mikayla Peterson RD    Reduce portions of some of the high fat foods you are eating, like cheese. Try lighter cheeses like laughing cow.   Cut back on sweets, like candy.  Be sure to dilute any drinks with sugar so that the sugary beverage is no more than 4 ounces plus water.         Monitoring (pt-stated)     Notes - Note created  9/24/2018  3:54 PM by Mikayla Peterson RD    Test glucose twice daily.  Fasting and two hours after a meal.         Equal Access to Services      [facility] : Hadii aad ku vishal Sonabila, waaxda luqadaha, qaybta kaalmada adeshanstephanie, waxtayo perla ortegadenishair kaba . So [facility] [phone].    ATENCIÓN: Si habla español, tiene a siu disposición servicios gratuitos de asistencia lingüística. Llame al [phone].    We comply with applicable federal civil rights laws and [place] laws. We do not discriminate on the basis of race, color, national origin, age, disability, sex, sexual orientation, or gender identity.            Thank you!     Thank you for choosing [facility]  DIABETES EDUCATION  for your care. Our goal is always to provide you with excellent care. Hearing back from our patients is one way we can continue to improve our services. Please take a few minutes to complete the written survey that you may receive in the mail after your visit with us. Thank you!             Your Updated Medication List - Protect others around you: Learn how to safely use, store and throw away your medicines at www.disposemymeds.org.          This list is accurate as of 11/19/18 11:59 PM.  Always use your most recent med list.                   Brand Name Dispense Instructions for use Diagnosis    albuterol 108 (90 Base) MCG/ACT inhaler    PROAIR HFA    1 Inhaler    Inhale 2 puffs into the lungs every 6 hours as needed    Acute bronchospasm       ALLEGRA 180 MG tablet   Generic drug:  fexofenadine      Take 1 tablet by mouth daily.    Somatic dysfunction of lower extremities, Somatic dysfunction of sacral region, Somatic dysfunction of pelvic region, Somatic dysfunction of lumbar region, Somatic dysfunction of thoracic region, Segmental and somatic dysfunction of rib cage, Somatic dysfunction of upper extremities, Somatic dysfunction of cervical region, Somatic dysfunction of spine affecting head region       blood glucose monitoring lancets     100 each    Use to test blood sugar 5 times daily or as directed.  Ok to substitute alternative if insurance  prefers.    Type 2 diabetes mellitus with hyperglycemia, without long-term current use of insulin (H)       blood glucose monitoring test strip    ONETOUCH VERIO IQ    150 strip    Use to test blood sugar 5 times daily or as directed.  Ok to substitute alternative if insurance prefers.    Type 2 diabetes mellitus with hyperglycemia, without long-term current use of insulin (H)       cyclobenzaprine 10 MG tablet    FLEXERIL    30 tablet    Take 1 tablet (10 mg) by mouth 3 times daily as needed    Neck pain, Myofascial pain, Plantar fascial fibromatosis       FREESTYLE INESSA 14 DAY SENSOR Misc     2 each    1 kit every 14 days    Type 2 diabetes mellitus with hyperglycemia, without long-term current use of insulin (H)       HYDROcodone-acetaminophen 5-325 MG per tablet    NORCO    30 tablet    Take 1 tablet by mouth every 6 hours as needed for moderate to severe pain    Neck pain, Myofascial pain       ibuprofen 200 MG capsule    ADVIL/MOTRIN     Take 200 mg by mouth as needed        LIDODERM 5 % Patch   Generic drug:  lidocaine           losartan 100 MG tablet    COZAAR    90 tablet    Take 1 tablet (100 mg) by mouth daily    Essential hypertension with goal blood pressure less than 140/90       MAGNESIUM GLYCINATE PLUS PO      Take 200 mg by mouth 2 times daily        metFORMIN 500 MG 24 hr tablet    GLUCOPHAGE-XR    180 tablet    Take 2 tablets (1,000 mg) by mouth 2 times daily (with meals)    Type 2 diabetes mellitus with hyperglycemia, without long-term current use of insulin (H)       MULTI-VITAMIN DAILY Tabs      Take  by mouth.        SKIN TAC ADHESIVE BARRIER WIPE Misc     50 each    1 pad every 14 days    Type 2 diabetes mellitus (H)       vitamin D 2000 units Caps      Take 2-3 capsules by mouth daily.

## 2018-11-19 NOTE — PROGRESS NOTES
"Diabetes Self-Management Education & Support    Diabetes Education Self Management & Training    SUBJECTIVE/OBJECTIVE:     Cultural Influences/Ethnic Background:  American      Diabetes Symptoms & Complications          Patient Problem List and Family Medical History reviewed for relevant medical history, current medical status, and diabetes risk factors.    Vitals:  There were no vitals taken for this visit.  Estimated body mass index is 39.34 kg/(m^2) as calculated from the following:    Height as of 8/31/18: 1.632 m (5' 4.25\").    Weight as of 10/12/18: 104.8 kg (231 lb).   Last 3 BP:   BP Readings from Last 3 Encounters:   10/10/18 124/66   08/31/18 138/74   07/05/17 124/70       History   Smoking Status     Never Smoker   Smokeless Tobacco     Never Used       Labs:  Lab Results   Component Value Date    A1C 11.2 10/10/2018     Lab Results   Component Value Date     08/31/2018     Lab Results   Component Value Date    LDL 88 08/31/2018     HDL Cholesterol   Date Value Ref Range Status   08/31/2018 48 (L) >49 mg/dL Final   ]  GFR Estimate   Date Value Ref Range Status   08/31/2018 89 >60 mL/min/1.7m2 Final     Comment:     Non  GFR Calc     GFR Estimate If Black   Date Value Ref Range Status   08/31/2018 >90 >60 mL/min/1.7m2 Final     Comment:      GFR Calc     Lab Results   Component Value Date    CR 0.70 08/31/2018     No results found for: MICROALBUMIN    Healthy Eating  Healthy Eating Assessed Today: Yes  Cultural/Worship diet restrictions?: No  Patient on a regular basis: Eats 3 meals a day  Breakfast: egg bake or egg beater and american cheese and sausage with 1/2 flour tortilla or bowl cereal (captain crunch) or hard boiled egg or CIB with 16 ounces milk  Lunch: tuna and crackers or P3 with water or 1/2 combos pack or freeze dried fruit or pistachios   Dinner: steamed veg, 1/2 baked potato, beef and mushroom, dinner roll with honey maple butter or spring and cheddar " soup  Other: eating smaller portions, eating less when ordering pizza, avoiding regular soda    Being Active  Being Active Assessed Today: No    Monitoring  Monitoring Assessed Today: Yes  Did patient bring glucose meter to appointment? : Yes    Glucose in target 86% of the time, above target 13% and below target 1%  Pattern shows a rise in glucose shortly after waking before dinner.   This may be stress related as patient states she is stressed getting ready and caring for her dogs in the morning.     Taking Medications  Diabetes Medication(s)     Biguanides Sig    metFORMIN (GLUCOPHAGE-XR) 500 MG 24 hr tablet Take 2 tablets (1,000 mg) by mouth 2 times daily (with meals)          Taking Medication Assessed Today: Yes  Problems taking diabetes medications regularly?: Yes  Diabetes medication side effects?: No    Problem Solving  Problem Solving Assessed Today: Yes    Reducing Risks  Reducing Risks Assessed Today: No    Healthy Coping  Healthy Coping Assessed Today: Yes  Emotional response to diabetes: Ready to learn  Stage of change: ACTION (Actively working towards change)  Difficulty affording diabetes management supplies?: No  Patient Activation Measure Survey Score:  RUPERTO Score (Last Two) 8/31/2016   RUPERTO Raw Score 32   Activation Score 62.6   RUPERTO Level 3       ASSESSMENT:  Average glucose 138 mg/dL        Patient's most recent   Lab Results   Component Value Date    A1C 11.2 10/10/2018    is not meeting goal of <7.0    INTERVENTION:   Reviewed peggy reports with patient  Discussed some stress management techniques for coping with stress  Opportunities for ongoing education and support in diabetes-self management were discussed.    Pt verbalized understanding of concepts discussed and recommendations provided today.       PLAN:  See Patient Instructions for co-developed, patient-stated behavior change goals.  AVS printed and provided to patient today. See Follow-Up section for recommended follow-up.  No changes to  current plan  Time Spent: 30 minutes  Encounter Type: Individual    Any diabetes medication dose changes were made via the CDE Protocol and Collaborative Practice Agreement with the patient's referring provider. A copy of this encounter was shared with the provider.

## 2018-11-20 VITALS — WEIGHT: 233 LBS | BODY MASS INDEX: 39.68 KG/M2

## 2018-11-20 NOTE — PROGRESS NOTES
"Diabetes Self-Management Education & Support    Diabetes Education Self Management & Training    SUBJECTIVE/OBJECTIVE:  Presents for: Follow-up  Accompanied by: Self  Diabetes education in the past 24mo: Yes  Focus of Visit: Monitoring, Healthy Eating, Being Active  Diabetes type: Type 2  Disease course: Improving  Transportation concerns: No  Other concerns:: None  Cultural Influences/Ethnic Background:  American    Diabetes Symptoms & Complications          Patient Problem List and Family Medical History reviewed for relevant medical history, current medical status, and diabetes risk factors.    Vitals:  Wt 105.7 kg (233 lb)  BMI 39.68 kg/m2  Estimated body mass index is 39.68 kg/(m^2) as calculated from the following:    Height as of 8/31/18: 1.632 m (5' 4.25\").    Weight as of this encounter: 105.7 kg (233 lb).   Last 3 BP:   BP Readings from Last 3 Encounters:   10/10/18 124/66   08/31/18 138/74   07/05/17 124/70       History   Smoking Status     Never Smoker   Smokeless Tobacco     Never Used       Labs:  Lab Results   Component Value Date    A1C 11.2 10/10/2018     Lab Results   Component Value Date     08/31/2018     Lab Results   Component Value Date    LDL 88 08/31/2018     HDL Cholesterol   Date Value Ref Range Status   08/31/2018 48 (L) >49 mg/dL Final   ]  GFR Estimate   Date Value Ref Range Status   08/31/2018 89 >60 mL/min/1.7m2 Final     Comment:     Non  GFR Calc     GFR Estimate If Black   Date Value Ref Range Status   08/31/2018 >90 >60 mL/min/1.7m2 Final     Comment:      GFR Calc     Lab Results   Component Value Date    CR 0.70 08/31/2018     No results found for: MICROALBUMIN    Healthy Eating  Healthy Eating Assessed Today: Yes  Cultural/Church diet restrictions?: No  Patient is having sausage, fruit and mini quiche for breakfast  Snacks are wheat thins or ritz crackers and cheese spread or goat cheese  She's been drinking a little more mari juice " lately  Feels she is eating too much cheese and has been grazing on candy since halloween because it's been in the house.  Patient states however that she is thinking about food differently. Is more aware of what she is eating and how it may impact her health and goals. She is making healthier choices when eating out and is eating smaller portions, reports she still feels satisfied. She would like to start focusing more on losing weight and understands that she will need to make further lifestyle changes in order to do so.  She does not want to have to count calories or keep a food journal.     Being Active  Being Active Assessed Today: No  Is willing to start with 10 minutes of walking every day.    Monitoring  Monitoring Assessed Today: Yes  Did patient bring glucose meter to appointment? : Yes  See [facility] reports attached to this visit.  Average glucose 122 mg/dL  In target range 98% of the time  Above 180 mg/dL 1% of the time  Below 70 mg/dL 1% of the time  Patient reports feeling feeling nauseated two hours after eating roast with carrot and potato and some candy. Her glucose was tested 68 on her meter and 62, 58 on her [facility] sensor. Patient states she had a snack and felt better.     Taking Medications  Diabetes Medication(s)     Biguanides Sig    metFORMIN (GLUCOPHAGE-XR) 500 MG 24 hr tablet Take 2 tablets (1,000 mg) by mouth 2 times daily (with meals)          Taking Medication Assessed Today: Yes  Problems taking diabetes medications regularly?: Yes  Diabetes medication side effects?: No    Problem Solving  Problem Solving Assessed Today: Yes    Healthy Coping  Healthy Coping Assessed Today: Yes  Emotional response to diabetes: Ready to learn  Stage of change: ACTION (Actively working towards change)  Patient Activation Measure Survey Score:  RUPERTO Score (Last Two) 8/31/2016   RUPERTO Raw Score 32   Activation Score 62.6   RUPERTO Level 3       ASSESSMENT:  Glucose is well controlled based on available data from [facility]  reports.  Patient would like to start working more on weight loss and was able to develop more goals today.    Goals Addressed as of 11/20/2018 at 10:24 AM             11/19/18   10/26/18      Exercise 10 minutes per day   On track      Added 9/23/15 by Chasidy Bajwa PA-C      Healthy Eating (pt-stated)   On track  On track    Added 9/24/18 by Mikayla Peterson RD               Reduce portions of some of the high fat foods you are eating, like cheese. Try lighter cheeses like laughing cow.   Cut back on sweets, like candy.  Be sure to dilute any drinks with sugar so that the sugary beverage is no more than 4 ounces plus water.           Monitoring (pt-stated)   On track  On track    Added 9/24/18 by Mikayla Peterson RD               Test glucose twice daily.  Fasting and two hours after a meal.         Reduce portion size   On track  On track    Added 9/23/15 by Chasidy Bajwa PA-C          Patient's most recent   Lab Results   Component Value Date    A1C 11.2 10/10/2018    is not meeting goal of <7.0    INTERVENTION:     Education provided today on:  AADE Self-Care Behaviors:  Healthy Eating: weight reduction  Being Active: relationship to blood glucose  Monitoring: log and interpret results  Healthy Coping: benefits of making appropriate lifestyle changes    Opportunities for ongoing education and support in diabetes-self management were discussed.    Pt verbalized understanding of concepts discussed and recommendations provided today.       Education Materials Provided:  No new materials provided today    PLAN:  See Patient Instructions for co-developed, patient-stated behavior change goals.  AVS printed and provided to patient today. See Follow-Up section for recommended follow-up.  See goals  Time Spent: 60 minutes  Encounter Type: Individual    Any diabetes medication dose changes were made via the CDE Protocol and Collaborative Practice Agreement with the patient's referring  provider. A copy of this encounter was shared with the provider.

## 2018-12-12 DIAGNOSIS — E11.65 TYPE 2 DIABETES MELLITUS WITH HYPERGLYCEMIA, WITHOUT LONG-TERM CURRENT USE OF INSULIN (H): ICD-10-CM

## 2018-12-12 DIAGNOSIS — E11.65 TYPE 2 DIABETES MELLITUS WITH HYPERGLYCEMIA, WITHOUT LONG-TERM CURRENT USE OF INSULIN (H): Primary | ICD-10-CM

## 2018-12-12 LAB — HBA1C MFR BLD: 7.6 % (ref 0–5.6)

## 2018-12-12 PROCEDURE — 36415 COLL VENOUS BLD VENIPUNCTURE: CPT | Performed by: PHYSICIAN ASSISTANT

## 2018-12-12 PROCEDURE — 83036 HEMOGLOBIN GLYCOSYLATED A1C: CPT | Performed by: PHYSICIAN ASSISTANT

## 2018-12-12 PROCEDURE — 80053 COMPREHEN METABOLIC PANEL: CPT | Performed by: PHYSICIAN ASSISTANT

## 2018-12-12 NOTE — LETTER
December 13, 2018      Brenda Renee  [address] [place] [address]        Dear ,    We are writing to inform you of your test results.    - Your A1c looks great at 7.6.  - Your metabolic panel is normal except for slightly elevated liver function tests.  They are not so high to be a cause for concern.  Resulted Orders   Hemoglobin A1c   Result Value Ref Range    Hemoglobin A1C 7.6 (H) 0 - 5.6 %   Comprehensive metabolic panel   Result Value Ref Range    Sodium 136 133 - 144 mmol/L    Potassium 4.0 3.4 - 5.3 mmol/L    Chloride 103 94 - 109 mmol/L    Carbon Dioxide 23 20 - 32 mmol/L    Anion Gap 10 3 - 14 mmol/L    Glucose 159 (H) 70 - 99 mg/dL    Urea Nitrogen 13 7 - 30 mg/dL    Creatinine 0.82 0.52 - 1.04 mg/dL    GFR Estimate 74 >60 mL/min/1.7m2    Calcium 8.6 8.5 - 10.1 mg/dL    Bilirubin Total 0.5 0.2 - 1.3 mg/dL    Albumin 3.5 3.4 - 5.0 g/dL    Protein Total 7.9 6.8 - 8.8 g/dL    Alkaline Phosphatase 95 40 - 150 U/L    ALT 63 (H) 0 - 50 U/L    AST 55 (H) 0 - 45 U/L     If you have any questions or concerns, please call the clinic at the number listed above.     Sincerely,    Julienne Bajwa PA-C

## 2018-12-13 LAB
ALBUMIN SERPL-MCNC: 3.5 G/DL (ref 3.4–5)
ALP SERPL-CCNC: 95 U/L (ref 40–150)
ALT SERPL W P-5'-P-CCNC: 63 U/L (ref 0–50)
ANION GAP SERPL CALCULATED.3IONS-SCNC: 10 MMOL/L (ref 3–14)
AST SERPL W P-5'-P-CCNC: 55 U/L (ref 0–45)
BILIRUB SERPL-MCNC: 0.5 MG/DL (ref 0.2–1.3)
BUN SERPL-MCNC: 13 MG/DL (ref 7–30)
CALCIUM SERPL-MCNC: 8.6 MG/DL (ref 8.5–10.1)
CHLORIDE SERPL-SCNC: 103 MMOL/L (ref 94–109)
CO2 SERPL-SCNC: 23 MMOL/L (ref 20–32)
CREAT SERPL-MCNC: 0.82 MG/DL (ref 0.52–1.04)
GFR SERPL CREATININE-BSD FRML MDRD: 74 ML/MIN/1.7M2
GLUCOSE SERPL-MCNC: 159 MG/DL (ref 70–99)
POTASSIUM SERPL-SCNC: 4 MMOL/L (ref 3.4–5.3)
PROT SERPL-MCNC: 7.9 G/DL (ref 6.8–8.8)
SODIUM SERPL-SCNC: 136 MMOL/L (ref 133–144)

## 2018-12-14 ENCOUNTER — OFFICE VISIT (OUTPATIENT)
Dept: FAMILY MEDICINE | Facility: CLINIC | Age: 48
End: 2018-12-14
Payer: COMMERCIAL

## 2018-12-14 VITALS
BODY MASS INDEX: 40.02 KG/M2 | RESPIRATION RATE: 16 BRPM | WEIGHT: 235 LBS | DIASTOLIC BLOOD PRESSURE: 86 MMHG | OXYGEN SATURATION: 97 % | TEMPERATURE: 98.3 F | SYSTOLIC BLOOD PRESSURE: 130 MMHG | HEART RATE: 90 BPM

## 2018-12-14 DIAGNOSIS — R79.89 ELEVATED LFTS: ICD-10-CM

## 2018-12-14 DIAGNOSIS — N60.19 FIBROCYSTIC BREAST DISEASE (FCBD), UNSPECIFIED LATERALITY: ICD-10-CM

## 2018-12-14 DIAGNOSIS — M79.18 MYOFASCIAL PAIN: Chronic | ICD-10-CM

## 2018-12-14 DIAGNOSIS — E11.65 TYPE 2 DIABETES MELLITUS WITH HYPERGLYCEMIA, WITHOUT LONG-TERM CURRENT USE OF INSULIN (H): Primary | ICD-10-CM

## 2018-12-14 DIAGNOSIS — M54.2 NECK PAIN: Chronic | ICD-10-CM

## 2018-12-14 PROCEDURE — 99214 OFFICE O/P EST MOD 30 MIN: CPT | Performed by: PHYSICIAN ASSISTANT

## 2018-12-14 RX ORDER — HYDROCODONE BITARTRATE AND ACETAMINOPHEN 5; 325 MG/1; MG/1
1 TABLET ORAL EVERY 6 HOURS PRN
Qty: 30 TABLET | Refills: 0 | Status: SHIPPED | OUTPATIENT
Start: 2018-12-14 | End: 2019-03-13

## 2018-12-14 NOTE — PROGRESS NOTES
SUBJECTIVE:   Brenda Renee is a 48 year old female who presents to clinic today for the following health issues:    Diabetes Follow-up      Patient is checking blood sugars: checks this all the time with her peggy but also does finger sticks still every once in a while. Patient states that when she was vomiting so hard, she inured her neck and would like a refill on her norco    Diabetic concerns: None     Symptoms of hypoglycemia (low blood sugar): none     Paresthesias (numbness or burning in feet) or sores: yes     Date of last diabetic eye exam: 08/2018    BP Readings from Last 2 Encounters:   12/14/18 130/86   10/10/18 124/66     Hemoglobin A1C (%)   Date Value   12/12/2018 7.6 (H)   10/10/2018 11.2 (H)     LDL Cholesterol Calculated (mg/dL)   Date Value   08/31/2018 88   07/05/2017 91       Diabetes Management Resources    Amount of exercise or physical activity: 4-5 days/week for an average of 15-30 minutes    Problems taking medications regularly: No    Medication side effects: still becoming violently ill from the metformin, diarrhea, vomiting, upset stomach    Diet: regular (no restrictions)  Reviewed and updated as needed this visit by clinical staff  Tobacco  Allergies  Meds  Problems  Med Hx  Surg Hx  Fam Hx  Soc Hx        Reviewed and updated as needed this visit by Provider  Tobacco  Allergies  Meds  Problems  Med Hx  Surg Hx  Fam Hx  Soc Hx        Additional complaints: None    HPI additional notes: Brenda presents today with   Chief Complaint   Patient presents with     Diabetes   Having side effects from metformin.         ROS:  C: Negative for fever, chills, recent change in weight  Skin: Negative for worrisome rashes or lesions  ENT: Negative for ear, mouth and throat problems  Resp: Negative for significant cough or SOB  CV: Negative for chest pain or peripheral edema  GI: Negative for nausea, abdominal pain, heartburn, or change in bowel habits  MS: Negative for  significant arthralgias or myalgias  P: Negative for changes in mood or affect  ROS all other systems negative.    Chart Review:  History   Smoking Status     Never Smoker   Smokeless Tobacco     Never Used     Recent Labs   Lab Test 12/12/18  1558 12/12/18  1111 10/10/18  1145 08/31/18  0838 07/05/17  1032 08/31/16  0841  05/15/15  0838   A1C  --  7.6* 11.2* 14.0* 7.0* 6.2*   < >  --    LDL  --   --   --  88 91 86  --  80   HDL  --   --   --  48* 42* 48*  --  53   TRIG  --   --   --  134 110 52  --  48   ALT 63*  --   --  123* 64*  --   --  28   CR 0.82  --   --  0.70 0.85 0.97  --  1.00   GFRESTIMATED 74  --   --  89 71 62  --  60*   GFRESTBLACK 89  --   --  >90 86 75  --  72   POTASSIUM 4.0  --   --  4.1 4.1  --   --  3.6   TSH  --   --   --  2.26  --   --   --  1.61    < > = values in this interval not displayed.      BP Readings from Last 3 Encounters:   12/14/18 130/86   10/10/18 124/66   08/31/18 138/74    Wt Readings from Last 3 Encounters:   12/14/18 106.6 kg (235 lb)   11/20/18 105.7 kg (233 lb)   10/12/18 104.8 kg (231 lb)                  Patient Active Problem List   Diagnosis     Plantar fascial fibromatosis     Rectal tear     Neck pain     Myofascial pain     Microhematuria     Morbid obesity due to excess calories (H)     Dysmenorrhea     Perimenopausal     Essential hypertension with goal blood pressure less than 140/90     Bilateral low back pain without sciatica, unspecified chronicity     Chronic pain syndrome     Contact sensitivity to metal, current reaction     Bilateral hand numbness     Seasonal allergic rhinitis, unspecified allergic rhinitis trigger     Bilateral thumb pain     Type 2 diabetes mellitus with hyperglycemia, without long-term current use of insulin (H)     Elevated LFTs     Past Surgical History:   Procedure Laterality Date     BREAST BIOPSY, RT/LT  11/10/2008 [facility] - negative, 05/26/2010 [facility] benign    right x 2 benign     COLONOSCOPY  1/7/2013    Procedure:  COLONOSCOPY;  Colonoscopy;  Surgeon: Veronica Meyers MD;  Location:  GI     HC TOOTH EXTRACTION W/FORCEP  1999    wisdom teeth     TONSILLECTOMY  1999     Problem list, Medication list, Allergies, Medical/Social/Surg hx reviewed in [facility], updated as appropriate.   OBJECTIVE:                                                    /86   Pulse 90   Temp 98.3  F (36.8  C) (Tympanic)   Resp 16   Wt 106.6 kg (235 lb)   SpO2 97%   BMI 40.02 kg/m    Body mass index is 40.02 kg/m .  GENERAL: healthy, alert, in no acute distress  EYES: Grossly normal to inspection, EOMI, PERRL  HENT: Mucous mebranes moist.  NECK: Non-tender, no adenopathy.  RESP: lungs clear to auscultation - no rales, no rhonchi, no wheezes  CV: regular rate and rhythm, normal S1 S2. No peripheral edema.  SKIN: no suspicious lesions, no rashes  PSYCH: Alert and oriented times 3;  Able to articulate logical thoughts. Affect is normal.    Diagnostic test results:   Results for orders placed or performed in visit on 12/12/18   Hemoglobin A1c   Result Value Ref Range    Hemoglobin A1C 7.6 (H) 0 - 5.6 %   Comprehensive metabolic panel   Result Value Ref Range    Sodium 136 133 - 144 mmol/L    Potassium 4.0 3.4 - 5.3 mmol/L    Chloride 103 94 - 109 mmol/L    Carbon Dioxide 23 20 - 32 mmol/L    Anion Gap 10 3 - 14 mmol/L    Glucose 159 (H) 70 - 99 mg/dL    Urea Nitrogen 13 7 - 30 mg/dL    Creatinine 0.82 0.52 - 1.04 mg/dL    GFR Estimate 74 >60 mL/min/1.7m2    GFR Estimate If Black 89 >60 mL/min/1.7m2    Calcium 8.6 8.5 - 10.1 mg/dL    Bilirubin Total 0.5 0.2 - 1.3 mg/dL    Albumin 3.5 3.4 - 5.0 g/dL    Protein Total 7.9 6.8 - 8.8 g/dL    Alkaline Phosphatase 95 40 - 150 U/L    ALT 63 (H) 0 - 50 U/L    AST 55 (H) 0 - 45 U/L        ASSESSMENT/PLAN:                                                          ICD-10-CM    1. Type 2 diabetes mellitus with hyperglycemia, without long-term current use of insulin (H) E11.65 Hemoglobin A1c   2. Elevated LFTs  R94.5    3. Neck pain M54.2 HYDROcodone-acetaminophen (NORCO) 5-325 MG tablet   4. Myofascial pain M79.18 HYDROcodone-acetaminophen (NORCO) 5-325 MG tablet   5. Fibrocystic breast disease (FCBD), unspecified laterality N60.19 MA Screen Bilateral w/Nick     A1c significantly improved since being on metformin but pt continues to have side effects.  She will try it a little longer and if she is unable to take it, we will consider changing her to invokana if it is covered by her insurance.  LFTs have also improved with her blood sugar.    Pain medications refilled, last refilled in August.    Gets mammograms at [facility], will fax over order requesting 3D exam as pt has fibrocystic breasts.  Will get A1c prior to recheck in 3 months.    Please see patient instructions for treatment details.    Return in about 3 months (around 3/14/2019) for Diabetes Follow Up.    Chasidy Bajwa PA-C  [facility]

## 2018-12-17 ENCOUNTER — ALLIED HEALTH/NURSE VISIT (OUTPATIENT)
Dept: EDUCATION SERVICES | Facility: CLINIC | Age: 48
End: 2018-12-17
Attending: NUTRITIONIST
Payer: COMMERCIAL

## 2018-12-17 DIAGNOSIS — E11.9 TYPE 2 DIABETES MELLITUS (H): Primary | ICD-10-CM

## 2018-12-17 PROCEDURE — G0108 DIAB MANAGE TRN  PER INDIV: HCPCS | Performed by: NUTRITIONIST

## 2018-12-17 NOTE — PROGRESS NOTES
"Diabetes Self-Management Education & Support    Diabetes Education Self Management & Training    SUBJECTIVE/OBJECTIVE:     Cultural Influences/Ethnic Background:  American    Diabetes Symptoms & Complications    Patient Problem List and Family Medical History reviewed for relevant medical history, current medical status, and diabetes risk factors.    Vitals:  There were no vitals taken for this visit.  Estimated body mass index is 40.02 kg/m  as calculated from the following:    Height as of 8/31/18: 1.632 m (5' 4.25\").    Weight as of 12/14/18: 106.6 kg (235 lb).   Last 3 BP:   BP Readings from Last 3 Encounters:   12/14/18 130/86   10/10/18 124/66   08/31/18 138/74       History   Smoking Status     Never Smoker   Smokeless Tobacco     Never Used       Labs:  Lab Results   Component Value Date    A1C 7.6 12/12/2018     Lab Results   Component Value Date     12/12/2018     Lab Results   Component Value Date    LDL 88 08/31/2018     HDL Cholesterol   Date Value Ref Range Status   08/31/2018 48 (L) >49 mg/dL Final   ]  GFR Estimate   Date Value Ref Range Status   12/12/2018 74 >60 mL/min/1.7m2 Final     Comment:     Non  GFR Calc     GFR Estimate If Black   Date Value Ref Range Status   12/12/2018 89 >60 mL/min/1.7m2 Final     Comment:      GFR Calc     Lab Results   Component Value Date    CR 0.82 12/12/2018     No results found for: MICROALBUMIN    Healthy Eating  Recall obtained from [facility]. Patient notes that she has been \"manipulating her glucose to eat sweets\" - meaning she is watching her glucose and will eat sweets whenever it is well controlled but states she knows she doesn't really need these foods. Her goal is to stop doing that and to reduce the candy. She relates eating candy with stress as well. Lost her dog recently and that was difficult. Her appetite is decreased due to the loss.   Patient's other goal is to decrease bedtime snacking. She is watching " her portions and trying to make healthier food choices.   Patient reports that DE sessions have helped her to feel more empowered to manage her glucose and make lifestyle changes.     Being Active  Walking the dog daily    Monitoring  [facility] reports:            There is a rise in patients glucose trend on the days she was experiencing back and neck pain after throwing her back out while vomiting. (December 8 and 9).     Taking Medications  Diabetes Medication(s)     Biguanides Sig    metFORMIN (GLUCOPHAGE-XR) 500 MG 24 hr tablet Take 2 tablets (1,000 mg) by mouth 2 times daily (with meals)        Patient is having upset stomach and occasional vomiting/diarrhea. It is unclear whether or not this is related to the metformin. She did discuss with her PCP and plans to stay on metformin for now. In addition we did review other diabetes medications today, function of those medications and contraindications.        Problem Solving  [facility] report shows one low glucose event (47 mg/dL) patient reports that she was tired but otherwise did not feel any symptoms. She did not double check the glucose on her meter because her lancing device broke. Patient was given a new lancing device today and I recommended that she double check any low glucose readings with her meter from now on. She did have a glucose reading of 68 and 65 on her sensor while in my office today. This was double checked with a meter and the meter reading was 113 mg/dL, patient was not experiencing any symptoms. She does not have her meter with her today but will bring it to follow up so we can download it along with the [facility] sensor.   Hypoglycemia causes, symptoms and treatment were reviewed with patient today.      Healthy Coping     Patient Activation Measure Survey Score:  RUPERTO Score (Last Two) 8/31/2016   RUPERTO Raw Score 32   Activation Score 62.6   RUPERTO Level 3       ASSESSMENT:    Patient's most recent   Lab Results   Component Value Date    A1C 7.6 12/12/2018     is not meeting goal of <7.0  But is greatly improved from A1C at diagnosis.     INTERVENTION:     Education provided today on:  AADE Self-Care Behaviors:  Healthy Eating: diet modification and meal planning  Problem Solving: low blood glucose - causes, signs/symptoms, treatment and prevention and when to call health care provider    Opportunities for ongoing education and support in diabetes-self management were discussed.    Pt verbalized understanding of concepts discussed and recommendations provided today.       PLAN:  See Patient Instructions for co-developed, patient-stated behavior change goals.  AVS printed and provided to patient today. See Follow-Up section for recommended follow-up.  Bring meter to follow up.  Decrease bedtime snacks and candy.   Double check any low glucose readings on your sensor with your glucose meter.   Time Spent: 60 minutes  Encounter Type: Individual    Any diabetes medication dose changes were made via the CDE Protocol and Collaborative Practice Agreement with the patient's referring provider. A copy of this encounter was shared with the provider.

## 2018-12-21 ENCOUNTER — TRANSFERRED RECORDS (OUTPATIENT)
Dept: HEALTH INFORMATION MANAGEMENT | Facility: CLINIC | Age: 48
End: 2018-12-21

## 2018-12-28 ENCOUNTER — TRANSFERRED RECORDS (OUTPATIENT)
Dept: HEALTH INFORMATION MANAGEMENT | Facility: CLINIC | Age: 48
End: 2018-12-28

## 2018-12-31 ENCOUNTER — TRANSFERRED RECORDS (OUTPATIENT)
Dept: HEALTH INFORMATION MANAGEMENT | Facility: CLINIC | Age: 48
End: 2018-12-31

## 2018-12-31 LAB
HPV ABSTRACT: NORMAL
PAP SMEAR - HIM PATIENT REPORTED: NEGATIVE

## 2019-01-16 ENCOUNTER — ALLIED HEALTH/NURSE VISIT (OUTPATIENT)
Dept: EDUCATION SERVICES | Facility: CLINIC | Age: 49
End: 2019-01-16
Payer: COMMERCIAL

## 2019-01-16 DIAGNOSIS — E11.65 TYPE 2 DIABETES MELLITUS WITH HYPERGLYCEMIA, WITHOUT LONG-TERM CURRENT USE OF INSULIN (H): Primary | ICD-10-CM

## 2019-01-16 NOTE — PATIENT INSTRUCTIONS
Follow up on 3/27 at 10:30am.  Work hard on making better choices for food in the moment and also to plan ahead and bring healthy snacks along with you when you are out.   Your weight goal is under 230 lbs.

## 2019-01-16 NOTE — PROGRESS NOTES
"Diabetes Self-Management Education & Support    Diabetes Education Self Management & Training    SUBJECTIVE/OBJECTIVE:  Presents for: Follow-up  Accompanied by: Self  Diabetes education in the past 24mo: Yes  Focus of Visit: Monitoring, Healthy Eating, Being Active  Diabetes type: Type 2  Disease course: Improving  Transportation concerns: No  Other concerns:: None  Cultural Influences/Ethnic Background:  American    Diabetes Symptoms & Complications          Patient Problem List and Family Medical History reviewed for relevant medical history, current medical status, and diabetes risk factors.    Vitals:  There were no vitals taken for this visit.  Estimated body mass index is 40.02 kg/m  as calculated from the following:    Height as of 8/31/18: 1.632 m (5' 4.25\").    Weight as of 12/14/18: 106.6 kg (235 lb).   Last 3 BP:   BP Readings from Last 3 Encounters:   12/14/18 130/86   10/10/18 124/66   08/31/18 138/74       History   Smoking Status     Never Smoker   Smokeless Tobacco     Never Used       Labs:  Lab Results   Component Value Date    A1C 7.6 12/12/2018     Lab Results   Component Value Date     12/12/2018     Lab Results   Component Value Date    LDL 88 08/31/2018     HDL Cholesterol   Date Value Ref Range Status   08/31/2018 48 (L) >49 mg/dL Final   ]  GFR Estimate   Date Value Ref Range Status   12/12/2018 74 >60 mL/min/1.7m2 Final     Comment:     Non  GFR Calc     GFR Estimate If Black   Date Value Ref Range Status   12/12/2018 89 >60 mL/min/1.7m2 Final     Comment:      GFR Calc     Lab Results   Component Value Date    CR 0.82 12/12/2018     No results found for: MICROALBUMIN    Healthy Eating  Healthy Eating Assessed Today: Yes  Cultural/Yarsanism diet restrictions?: No  Patient on a regular basis: Snacks frequently throughout the day  Meal planning: Smaller portions  Has patient met with a dietitian in the past?: Yes  Weight today is down two pounds - 233 " "lbs  Patient reports she is stressed due to getting ready to sell her aunts home as well as helping to care for her elderly father. She has been snacking more than having meals.  Some common snacks include baked pasta, egg salad sandwich, toast with PB and a lot sugar jam, dole fruit, an orange, chicken taco, nut butter packet, yogurt with grape nuts.  She did see a rise in her glucose after eating brunch at a buffet this past weekend. Reports larger portions and some pastry.     Being Active  Being Active Assessed Today: No  Walking her dog and packing at her aunts home     Monitoring  Monitoring Assessed Today: Yes  Did patient bring glucose meter to appointment? : Yes  [place]incoln downloaded today. Average glucose 142 with standard deviation 44.3.   In target range 78% of the time    Taking Medications  Diabetes Medication(s)     Biguanides Sig    metFORMIN (GLUCOPHAGE-XR) 500 MG 24 hr tablet Take 2 tablets (1,000 mg) by mouth 2 times daily (with meals)          Taking Medication Assessed Today: Yes  Problems taking diabetes medications regularly?: No  Diabetes medication side effects?: No    Problem Solving  Problem Solving Assessed Today: Yes     Healthy Coping  Healthy Coping Assessed Today: Yes  Emotional response to diabetes: Ready to learn  Stage of change: ACTION (Actively working towards change)  Patient Activation Measure Survey Score:  RUPERTO Score (Last Two) 8/31/2016   RUPERTO Raw Score 32   Activation Score 62.6   RUPERTO Level 3       ASSESSMENT:  Brenda continues to work on making lifestyle changes. She reports that she is trying to make more sensible decisions regarding food and to pack food with her when she knows she will be out for many hours to avoid going to fast food. When she does go to fast food she opts for smaller portions. Patient reports that she \"eats emotionally\" but is aware of it and is trying to change this behavior.  States she expects her stress level to decline in the near future and isn't " sure how that will affect her because she has gotten used to functioning under high stress. She is considering going back to therapy, states she found it helpful in the past.     Patient's most recent   Lab Results   Component Value Date    A1C 7.6 12/12/2018    is not meeting goal of <7.0    INTERVENTION:   Diabetes knowledge and skills assessment:     Based on learning assessment above, most appropriate setting for further diabetes education would be: Individual setting.    Education provided today on:  AADE Self-Care Behaviors:  Reviewed precautions with the peggy sensor - vitamin C and aspirin as well as when to double check the reading with a glucose meter.  Reviewed diet modification and meal planning and some healthy ideas for meals and snacks.  Encouraged increased physical activity through daily activities, such as taking the stairs instead of elevator.     Opportunities for ongoing education and support in diabetes-self management were discussed.    Pt verbalized understanding of concepts discussed and recommendations provided today.       Education Materials Provided:  No new materials provided today    PLAN:  See Patient Instructions for co-developed, patient-stated behavior change goals.  AVS printed and provided to patient today. See Follow-Up section for recommended follow-up.    Time Spent: 60 minutes  Encounter Type: Individual    Any diabetes medication dose changes were made via the CDE Protocol and Collaborative Practice Agreement with the patient's referring provider. A copy of this encounter was shared with the provider.

## 2019-03-09 DIAGNOSIS — E11.65 TYPE 2 DIABETES MELLITUS WITH HYPERGLYCEMIA, WITHOUT LONG-TERM CURRENT USE OF INSULIN (H): ICD-10-CM

## 2019-03-11 DIAGNOSIS — E11.65 TYPE 2 DIABETES MELLITUS WITH HYPERGLYCEMIA, WITHOUT LONG-TERM CURRENT USE OF INSULIN (H): ICD-10-CM

## 2019-03-11 LAB — HBA1C MFR BLD: 7 % (ref 0–5.6)

## 2019-03-11 PROCEDURE — 36415 COLL VENOUS BLD VENIPUNCTURE: CPT | Performed by: PHYSICIAN ASSISTANT

## 2019-03-11 PROCEDURE — 83036 HEMOGLOBIN GLYCOSYLATED A1C: CPT | Performed by: PHYSICIAN ASSISTANT

## 2019-03-11 RX ORDER — METFORMIN HCL 500 MG
1000 TABLET, EXTENDED RELEASE 24 HR ORAL 2 TIMES DAILY WITH MEALS
Qty: 360 TABLET | Refills: 0 | Status: SHIPPED | OUTPATIENT
Start: 2019-03-11 | End: 2019-03-13

## 2019-03-11 NOTE — TELEPHONE ENCOUNTER
Requested Prescriptions   Pending Prescriptions Disp Refills     metFORMIN (GLUCOPHAGE-XR) 500 MG 24 hr tablet [Pharmacy Med Name: METFORMIN  MG TABLET]  Last Written Prescription Date:  9/4/2018  Last Fill Quantity: 180,  # refills: 3   Last office visit: 12/14/2018 with prescribing provider:  Julienne   Future Office Visit:   Next 5 appointments (look out 90 days)    Mar 13, 2019 10:30 AM CDT  SHORT with Chasidy Bajwa PA-C  [facility] ([facility]) [address] [place] [id number]  [phone]          180 tablet 3     Sig: TAKE 2 TABLETS (1,000 MG) BY MOUTH 2 TIMES DAILY (WITH MEALS)    Biguanide Agents Passed - 3/9/2019  3:52 PM       Passed - Blood pressure less than 140/90 in past 6 months    BP Readings from Last 3 Encounters:   12/14/18 130/86   10/10/18 124/66   08/31/18 138/74                Passed - Patient has documented LDL within the past 12 mos.    Recent Labs   Lab Test 08/31/18  0838   LDL 88            Passed - Patient has had a Microalbumin in the past 15 mos.    Recent Labs   Lab Test 08/31/18  0838   MICROL 17   UMALCR 21.09            Passed - Patient is age 10 or older       Passed - Patient has documented A1c within the specified period of time.    If HgbA1C is 8 or greater, it needs to be on file within the past 3 months.  If less than 8, must be on file within the past 6 months.     Recent Labs   Lab Test 12/12/18  1111   A1C 7.6*            Passed - Patient's CR is NOT>1.4 OR Patient's EGFR is NOT<45 within past 12 mos.    Recent Labs   Lab Test 12/12/18  1558   GFRESTIMATED 74   GFRESTBLACK 89       Recent Labs   Lab Test 12/12/18  1558   CR 0.82            Passed - Patient does NOT have a diagnosis of CHF.       Passed - Medication is active on med list       Passed - Patient is not pregnant       Passed - Patient has not had a positive pregnancy test within the past 12 mos.      "   Passed - Recent (6 mo) or future (30 days) visit within the authorizing provider's specialty    Patient had office visit in the last 6 months or has a visit in the next 30 days with authorizing provider or within the authorizing provider's specialty.  See \"Patient Info\" tab in inbasket, or \"Choose Columns\" in Meds & Orders section of the refill encounter.              "

## 2019-03-13 ENCOUNTER — OFFICE VISIT (OUTPATIENT)
Dept: FAMILY MEDICINE | Facility: CLINIC | Age: 49
End: 2019-03-13
Payer: COMMERCIAL

## 2019-03-13 VITALS
WEIGHT: 240 LBS | SYSTOLIC BLOOD PRESSURE: 128 MMHG | RESPIRATION RATE: 20 BRPM | TEMPERATURE: 99.3 F | HEART RATE: 76 BPM | OXYGEN SATURATION: 98 % | BODY MASS INDEX: 40.88 KG/M2 | DIASTOLIC BLOOD PRESSURE: 82 MMHG

## 2019-03-13 DIAGNOSIS — R41.3 IMPAIRED MEMORY: ICD-10-CM

## 2019-03-13 DIAGNOSIS — J01.00 ACUTE NON-RECURRENT MAXILLARY SINUSITIS: ICD-10-CM

## 2019-03-13 DIAGNOSIS — Z23 NEED FOR VACCINATION: ICD-10-CM

## 2019-03-13 DIAGNOSIS — R00.2 PALPITATIONS: ICD-10-CM

## 2019-03-13 DIAGNOSIS — L65.9 HAIR THINNING: ICD-10-CM

## 2019-03-13 DIAGNOSIS — M79.18 MYOFASCIAL PAIN: Chronic | ICD-10-CM

## 2019-03-13 DIAGNOSIS — E11.65 TYPE 2 DIABETES MELLITUS WITH HYPERGLYCEMIA, WITHOUT LONG-TERM CURRENT USE OF INSULIN (H): Primary | ICD-10-CM

## 2019-03-13 DIAGNOSIS — M54.2 NECK PAIN: Chronic | ICD-10-CM

## 2019-03-13 DIAGNOSIS — Z12.11 SCREENING FOR COLON CANCER: ICD-10-CM

## 2019-03-13 PROCEDURE — 90471 IMMUNIZATION ADMIN: CPT | Performed by: PHYSICIAN ASSISTANT

## 2019-03-13 PROCEDURE — 90750 HZV VACC RECOMBINANT IM: CPT | Performed by: PHYSICIAN ASSISTANT

## 2019-03-13 PROCEDURE — 99215 OFFICE O/P EST HI 40 MIN: CPT | Mod: 25 | Performed by: PHYSICIAN ASSISTANT

## 2019-03-13 RX ORDER — LANOLIN ALCOHOL/MO/W.PET/CERES
400 CREAM (GRAM) TOPICAL DAILY
Qty: 90 TABLET | Refills: 1 | Status: SHIPPED | OUTPATIENT
Start: 2019-03-13 | End: 2021-04-26

## 2019-03-13 RX ORDER — AMOXICILLIN 500 MG/1
500 CAPSULE ORAL 3 TIMES DAILY
Qty: 21 CAPSULE | Refills: 0 | Status: SHIPPED | OUTPATIENT
Start: 2019-03-13 | End: 2019-06-12

## 2019-03-13 RX ORDER — METFORMIN HCL 500 MG
1000 TABLET, EXTENDED RELEASE 24 HR ORAL 2 TIMES DAILY WITH MEALS
Qty: 360 TABLET | Refills: 1 | Status: SHIPPED | OUTPATIENT
Start: 2019-03-13 | End: 2019-09-11

## 2019-03-13 RX ORDER — HYDROCODONE BITARTRATE AND ACETAMINOPHEN 5; 325 MG/1; MG/1
1 TABLET ORAL EVERY 6 HOURS PRN
Qty: 30 TABLET | Refills: 0 | Status: SHIPPED | OUTPATIENT
Start: 2019-03-13 | End: 2019-06-12

## 2019-03-13 NOTE — PROGRESS NOTES
SUBJECTIVE:   Brenda Renee is a 49 year old female who presents to clinic today for the following health issues:    Lab work done 3/11/19.    Additional: referral to Cardiology. C/o irregular heartbeats. Family hx of heart disease. 2. Referral for [facility]. 3. Ears plugged, not feeling well.    Concerned about RA  Concerned about memory loss    Diabetes Follow-up      Patient is checking blood sugars: not at all. Lost meter. Does have free style Lincoln.    Diabetic concerns: None     Symptoms of hypoglycemia (low blood sugar): none     Paresthesias (numbness or burning in feet) or sores: Yes      Date of last diabetic eye exam: Aug 2018    BP Readings from Last 2 Encounters:   03/13/19 128/82   12/14/18 130/86     Hemoglobin A1C (%)   Date Value   03/11/2019 7.0 (H)   12/12/2018 7.6 (H)     LDL Cholesterol Calculated (mg/dL)   Date Value   08/31/2018 88   07/05/2017 91       Diabetes Management Resources    Reviewed and updated as needed this visit by clinical staff  Tobacco  Allergies  Meds  Problems  Med Hx  Surg Hx  Fam Hx  Soc Hx        Reviewed and updated as needed this visit by Provider  Tobacco  Allergies  Meds  Problems  Med Hx  Surg Hx  Fam Hx  Soc Hx        Additional complaints: RESPIRATORY SYMPTOMS      Duration:  1 week    Description  nasal congestion, sore throat, facial pain/pressure, cough, fever and ear pain     Severity: moderate    Accompanying signs and symptoms: None    History (predisposing factors):  none    Precipitating or alleviating factors: None    Therapies tried and outcome:  none      HPI additional notes: Brenda presents today with   Chief Complaint   Patient presents with     Recheck Medication     Still has diarrhea with  metformin but would like to stay on it.    Having discharge in sinuses and ear pressure, fever over the weekend.  Having body aches but unsure if is due to illness or more physical activity.    Having  increased episodes of palpitations.  would like to see cardiology.    Due for colonoscopy.      Having pain in fingers and toes, thinks she might have seronegative RA, she has a family member with this.  Also has short pains in the heal of her foot that causes a deep pain in her calf.         ROS:  C: Negative for fever, chills, recent change in weight  Skin: Negative for worrisome rashes or lesions  ENT/MOUTH:POSITIVE for congestion, runny nose, ear pain, post-nasal drainage and sinus pressure.  Negative for tinnitus and vertigo.  Resp: POSITIVE for cough occasionally productive without  SOB and wheezing  CV: POSITIVE for palpitations.  Negative for chest pain and dyspnea on exertion  GI:diarrhea  MS: POSITIVE for generalized fatigue and malaise.  PSYCHIATRIC: POSITIVE for agitation, concentration difficulty or anxiety. Negative for thoughts of self harm or suicide  ROS all other systems negative.    Chart Review:  History   Smoking Status     Never Smoker   Smokeless Tobacco     Never Used     Recent Labs   Lab Test 03/11/19  1032 12/12/18  1558 12/12/18  1111 10/10/18  1145 08/31/18  0838 07/05/17  1032 08/31/16  0841  05/15/15  0838   A1C 7.0*  --  7.6* 11.2* 14.0* 7.0* 6.2*   < >  --    LDL  --   --   --   --  88 91 86  --  80   HDL  --   --   --   --  48* 42* 48*  --  53   TRIG  --   --   --   --  134 110 52  --  48   ALT  --  63*  --   --  123* 64*  --   --  28   CR  --  0.82  --   --  0.70 0.85 0.97  --  1.00   GFRESTIMATED  --  74  --   --  89 71 62  --  60*   GFRESTBLACK  --  89  --   --  >90 86 75  --  72   POTASSIUM  --  4.0  --   --  4.1 4.1  --   --  3.6   TSH  --   --   --   --  2.26  --   --   --  1.61    < > = values in this interval not displayed.      BP Readings from Last 3 Encounters:   03/13/19 128/82   12/14/18 130/86   10/10/18 124/66    Wt Readings from Last 3 Encounters:   03/13/19 108.9 kg (240 lb)   12/14/18 106.6 kg (235 lb)   11/20/18 105.7 kg (233 lb)                  Patient Active  Problem List   Diagnosis     Plantar fascial fibromatosis     Rectal tear     Neck pain     Myofascial pain     Microhematuria     Morbid obesity due to excess calories (H)     Dysmenorrhea     Perimenopausal     Essential hypertension with goal blood pressure less than 140/90     Bilateral low back pain without sciatica, unspecified chronicity     Chronic pain syndrome     Contact sensitivity to metal, current reaction     Bilateral hand numbness     Seasonal allergic rhinitis, unspecified allergic rhinitis trigger     Bilateral thumb pain     Type 2 diabetes mellitus with hyperglycemia, without long-term current use of insulin (H)     Elevated LFTs     Impaired memory     Past Surgical History:   Procedure Laterality Date     BREAST BIOPSY, RT/LT  11/10/2008 [facility] - negative, 05/26/2010 [facility] benign    right x 2 benign     COLONOSCOPY  1/7/2013    Procedure: COLONOSCOPY;  Colonoscopy;  Surgeon: Veronica Meyers MD;  Location:  GI     HC TOOTH EXTRACTION W/FORCEP  1999    wisdom teeth     TONSILLECTOMY  1999     Problem list, Medication list, Allergies, Medical/Social/Surg hx reviewed in [facility], updated as appropriate.   OBJECTIVE:                                                    /82 (BP Location: Left arm, Patient Position: Sitting, Cuff Size: Adult Large)   Pulse 76   Temp 99.3  F (37.4  C) (Temporal)   Resp 20   Wt 108.9 kg (240 lb)   SpO2 98%   BMI 40.88 kg/m    Body mass index is 40.88 kg/m .  GENERAL: healthy, alert, in no acute distress  EYES: Grossly normal to inspection, EOMI, PERRL  HENT: Ear canals normal bilaterally. TM dull gray  bulging with serous effusion left. Nasal mucosa mildly edematous with purulent rhinorrhea.  Mouth- mucous membranes moist, no lesions or ulcerations. Pharynx pink. and No tonsillary hypertrophy. Uvula midline, purulent post-nasal drainage.  Maxillary and frontal sinuses tender to palpation bilaterally  NECK: Non-tender, no adenopathy.  RESP:  lungs clear to auscultation - no rales, no rhonchi, no wheezes  CV: regular rate and rhythm, normal S1 S2. No peripheral edema.  SKIN: no suspicious lesions, no rashes  PSYCH:Mental Status Exam  Behavior: agitated  Speech: tangential  Mood: anxious  Affect: Anxious/Nervous  Thought Processes: Circumstantial and Tangential  Thought Content: Preoccupations and Ruminations  Insight: Fair  Judgment: Fair      Diagnostic test results: none      ASSESSMENT/PLAN:                                                          ICD-10-CM    1. Type 2 diabetes mellitus with hyperglycemia, without long-term current use of insulin (H) E11.65 metFORMIN (GLUCOPHAGE-XR) 500 MG 24 hr tablet   2. Palpitations R00.2 CARDIOLOGY EVAL ADULT REFERRAL   3. Neck pain M54.2 HYDROcodone-acetaminophen (NORCO) 5-325 MG tablet   4. Myofascial pain M79.18 HYDROcodone-acetaminophen (NORCO) 5-325 MG tablet   5. Acute non-recurrent maxillary sinusitis J01.00 amoxicillin (AMOXIL) 500 MG capsule   6. Need for vaccination Z23 VACCINE ADMINISTRATION, INITIAL   7. Screening for colon cancer Z12.11 GASTROENTEROLOGY ADULT REF PROCEDURE ONLY Miri Cevallos [phone]; Colorectal Surgery   8. Hair thinning L65.9 folic acid (FOLVITE) 400 MCG tablet   9. Impaired memory R41.3      A1c continues to improve on metformin.  Pt still having some diarrhea but would like to continue with the medication.  May consider switching her to januvia or invokana if it becomes intolerable.    Chronic pain worsening, especially in fingers and toes.  Has trouble with back and neck.  Thinks she might be seronegative RA like one of her relatives.  Needs refill on pain medication, has not been refilled in several months.  She does not use it frequently enough to need a CSA.  She told the MA after she got her shingrix vaccine that she would like a work up for rheumatoid arthritis.  Pt was already tested for all of this in 2016.  Labs were reviewed after the visit was over and all  were normal.  CRP Inflammation   Date Value Ref Range Status   08/31/2018 25.0 (H) 0.0 - 8.0 mg/L Final     Sed Rate   Date Value Ref Range Status   08/31/2016 17 0 - 20 mm/h Final     Rheumatoid factor: 8/31/16 <20  Lyme 7/23/15: negative  Uric acid 1/18/13: 5.0    I think her joint pain is more likely related to her body habitus putting stress on her joints and lack of physical activity.      Will start on antibiotics for sinusitis.  Discussed pushing fluids and using warm compresses.    Referral for cardiology for heart palpitations, normal exam today.  Seem to be stress induced but pt would like evaluation.    Noted thinning hair and her hairdresser recommended folic acid supplement.    Due for colonoscopy, referral provided.    Pt concerned for memory problems, misplaces objects and forgets what she is trying to say.  Do not have time to assess this today but will do a mini mental exam at her next visit.    Please see patient instructions for treatment details.    Return in about 3 months (around 6/13/2019) for Diabetes Follow Up.    Chasidy Bajwa PA-C  [facility]

## 2019-03-13 NOTE — NURSING NOTE
Screening Questionnaire for Adult Immunization    Are you sick today?   No   Do you have allergies to medications, food, a vaccine component or latex?   Yes   Have you ever had a serious reaction after receiving a vaccination?   No   Do you have a long-term health problem with heart disease, lung disease, asthma, kidney disease, metabolic disease (e.g. diabetes), anemia, or other blood disorder?   Yes   Do you have cancer, leukemia, HIV/AIDS, or any other immune system problem?   No   In the past 3 months, have you taken medications that affect  your immune system, such as prednisone, other steroids, or anticancer drugs; drugs for the treatment of rheumatoid arthritis, Crohn s disease, or psoriasis; or have you had radiation treatments?   No   Have you had a seizure, or a brain or other nervous system problem?   No   During the past year, have you received a transfusion of blood or blood     products, or been given immune (gamma) globulin or antiviral drug?   No   For women: Are you pregnant or is there a chance you could become        pregnant during the next month?   No   Have you received any vaccinations in the past 4 weeks?   No     Immunization questionnaire was positive for at least one answer.        Per orders of Julienne Bajwa PA-C, injection of Shingrix given by Princess JYOTI Mccollum. Patient instructed to remain in clinic for 15 minutes afterwards, and to report any adverse reaction to me immediately.       Screening performed by Princess JYOTI Mccollum on 3/13/2019 at 11:28 AM.

## 2019-03-13 NOTE — Clinical Note
After giving her her Shingrix she wanted me to mention to you that she would like to discuss Rheumatoid Arthritis. Says her sister has it and wants to make sure she doesn't.

## 2019-03-14 ENCOUNTER — TELEPHONE (OUTPATIENT)
Dept: FAMILY MEDICINE | Facility: CLINIC | Age: 49
End: 2019-03-14

## 2019-03-14 DIAGNOSIS — G89.4 CHRONIC PAIN SYNDROME: Primary | ICD-10-CM

## 2019-03-14 PROBLEM — R41.3 IMPAIRED MEMORY: Status: ACTIVE | Noted: 2019-03-14

## 2019-03-14 NOTE — PROGRESS NOTES
Please let pt know that we checked all the rheumatoid labs on her in 2016 and they were all normal but I can refer her to a rheumatologist if she would like.

## 2019-03-14 NOTE — TELEPHONE ENCOUNTER
Message from provider:  Please let pt know that we checked all the rheumatoid labs on her in 2016 and they were all normal but I can refer her to a rheumatologist if she would like.      Spoke with patient and she would like to see specialist. Routing to provider for review.

## 2019-03-14 NOTE — TELEPHONE ENCOUNTER
Left voice message asking pt to call clinic back. Reception, please give pt rheumatology phone numbers below.

## 2019-03-14 NOTE — TELEPHONE ENCOUNTER
Can call to schedule:    : ARCENIO:  [facility].  [phone] [email or web address]    ARCENIO:  [facility]   [phone] [email or web address]

## 2019-03-18 NOTE — TELEPHONE ENCOUNTER
Patient Contact    Attempt # 2    Was call answered?  No.  Left message on voicemail with information to call the clinic.     On call back: please give phone numbers to patient as requested by provider.

## 2019-03-19 NOTE — TELEPHONE ENCOUNTER
Talked with patient today.     Provider message shared with patient, phone numbers given. No further follow up needed.

## 2019-03-27 ENCOUNTER — ALLIED HEALTH/NURSE VISIT (OUTPATIENT)
Dept: EDUCATION SERVICES | Facility: CLINIC | Age: 49
End: 2019-03-27
Payer: COMMERCIAL

## 2019-03-27 DIAGNOSIS — E11.65 TYPE 2 DIABETES MELLITUS WITH HYPERGLYCEMIA, WITHOUT LONG-TERM CURRENT USE OF INSULIN (H): Primary | ICD-10-CM

## 2019-03-27 NOTE — PATIENT INSTRUCTIONS
Your calorie needs for weight loss of 1-2 lbs per week are 9714-6452 per day.  If half those calories are coming from carbohydrate you would need 45-60 grams of carbohydrate per meal and 15-30 grams of carbohydrate per snack.   Avoid high calorie/added sugar snacks. Try packing up non starchy vegetables to have as a snack.  Limit sodium to 1500-2300mg per day. Fruit, vegetables, lean proteins, whole grains are naturally low in sodium.   Aim for 30 minutes of activity five days per week or 150 minutes of activity weekly.     Follow up on 4/12 at 10:30 with Josiane Dennis RN CDE.  Follow up with me on July 3rd at 10:30.    Thanks! Have a great trip!

## 2019-03-29 VITALS — BODY MASS INDEX: 40.71 KG/M2 | WEIGHT: 239 LBS

## 2019-03-29 NOTE — PROGRESS NOTES
"Diabetes Self-Management Education & Support    Diabetes Education Self Management & Training    SUBJECTIVE/OBJECTIVE:  Presents for: Follow-up  Accompanied by: Self  Diabetes education in the past 24mo: Yes  Focus of Visit: Assistance w/ making life changes, Self-care behavioral goal setting, Healthy Eating  Diabetes type: Type 2  Disease course: Improving  Diabetes management related comments/concerns: Lost her meter and needs a new one  Other concerns:: Other (anxious, tangential), Lack of coping  Cultural Influences/Ethnic Background:  American    Diabetes Symptoms & Complications  Blurred vision: No  Fatigue: No  Neuropathy: No  Foot ulcerations: No  Polydipsia: No  Polyphagia: No  Polyuria: No  Visual change: No  Weakness: No  Weight loss: No  Slow healing wounds: No  Recent Infection(s): No  Weight trend: Stable       Patient Problem List and Family Medical History reviewed for relevant medical history, current medical status, and diabetes risk factors.    Vitals:  Wt 108.4 kg (239 lb)   BMI 40.71 kg/m    Estimated body mass index is 40.71 kg/m  as calculated from the following:    Height as of 8/31/18: 1.632 m (5' 4.25\").    Weight as of this encounter: 108.4 kg (239 lb).   Last 3 BP:   BP Readings from Last 3 Encounters:   03/13/19 128/82   12/14/18 130/86   10/10/18 124/66       History   Smoking Status     Never Smoker   Smokeless Tobacco     Never Used       Labs:  Lab Results   Component Value Date    A1C 7.0 03/11/2019     Lab Results   Component Value Date     12/12/2018     Lab Results   Component Value Date    LDL 88 08/31/2018     HDL Cholesterol   Date Value Ref Range Status   08/31/2018 48 (L) >49 mg/dL Final   ]  GFR Estimate   Date Value Ref Range Status   12/12/2018 74 >60 mL/min/1.7m2 Final     Comment:     Non  GFR Calc     GFR Estimate If Black   Date Value Ref Range Status   12/12/2018 89 >60 mL/min/1.7m2 Final     Comment:      GFR Calc     Lab " Results   Component Value Date    CR 0.82 12/12/2018     No results found for: MICROALBUMIN    Healthy Eating  Breakfast: Great grains cereal 1/2 -1 cup with 2 tbsp chopped dates or 2 pieces 12 grain toast with 2 tbsp peanutbutter and 1 tsp jam, fresh berries 1 cup or 4-6 ounces juice, 1 cup whole milk (does not want to switch to lower fat milk)  Lunch: [place], 2 servings pretzel rods with goat cheese or a sandwich  Dinner: Eating more convenient and processed foods lately due to time constraint - frozen lasagna or chicken tacos  Other: Trying to increase produce intake. Drinks water and Hint water, mari juice, milk. Limiting red meat and processed/high fat meat and cheese. Cut back on white rice.   Has patient met with a dietitian in the past?: Yes    Being Active  Exercise:: Currently not exercising  States active at work 5 hours three days per week as a  at [employer]. Otherwise packing at her late aunts house. At this time does not feel she can be more active than she currently is.     Monitoring  Monitoring Assessed Today: Yes      Taking Medications  Diabetes Medication(s)     Biguanides       metFORMIN (GLUCOPHAGE-XR) 500 MG 24 hr tablet    Take 2 tablets (1,000 mg) by mouth 2 times daily (with meals)          Current Treatments: Oral Agent (monotherapy)  Problems taking diabetes medications regularly?: No  Treatment Compliance: All of the time    Problem Solving  Problem Solving Assessed Today: Yes  Hypoglycemia Frequency: Rarely  Patient carries a carbohydrate source: Yes    Hypoglycemia Complications  Blackouts: No  Hospitalization: No  Nocturnal hypoglycemia: No  Required assistance: No  Required glucagon injection: No  Seizures: No    Reducing Risks  Reducing Risks Assessed Today: No    Healthy Coping     Patient Activation Measure Survey Score:  RUPERTO Score (Last Two) 8/31/2016   RUPERTO Raw Score 32   Activation Score 62.6   RUPERTO Level 3       ASSESSMENT:  Brenda returns today for follow up. She is  happy with the improvement once again to her most recent A1C. She continues metformin 1000mg BID and is wearing her peggy sensor more regularly. She did lose her glucose meter about a week ago and states this resulted in feelings of anxiety as she wasn't able to double check her sensor readings against the meter readings on a day she felt sick. I provided her with a new meter today and told her that she can always call CDE's and/or her clinic anytime she needs a new meter and we will be happy to place a new script.    Patient expresses that she is active at work, on her feet as a  and lifting boxes. She also continues to pack and work on clearing out her late-aunts home. She does not feel she can increase her activity beyond this currently. Patient expresses some difficulty with managing stress in her life today, I recommended referral to behavioral health/therapy, but patient states she isn't ready and has concerns about cost. She state she will look into a program called [facility] for low cost therapy. She will let her primary care doctor know if she wants a referral. This note was routed to Dr. Bajwa.     Brenda states she is eating more processed foods and doesn't foresee that she will be able to change that anytime soon due to her busy schedule. She is trying to make healthier snack options and brings in some packages for us to review. Some of the snacks are high fat/calorie/sugar, so additional healthier snack options were reviewed today.     Congratulated patient on the improvement to her A1C.     Patient's most recent   Lab Results   Component Value Date    A1C 7.0 03/11/2019    is not meeting goal of <7.0    INTERVENTION:   Diabetes knowledge and skills assessment:     Based on learning assessment above, most appropriate setting for further diabetes education would be: Individual setting.    Education provided today on:  AADE Self-Care Behaviors:  Healthy Eating: consistency in amount,  composition, and timing of food intake, weight reduction, heart healthy diet and label reading  Healthy Coping: benefits of making appropriate lifestyle changes, utilize support systems and when to seek professional counseling    Opportunities for ongoing education and support in diabetes-self management were discussed.    Pt verbalized understanding of concepts discussed and recommendations provided today.       Education Materials Provided:  No new materials provided today    PLAN:  See Patient Instructions for co-developed, patient-stated behavior change goals.  AVS printed and provided to patient today. See Follow-Up section for recommended follow-up.  Follow up with RN NICOLEE next month for review of pathophysiology and reducing risks  Follow up with me in three months.  Time Spent: 60 minutes  Encounter Type: Individual    Any diabetes medication dose changes were made via the CDE Protocol and Collaborative Practice Agreement with the patient's referring provider. A copy of this encounter was shared with the provider.

## 2019-04-19 ENCOUNTER — ALLIED HEALTH/NURSE VISIT (OUTPATIENT)
Dept: EDUCATION SERVICES | Facility: CLINIC | Age: 49
End: 2019-04-19
Payer: COMMERCIAL

## 2019-04-19 DIAGNOSIS — E11.65 TYPE 2 DIABETES MELLITUS WITH HYPERGLYCEMIA, WITHOUT LONG-TERM CURRENT USE OF INSULIN (H): Primary | ICD-10-CM

## 2019-04-19 NOTE — PATIENT INSTRUCTIONS
1.  Scan your blood sugar at least every 8 hours.      2.  Work on reducing the carb in your diet.  Even if you blood sugar is normal.    3.  Keep moving.  Try to set a small exercise goal and work on it.      4.  Return for education session in 4 weeks.    Josiane Dennis RN,CDE  [address] [place] [address]  Phone: [phone]  [email or web address]

## 2019-04-19 NOTE — PROGRESS NOTES
"Diabetes Self-Management Education & Support    Diabetes Education Self Management & Training    SUBJECTIVE/OBJECTIVE:  Presents for: Individual review  Accompanied by: Self  Diabetes education in the past 24mo: Yes  Focus of Visit: Diabetes Pathophysiology  Diabetes type: Type 2  Date of diagnosis: 8/2018  Disease course: Improving  Diabetes management related comments/concerns: my heart, have heart problems in the family, has had some irregular heart beats  Cultural Influences/Ethnic Background:  American      Diabetes Symptoms & Complications   none    Patient Problem List and Family Medical History reviewed for relevant medical history, current medical status, and diabetes risk factors.    Vitals:    Estimated body mass index is 40.71 kg/m  as calculated from the following:    Height as of 8/31/18: 1.632 m (5' 4.25\").    Weight as of 3/29/19: 108.4 kg (239 lb).   Last 3 BP:   BP Readings from Last 3 Encounters:   03/13/19 128/82   12/14/18 130/86   10/10/18 124/66       History   Smoking Status     Never Smoker   Smokeless Tobacco     Never Used       Labs:  Lab Results   Component Value Date    A1C 7.0 03/11/2019     Lab Results   Component Value Date     12/12/2018     Lab Results   Component Value Date    LDL 88 08/31/2018     HDL Cholesterol   Date Value Ref Range Status   08/31/2018 48 (L) >49 mg/dL Final   ]  GFR Estimate   Date Value Ref Range Status   12/12/2018 74 >60 mL/min/1.7m2 Final     Comment:     Non  GFR Calc     GFR Estimate If Black   Date Value Ref Range Status   12/12/2018 89 >60 mL/min/1.7m2 Final     Comment:      GFR Calc     Lab Results   Component Value Date    CR 0.82 12/12/2018     No results found for: MICROALBUMIN    Healthy Eating  Feels like she has been eating more but her blood sugar has stayed in target range    Being Active  Has a treadmill but is not using it    Monitoring  Using a [place], scans frequently, it reads close to her " meter    Taking Medications  Diabetes Medication(s)     Biguanides       metFORMIN (GLUCOPHAGE-XR) 500 MG 24 hr tablet    Take 2 tablets (1,000 mg) by mouth 2 times daily (with meals)         Healthy Coping     Patient Activation Measure Survey Score:  RUPERTO Score (Last Two) 8/31/2016   RUPERTO Raw Score 32   Activation Score 62.6   RUPERTO Level 3       ASSESSMENT:  Type 2 diabetes, working on lifestyle changes    Patient's most recent   Lab Results   Component Value Date    A1C 7.0 03/11/2019    is meeting goal of <7.0    INTERVENTION:     Education provided today on:  Topics that were covered in today's visit:    Basic pathophysiology of T2DM, relationship between carbohydrate intake, release of glucose from the liver, exercise and weight management on glucose control.    Target blood glucose for pre-meal and 2 hour post-prandial glucose    Action, timing and potential side-effects of diabetes medications: Metformin    Basic meal planning using the plate method, emphasizing portion control, healthy food choices and eliminating or minimizing sugared beverages.    Need for consistent physical activity, 30-45 minutes duration, preferably 4-6 days per week.     Opportunities for ongoing education and support in diabetes-self management were discussed.    Pt verbalized understanding of concepts discussed and recommendations provided today.       PLAN:  See Patient Instructions for co-developed, patient-stated behavior change goals.  AVS printed and provided to patient today. See Follow-Up section for recommended follow-up.  Time Spent: 60 minutes  Encounter Type: Individual    Any diabetes medication dose changes were made via the CDE Protocol and Collaborative Practice Agreement with the patient's referring provider. A copy of this encounter was shared with the provider.

## 2019-05-06 DIAGNOSIS — E11.65 TYPE 2 DIABETES MELLITUS WITH HYPERGLYCEMIA, WITHOUT LONG-TERM CURRENT USE OF INSULIN (H): ICD-10-CM

## 2019-05-06 RX ORDER — FLASH GLUCOSE SENSOR
1 KIT MISCELLANEOUS
Qty: 2 EACH | Refills: 11 | Status: SHIPPED | OUTPATIENT
Start: 2019-05-06 | End: 2021-03-03

## 2019-05-09 ENCOUNTER — TELEPHONE (OUTPATIENT)
Dept: EDUCATION SERVICES | Facility: CLINIC | Age: 49
End: 2019-05-09

## 2019-05-17 ENCOUNTER — ALLIED HEALTH/NURSE VISIT (OUTPATIENT)
Dept: EDUCATION SERVICES | Facility: CLINIC | Age: 49
End: 2019-05-17
Payer: COMMERCIAL

## 2019-05-17 DIAGNOSIS — E11.65 TYPE 2 DIABETES MELLITUS WITH HYPERGLYCEMIA, WITHOUT LONG-TERM CURRENT USE OF INSULIN (H): Primary | ICD-10-CM

## 2019-05-17 NOTE — PATIENT INSTRUCTIONS
1.  Scan your blood sugar at least 4 times per day.    2.  Keep making good food choices and watching portion sizes.    3.  Move as much as possible.  Try to set a small exercise goal and work on it.    4.  See your doctor every three to six months.    Josiane Dennis RN,CDE  [address] [address]  Phone: [phone]  [email or web address]

## 2019-05-17 NOTE — PROGRESS NOTES
"Diabetes Self-Management Education & Support    Diabetes Education Self Management & Training    SUBJECTIVE/OBJECTIVE:     Cultural Influences/Ethnic Background:  American      Diabetes Symptoms & Complications   none     Patient Problem List and Family Medical History reviewed for relevant medical history, current medical status, and diabetes risk factors.    Vitals:    Estimated body mass index is 40.71 kg/m  as calculated from the following:    Height as of 8/31/18: 1.632 m (5' 4.25\").    Weight as of 3/29/19: 108.4 kg (239 lb).   Last 3 BP:   BP Readings from Last 3 Encounters:   03/13/19 128/82   12/14/18 130/86   10/10/18 124/66       History   Smoking Status     Never Smoker   Smokeless Tobacco     Never Used       Labs:  Lab Results   Component Value Date    A1C 7.0 03/11/2019     Lab Results   Component Value Date     12/12/2018     Lab Results   Component Value Date    LDL 88 08/31/2018     HDL Cholesterol   Date Value Ref Range Status   08/31/2018 48 (L) >49 mg/dL Final   ]  GFR Estimate   Date Value Ref Range Status   12/12/2018 74 >60 mL/min/1.7m2 Final     Comment:     Non  GFR Calc     GFR Estimate If Black   Date Value Ref Range Status   12/12/2018 89 >60 mL/min/1.7m2 Final     Comment:      GFR Calc     Lab Results   Component Value Date    CR 0.82 12/12/2018     No results found for: MICROALBUMIN    Healthy Eating   introducing more fruit in diet  More vegetables in diet   Putting more thought into food choices    Being Active  Stands and moves at work 3 days a week  Walks dog  Stairs in house    Monitoring  Scanning blood sugar throughout the day  Range  mg/dl  Average blood sugar is 137    Healthy Coping     Patient Activation Measure Survey Score:  RUPERTO Score (Last Two) 8/31/2016   RUPERTO Raw Score 32   Activation Score 62.6   RUPERTO Level 3       ASSESSMENT:  Patient's most recent   Lab Results   Component Value Date    A1C 7.0 03/11/2019    is  meeting " goal of <7.0    INTERVENTION:     Education provided today on:  Education provided regarding risk reduction of complications of diabetes  This included:  How atherosclerosis develops and the relationship between LDL, hyperglycemia and hypertension in the development and acceleration of atherosclerosis.    The need to keep all three conditions under good control in order to reduce risk for complications.  Educated as to the particular pathogenesis of micro- and macrovascular complications.  Reviewed screening tests, goals and frequency of follow up.  A1C:  What it measures.  Goals are individualized.  Checked Q 3-6 months  BP  :  Goals generally under 130/80, although goals are individualized.  Total cholesterol:  Under 200.  Check at least once yearly  LDL:  Under 100 or under 70 if existing heart disease.  Check at least once yearly  Microalbuminuria:  Under 30 mg/dL.  Check at least once yearly.  Screening for retinopathy:  Should be done yearly or more often if indicated  Monofilament testing for neuropathy:  Should be done at least yearly.  Daily foot care reviewed and handout for foot care given.   Use of Aspirin in DM:  Reviewed indications and contraindications with patient.   Patient is currently not using tobacco.    Opportunities for ongoing education and support in diabetes-self management were discussed.    Pt verbalized understanding of concepts discussed and recommendations provided today.       Education Materials Provided:  Goals for Your Diabetes Care    PLAN:  See Patient Instructions for co-developed, patient-stated behavior change goals.  AVS printed and provided to patient today. See Follow-Up section for recommended follow-up.    Time Spent: 60 minutes  Encounter Type: Individual    Any diabetes medication dose changes were made via the CDE Protocol and Collaborative Practice Agreement with the patient's referring provider. A copy of this encounter was shared with the provider.

## 2019-06-10 DIAGNOSIS — E11.65 TYPE 2 DIABETES MELLITUS WITH HYPERGLYCEMIA, WITHOUT LONG-TERM CURRENT USE OF INSULIN (H): ICD-10-CM

## 2019-06-10 LAB — HBA1C MFR BLD: 7.1 % (ref 0–5.6)

## 2019-06-10 PROCEDURE — 36415 COLL VENOUS BLD VENIPUNCTURE: CPT | Performed by: PHYSICIAN ASSISTANT

## 2019-06-10 PROCEDURE — 83036 HEMOGLOBIN GLYCOSYLATED A1C: CPT | Performed by: PHYSICIAN ASSISTANT

## 2019-06-12 ENCOUNTER — OFFICE VISIT (OUTPATIENT)
Dept: FAMILY MEDICINE | Facility: CLINIC | Age: 49
End: 2019-06-12
Payer: COMMERCIAL

## 2019-06-12 VITALS
HEART RATE: 84 BPM | RESPIRATION RATE: 16 BRPM | OXYGEN SATURATION: 99 % | SYSTOLIC BLOOD PRESSURE: 132 MMHG | HEIGHT: 64 IN | WEIGHT: 239 LBS | BODY MASS INDEX: 40.8 KG/M2 | TEMPERATURE: 98.8 F | DIASTOLIC BLOOD PRESSURE: 80 MMHG

## 2019-06-12 DIAGNOSIS — M54.2 NECK PAIN: Chronic | ICD-10-CM

## 2019-06-12 DIAGNOSIS — M79.18 MYOFASCIAL PAIN: Chronic | ICD-10-CM

## 2019-06-12 DIAGNOSIS — R41.3 MEMORY CHANGES: ICD-10-CM

## 2019-06-12 DIAGNOSIS — E11.65 TYPE 2 DIABETES MELLITUS WITH HYPERGLYCEMIA, WITHOUT LONG-TERM CURRENT USE OF INSULIN (H): Primary | ICD-10-CM

## 2019-06-12 DIAGNOSIS — F11.90 CHRONIC, CONTINUOUS USE OF OPIOIDS: ICD-10-CM

## 2019-06-12 DIAGNOSIS — Z78.9 MEASLES, MUMPS, RUBELLA (MMR) VACCINATION STATUS UNKNOWN: ICD-10-CM

## 2019-06-12 DIAGNOSIS — Z23 NEED FOR VACCINATION: ICD-10-CM

## 2019-06-12 PROCEDURE — 90471 IMMUNIZATION ADMIN: CPT | Performed by: PHYSICIAN ASSISTANT

## 2019-06-12 PROCEDURE — 90750 HZV VACC RECOMBINANT IM: CPT | Performed by: PHYSICIAN ASSISTANT

## 2019-06-12 PROCEDURE — 99214 OFFICE O/P EST MOD 30 MIN: CPT | Mod: 25 | Performed by: PHYSICIAN ASSISTANT

## 2019-06-12 RX ORDER — HYDROCODONE BITARTRATE AND ACETAMINOPHEN 5; 325 MG/1; MG/1
1 TABLET ORAL EVERY 6 HOURS PRN
Qty: 40 TABLET | Refills: 0 | Status: SHIPPED | OUTPATIENT
Start: 2019-06-12 | End: 2019-09-11

## 2019-06-12 ASSESSMENT — MIFFLIN-ST. JEOR: SCORE: 1693.07

## 2019-06-12 NOTE — PROGRESS NOTES
Subjective     Brenda Renee is a 49 year old female who presents to clinic today for the following health issues:    HPI   Diabetes Follow-up      How often are you checking your blood sugar? Has sensor on arm, seems higher recently, under stress    What time of day are you checking your blood sugars (select all that apply)?      Have you had any blood sugars above 200?  No    Have you had any blood sugars below 70?  No    What symptoms do you notice when your blood sugar is low?  Shaky and Dizzy    What concerns do you have today about your diabetes? None     Do you have any of these symptoms? (Select all that apply)  Numbness in feet and Burning in feet, shooting pain in heel-from achilles tendon pain or neuropathy?  Needs paperwork filled out with work.  Given fax information so they can have that sent to her attention.       Have you had a diabetic eye exam in the last 12 months? Yes- Date of last eye exam: 09/2018    BP Readings from Last 2 Encounters:   06/12/19 132/80   03/13/19 128/82     Hemoglobin A1C (%)   Date Value   06/10/2019 7.1 (H)   03/11/2019 7.0 (H)     LDL Cholesterol Calculated (mg/dL)   Date Value   08/31/2018 88   07/05/2017 91       Diabetes Management Resources  Hypertension Follow-up      Do you check your blood pressure regularly outside of the clinic? No     Are you following a low salt diet? Yes    Are your blood pressures ever more than 140 on the top number (systolic) OR more   than 90 on the bottom number (diastolic), for example 140/90? Yes  Chronic Pain Follow-Up       Type / Location of Pain: back pain  Analgesia/pain control:       Recent changes:  Same-will be traveling to [place] soon, would like to have script for 6 hour flight, unsure if this will trigger back pain      Overall control: Tolerable with discomfort  Activity level/function:      Daily activities:  Able to do moderate activities and Can do most things most days, with some rest    Work:  Pain does limit  her part time work  Adverse effects:  No  Adherance    Taking medication as directed?  Yes    Participating in other treatments: not applicable  Risk Factors:    Sleep:  Fair    Mood/anxiety:  controlled    Recent family or social stressors:  none noted    Other aggravating factors: none  PHQ-9 SCORE 5/15/2015   PHQ-9 Total Score 5     No flowsheet data found.  Encounter-Level CSA:    There are no encounter-level csa.     Patient-Level CSA:    There are no patient-level csa.         Amount of exercise or physical activity: on feet all day at work and chores around the house, no specific routine    Problems taking medications regularly: No    Medication side effects: stomach issues still-diarrhea, no vomiting-unsure if this is a side effect    Diet: regular (no restrictions)    -has form for work, FMLA   Recent Labs   Lab Test 06/10/19  1114 03/11/19  1032 12/12/18  1558 12/12/18  1111  08/31/18  0838 07/05/17  1032 08/31/16  0841  05/15/15  0838   A1C 7.1* 7.0*  --  7.6*   < > 14.0* 7.0* 6.2*   < >  --    LDL  --   --   --   --   --  88 91 86  --  80   HDL  --   --   --   --   --  48* 42* 48*  --  53   TRIG  --   --   --   --   --  134 110 52  --  48   ALT  --   --  63*  --   --  123* 64*  --   --  28   CR  --   --  0.82  --   --  0.70 0.85 0.97  --  1.00   GFRESTIMATED  --   --  74  --   --  89 71 62  --  60*   GFRESTBLACK  --   --  89  --   --  >90 86 75  --  72   POTASSIUM  --   --  4.0  --   --  4.1 4.1  --   --  3.6   TSH  --   --   --   --   --  2.26  --   --   --  1.61    < > = values in this interval not displayed.      BP Readings from Last 3 Encounters:   06/12/19 132/80   03/13/19 128/82   12/14/18 130/86    Wt Readings from Last 3 Encounters:   06/12/19 108.4 kg (239 lb)   03/29/19 108.4 kg (239 lb)   03/13/19 108.9 kg (240 lb)           Reviewed and updated as needed this visit by Provider  Tobacco  Allergies  Meds  Problems  Med Hx  Surg Hx  Fam Hx  Soc Hx          Additional complaints: Pt  "concerned for memory loss.  Did minicog at last visit, mini mental today.  She scored 30/30.    Would like titer checked for MMR.    HPI additional notes: Brenda presents today with   Chief Complaint   Patient presents with     Hypertension     Diabetes   Having more frequent flares of neck, hand and foot pain and back pain.  Will be traveling to [place] from June 18th to July 1st.           Review of Systems   C: Negative for fever, chills, recent change in weight  Skin: Negative for worrisome rashes or lesions  ENT: Negative for ear, mouth and throat problems  Resp: Negative for significant cough or SOB  CV: Negative for chest pain or peripheral edema  GI: Negative for nausea, abdominal pain, heartburn, or change in bowel habits  MS: Positive for chronic pain  NEURO: memory problems  P: Negative for changes in mood or affect  ROS all other systems negative.        Objective    /80   Pulse 84   Temp 98.8  F (37.1  C) (Tympanic)   Resp 16   Ht 1.632 m (5' 4.25\")   Wt 108.4 kg (239 lb)   SpO2 99%   BMI 40.71 kg/m    Body mass index is 40.71 kg/m .  Physical Exam   GENERAL: healthy, alert, in no acute distress  EYES: Grossly normal to inspection, EOMI, PERRL  HENT: Mucous mebranes moist.  NECK: Non-tender, no adenopathy.  RESP: lungs clear to auscultation - no rales, no rhonchi, no wheezes  CV: regular rate and rhythm, normal S1 S2. No peripheral edema.  MS: no gross deformities noted.  No CVA tenderness. No paralumbar tenderness.  SKIN: no suspicious lesions, no rashes  PSYCH: Alert and oriented times 3;  Able to articulate logical thoughts. Affect is normal.    Diagnostic test results:   Results for orders placed or performed in visit on 06/10/19   Hemoglobin A1c   Result Value Ref Range    Hemoglobin A1C 7.1 (H) 0 - 5.6 %           Assessment & Plan     BMI:   Estimated body mass index is 40.71 kg/m  as calculated from the following:    Height as of this encounter: 1.632 m (5' 4.25\").    Weight as of " this encounter: 108.4 kg (239 lb).   Weight management plan: Discussed healthy diet and exercise guidelines        ICD-10-CM    1. Type 2 diabetes mellitus with hyperglycemia, without long-term current use of insulin (H) E11.65    2. Need for vaccination Z23 HC ZOSTER VACCINE RECOMBINANT ADJUVANTED IM NJX     VACCINE ADMINISTRATION, INITIAL   3. Neck pain M54.2 HYDROcodone-acetaminophen (NORCO) 5-325 MG tablet   4. Myofascial pain M79.18 HYDROcodone-acetaminophen (NORCO) 5-325 MG tablet   5. Chronic, continuous use of opioids F11.90    6. Measles, mumps, rubella (MMR) vaccination status unknown Z78.9 Mumps Antibody IgG     Rubeola Antibody IgG     Rubella Antibody IgG Quantitative   7. Memory changes R41.3 Mini-Mental Status Exam     Labs today to check for MMR.    Discussed perfect score on Mini Mental, I have no concerns about her memory.  Norco filled today.  A1c stable, will continue on current medications.    Please see patient instructions for treatment details.    Return in about 3 months (around 9/12/2019) for Diabetes Follow Up.    Chasidy Bajwa PA-C  [facility]

## 2019-06-12 NOTE — NURSING NOTE
Screening Questionnaire for Adult Immunization    Are you sick today?   No   Do you have allergies to medications, food, a vaccine component or latex?   No   Have you ever had a serious reaction after receiving a vaccination?   No   Do you have a long-term health problem with heart disease, lung disease, asthma, kidney disease, metabolic disease (e.g. diabetes), anemia, or other blood disorder?   No   Do you have cancer, leukemia, HIV/AIDS, or any other immune system problem?   No   In the past 3 months, have you taken medications that affect  your immune system, such as prednisone, other steroids, or anticancer drugs; drugs for the treatment of rheumatoid arthritis, Crohn s disease, or psoriasis; or have you had radiation treatments?   No   Have you had a seizure, or a brain or other nervous system problem?   No   During the past year, have you received a transfusion of blood or blood     products, or been given immune (gamma) globulin or antiviral drug?   No   For women: Are you pregnant or is there a chance you could become        pregnant during the next month?   No   Have you received any vaccinations in the past 4 weeks?   No     Immunization questionnaire answers were all negative.        Per orders of JAY Azul, injection of Shingrix given by Siri Jones. Patient instructed to remain in clinic for 15 minutes afterwards, and to report any adverse reaction to me immediately.       Screening performed by Siri Jones on 6/12/2019 at 10:08 AM.'

## 2019-07-03 ENCOUNTER — ALLIED HEALTH/NURSE VISIT (OUTPATIENT)
Dept: EDUCATION SERVICES | Facility: CLINIC | Age: 49
End: 2019-07-03
Payer: COMMERCIAL

## 2019-07-03 DIAGNOSIS — E11.65 TYPE 2 DIABETES MELLITUS WITH HYPERGLYCEMIA, WITHOUT LONG-TERM CURRENT USE OF INSULIN (H): Primary | ICD-10-CM

## 2019-07-05 VITALS — WEIGHT: 239 LBS | BODY MASS INDEX: 40.71 KG/M2

## 2019-07-05 NOTE — PROGRESS NOTES
"Diabetes Self-Management Education & Support    Diabetes Education Self Management & Training    SUBJECTIVE/OBJECTIVE:  Presents for: Follow-up  Accompanied by: Self  Diabetes education in the past 24mo: Yes  Focus of Visit: Healthy Eating  Diabetes type: Type 2  Date of diagnosis: 2018  Disease course: Stable  Other concerns:: None  Cultural Influences/Ethnic Background:  American    Diabetes Symptoms & Complications     Patient Problem List and Family Medical History reviewed for relevant medical history, current medical status, and diabetes risk factors.    Vitals:  Wt 108.4 kg (239 lb)   BMI 40.71 kg/m    Estimated body mass index is 40.71 kg/m  as calculated from the following:    Height as of 6/12/19: 1.632 m (5' 4.25\").    Weight as of this encounter: 108.4 kg (239 lb).   Weight is up about 9 lbs this year.  Last 3 BP:   BP Readings from Last 3 Encounters:   06/12/19 132/80   03/13/19 128/82   12/14/18 130/86       History   Smoking Status     Never Smoker   Smokeless Tobacco     Never Used       Labs:  Lab Results   Component Value Date    A1C 7.1 06/10/2019     Lab Results   Component Value Date     12/12/2018     Lab Results   Component Value Date    LDL 88 08/31/2018     HDL Cholesterol   Date Value Ref Range Status   08/31/2018 48 (L) >49 mg/dL Final   ]  GFR Estimate   Date Value Ref Range Status   12/12/2018 74 >60 mL/min/1.7m2 Final     Comment:     Non  GFR Calc     GFR Estimate If Black   Date Value Ref Range Status   12/12/2018 89 >60 mL/min/1.7m2 Final     Comment:      GFR Calc     Lab Results   Component Value Date    CR 0.82 12/12/2018     No results found for: MICROALBUMIN    Healthy Eating  Healthy Eating Assessed Today: Yes  Patient reports that she is trying to watch portions, eat less pasta, less treats  Reports that she sometimes feels hypoglycemic and has had some lows show up on her peggy report, but she does not check and confirm this with a " meter. Discussed with her that the peggy sensor will sometimes give false lows or have a tendency to run slightly lower than the glucose meter and that she should double check any lows with her meter. She states she is eating more (when not necessarily hungry) because of the concern she might be low. She has had some weight gain this year. Reinforced that it is very uncommon for metformin to cause low blood glucose and that she should always check her glucose when she feels off. Patient tells me today that she also believes that the feelings could be anxiety related.     Being Active  Being Active Assessed Today: Yes  [place]  Her plan is to get back to walking her dogs now that she is home.     Monitoring         Taking Medications  Diabetes Medication(s)     Biguanides       metFORMIN (GLUCOPHAGE-XR) 500 MG 24 hr tablet    Take 2 tablets (1,000 mg) by mouth 2 times daily (with meals)          Taking Medication Assessed Today: Yes  Current Treatments: Oral Agent (monotherapy)  Problems taking diabetes medications regularly?: No  Diabetes medication side effects?: No  Treatment Compliance: All of the time    Problem Solving  Hypoglycemia Frequency: Rarely  Hypoglycemia Treatment: Candy, Other food  Patient carries a carbohydrate source: Yes    Reducing Risks  Reducing Risks Assessed Today: No    Healthy Coping  Emotional response to diabetes: Ready to learn  Stage of change: ACTION (Actively working towards change)  Patient Activation Measure Survey Score:  RUPERTO Score (Last Two) 8/31/2016   RUPERTO Raw Score 32   Activation Score 62.6   RUPERTO Level 3       ASSESSMENT:    Patient's most recent   Lab Results   Component Value Date    A1C 7.1 06/10/2019    is not meeting goal of <7.0    INTERVENTION:   Diabetes knowledge and skills assessment:     Patient is knowledgeable in diabetes management concepts related to: Healthy Eating, Being Active, Monitoring, Taking  Medication, Problem Solving, Reducing Risks and Healthy Coping    Based on learning assessment above, most appropriate setting for further diabetes education would be: Individual setting.    Education provided today on:  AADE Self-Care Behaviors:  Healthy Eating: consistency in amount, composition, and timing of food intake and portion control    Opportunities for ongoing education and support in diabetes-self management were discussed.    Pt verbalized understanding of concepts discussed and recommendations provided today.       Education Materials Provided:  No new materials provided today    PLAN:  See Patient Instructions for co-developed, patient-stated behavior change goals.  AVS printed and provided to patient today. See Follow-Up section for recommended follow-up.  Continue diet recommendations as previously seen on AVS  Whenever possible double check any glucose under 70 with your meter   Time Spent: 60 minutes  Encounter Type: Individual    Any diabetes medication dose changes were made via the CDE Protocol and Collaborative Practice Agreement with the patient's referring provider. A copy of this encounter was shared with the provider.

## 2019-08-15 DIAGNOSIS — I10 ESSENTIAL HYPERTENSION WITH GOAL BLOOD PRESSURE LESS THAN 140/90: Chronic | ICD-10-CM

## 2019-08-15 RX ORDER — LOSARTAN POTASSIUM 100 MG/1
TABLET ORAL
Qty: 90 TABLET | Refills: 3 | Status: SHIPPED | OUTPATIENT
Start: 2019-08-15 | End: 2020-07-10

## 2019-08-15 NOTE — TELEPHONE ENCOUNTER
"Requested Prescriptions   Pending Prescriptions Disp Refills     losartan (COZAAR) 100 MG tablet [Pharmacy Med Name: LOSARTAN POTASSIUM 100 MG TAB]  Last Written Prescription Date:  8/31/2018  Last Fill Quantity: 90 tablet,  # refills: 3   Last office visit: 6/12/2019 with prescribing provider:  Aydee   Future Office Visit:   Next 5 appointments (look out 90 days)    Sep 11, 2019 11:10 AM CDT  SHORT with Chasidy Bajwa PA-C  [facility] ([facility]) [address] [place] [id number]  [phone]          90 tablet 3     Sig: TAKE 1 TABLET BY MOUTH ONCE DAILY       Angiotensin-II Receptors Passed - 8/15/2019  9:52 AM        Passed - Last blood pressure under 140/90 in past 12 months     BP Readings from Last 3 Encounters:   06/12/19 132/80   03/13/19 128/82   12/14/18 130/86                 Passed - Recent (12 mo) or future (30 days) visit within the authorizing provider's specialty     Patient had office visit in the last 12 months or has a visit in the next 30 days with authorizing provider or within the authorizing provider's specialty.  See \"Patient Info\" tab in inbasket, or \"Choose Columns\" in Meds & Orders section of the refill encounter.              Passed - Medication is active on med list        Passed - Patient is age 18 or older        Passed - No active pregnancy on record        Passed - Normal serum creatinine on file in past 12 months     Recent Labs   Lab Test 12/12/18  1558   CR 0.82             Passed - Normal serum potassium on file in past 12 months     Recent Labs   Lab Test 12/12/18  1558   POTASSIUM 4.0                    Passed - No positive pregnancy test in past 12 months           "

## 2019-08-26 ENCOUNTER — DOCUMENTATION ONLY (OUTPATIENT)
Dept: LAB | Facility: CLINIC | Age: 49
End: 2019-08-26

## 2019-08-26 DIAGNOSIS — E11.65 TYPE 2 DIABETES MELLITUS WITH HYPERGLYCEMIA, WITHOUT LONG-TERM CURRENT USE OF INSULIN (H): Primary | ICD-10-CM

## 2019-08-26 NOTE — PROGRESS NOTES
"Carl Robins,  This patient has a lab only appointment on 9/9/2019 and there are no orders in her chart. The appointment note does not state why she is coming in for a lab only appointment but she has a \"follow up\" appointment with you on 9/11/2019. Will you please look to see if she needs any labs from you.    Thanks!  Sofia Angel  "

## 2019-08-29 ENCOUNTER — TRANSFERRED RECORDS (OUTPATIENT)
Dept: HEALTH INFORMATION MANAGEMENT | Facility: CLINIC | Age: 49
End: 2019-08-29

## 2019-09-09 DIAGNOSIS — E11.65 TYPE 2 DIABETES MELLITUS WITH HYPERGLYCEMIA, WITHOUT LONG-TERM CURRENT USE OF INSULIN (H): ICD-10-CM

## 2019-09-09 LAB — HBA1C MFR BLD: 7.1 % (ref 0–5.6)

## 2019-09-09 PROCEDURE — 80061 LIPID PANEL: CPT | Performed by: PHYSICIAN ASSISTANT

## 2019-09-09 PROCEDURE — 36415 COLL VENOUS BLD VENIPUNCTURE: CPT | Performed by: PHYSICIAN ASSISTANT

## 2019-09-09 PROCEDURE — 83036 HEMOGLOBIN GLYCOSYLATED A1C: CPT | Performed by: PHYSICIAN ASSISTANT

## 2019-09-10 LAB
CHOLEST SERPL-MCNC: 130 MG/DL
HDLC SERPL-MCNC: 43 MG/DL
LDLC SERPL CALC-MCNC: 69 MG/DL
NONHDLC SERPL-MCNC: 86 MG/DL
TRIGL SERPL-MCNC: 86 MG/DL

## 2019-09-11 ENCOUNTER — OFFICE VISIT (OUTPATIENT)
Dept: FAMILY MEDICINE | Facility: CLINIC | Age: 49
End: 2019-09-11
Payer: COMMERCIAL

## 2019-09-11 VITALS
SYSTOLIC BLOOD PRESSURE: 136 MMHG | OXYGEN SATURATION: 99 % | RESPIRATION RATE: 16 BRPM | TEMPERATURE: 99.5 F | DIASTOLIC BLOOD PRESSURE: 84 MMHG | WEIGHT: 244 LBS | HEART RATE: 80 BPM | BODY MASS INDEX: 41.56 KG/M2

## 2019-09-11 DIAGNOSIS — M54.2 NECK PAIN: Chronic | ICD-10-CM

## 2019-09-11 DIAGNOSIS — M79.18 MYOFASCIAL PAIN: Chronic | ICD-10-CM

## 2019-09-11 DIAGNOSIS — E11.65 TYPE 2 DIABETES MELLITUS WITH HYPERGLYCEMIA, WITHOUT LONG-TERM CURRENT USE OF INSULIN (H): Primary | ICD-10-CM

## 2019-09-11 PROCEDURE — 99214 OFFICE O/P EST MOD 30 MIN: CPT | Performed by: PHYSICIAN ASSISTANT

## 2019-09-11 PROCEDURE — 82043 UR ALBUMIN QUANTITATIVE: CPT | Performed by: PHYSICIAN ASSISTANT

## 2019-09-11 RX ORDER — METFORMIN HCL 500 MG
1000 TABLET, EXTENDED RELEASE 24 HR ORAL 2 TIMES DAILY WITH MEALS
Qty: 360 TABLET | Refills: 1 | Status: SHIPPED | OUTPATIENT
Start: 2019-09-11 | End: 2020-03-04

## 2019-09-11 RX ORDER — HYDROCODONE BITARTRATE AND ACETAMINOPHEN 5; 325 MG/1; MG/1
1 TABLET ORAL EVERY 6 HOURS PRN
Qty: 40 TABLET | Refills: 0 | Status: SHIPPED | OUTPATIENT
Start: 2019-09-11 | End: 2019-12-11

## 2019-09-11 NOTE — PROGRESS NOTES
Subjective     Brenda Renee is a 49 year old female who presents to clinic today for the following health issues:    HPI   Diabetes Follow-up      How often are you checking your blood sugar? Has not been checking as often as she should recently, but was checking 2 times per day    What time of day are you checking your blood sugars (select all that apply)?      Have you had any blood sugars above 200?  Yes, has not been testing as frequently, had sunburn and was unable to use the arm sensor.  Is not great about checking her finger sticks.      Have you had any blood sugars below 70?  No    What symptoms do you notice when your blood sugar is low?  None    What concerns do you have today about your diabetes? Vision blurring, tired, sweaty     Do you have any of these symptoms? (Select all that apply)  Numbness/burning in her left heel, unsure if from neuropathy or from plantar fasciitis      Have you had a diabetic eye exam in the last 12 months? Yes- Date of last eye exam: 1-2 weeks ago    BP Readings from Last 2 Encounters:   09/11/19 136/84   06/12/19 132/80     Hemoglobin A1C (%)   Date Value   09/09/2019 7.1 (H)   06/10/2019 7.1 (H)     LDL Cholesterol Calculated (mg/dL)   Date Value   09/09/2019 69   08/31/2018 88     Diabetes Management Resources  Hypertension Follow-up      Do you check your blood pressure regularly outside of the clinic? No     Are you following a low salt diet? Yes    Are your blood pressures ever more than 140 on the top number (systolic) OR more   than 90 on the bottom number (diastolic), for example 140/90? No  Chronic Pain Follow-Up       Type / Location of Pain:   Analgesia/pain control:       Recent changes:  same      Overall control: Tolerable with discomfort  Activity level/function:      Daily activities:  Able to do light housework, cooking and Able to do moderate activities    Work:    Adverse effects:  No  Adherance    Taking medication as directed?  Yes     Participating in other treatments: not applicable  Risk Factors:    Sleep:  poor    Mood/anxiety:  controlled    Recent family or social stressors:  Issues with her home    Other aggravating factors: none  PHQ-9 SCORE 5/15/2015   PHQ-9 Total Score 5     No flowsheet data found.  Encounter-Level CSA:    There are no encounter-level csa.     Patient-Level CSA:    There are no patient-level csa.     How many servings of fruits and vegetables do you eat daily?  4 or more    On average, how many sweetened beverages do you drink each day (soda, juice, sweet tea, etc)?   1    How many days per week do you miss taking your medication? 2-3 times, but does not happen too often    Reviewed and updated as needed this visit by Provider  Tobacco  Allergies  Meds  Problems  Med Hx  Surg Hx  Fam Hx  Soc Hx          Additional complaints: None    HPI additional notes: Brenda presents today with   Chief Complaint   Patient presents with     Diabetes   Hydrocodone hasn't used in 10 days, was at a point where she needed it daily for two weeks.  Stands on a mat at work which helps with pain.  Tries to bend with knees and avoid lifting things that are too heavy.  Has burning and tingling in neck when lifting too high.      Has been having some worsening asthma.  Has cardiology referral for chest pressure.           Review of Systems   C: Negative for fever, chills, recent change in weight  Skin: Negative for worrisome rashes or lesions  ENT: Negative for ear, mouth and throat problems  Resp: SOB and chest pressure with activity.    CV: POSITIVE for chest pain.  Negative for murmur, orthopnea, palpitations and paroxysmal nocturnal dyspnea  GI: Negative for nausea, abdominal pain, heartburn, or change in bowel habits  MS: Positive for neck and back  pain  PSYCHIATRIC: POSITIVE for anxiety, insomnia or stress. Negative for depressed mood  ROS all other systems negative.        Objective    /84   Pulse 80   Temp 99.5  F  (37.5  C) (Tympanic)   Resp 16   Wt 110.7 kg (244 lb)   SpO2 99%   Breastfeeding? No   BMI 41.56 kg/m    Body mass index is 41.56 kg/m .  Physical Exam   GENERAL: healthy, alert, in no acute distress  SKIN: no suspicious lesions, no rashes  PSYCH:Mental Status Exam  Behavior: cooperative, pleasant and interruptive  Speech: rapid tangential  Mood: anxious  Affect: Anxious/Nervous  Thought Processes: Tangential  Thought Content: no evidence of suicidal or homicidal ideation, no overt psychosis, Preoccupations and Ruminations  Insight: Adequate  Judgment: Adequate for safety      Diagnostic test results:   Results for orders placed or performed in visit on 09/09/19   Hemoglobin A1c   Result Value Ref Range    Hemoglobin A1C 7.1 (H) 0 - 5.6 %   Lipid panel reflex to direct LDL Fasting   Result Value Ref Range    Cholesterol 130 <200 mg/dL    Triglycerides 86 <150 mg/dL    HDL Cholesterol 43 (L) >49 mg/dL    LDL Cholesterol Calculated 69 <100 mg/dL    Non HDL Cholesterol 86 <130 mg/dL         Assessment & Plan       ICD-10-CM    1. Type 2 diabetes mellitus with hyperglycemia, without long-term current use of insulin (H) E11.65 metFORMIN (GLUCOPHAGE-XR) 500 MG 24 hr tablet     Albumin Random Urine Quantitative with Creat Ratio   2. Neck pain M54.2 HYDROcodone-acetaminophen (NORCO) 5-325 MG tablet   3. Myofascial pain M79.18 HYDROcodone-acetaminophen (NORCO) 5-325 MG tablet     Follow up in 3 months for annual exam, will try to leave urine sample today.  No changes to diabetic medications, will work on diet and increasing exercise.    Please see patient instructions for treatment details.    Return in about 3 months (around 12/11/2019) for Annual Exam.    Chasidy Bajwa PA-C  [facility]

## 2019-09-11 NOTE — LETTER
September 12, 2019      Brenda Renee  [address] [place] [address]        Dear ,    We are writing to inform you of your test results.    - Your urine microalbumin is normal, showing good kidney function.      Resulted Orders   Albumin Random Urine Quantitative with Creat Ratio   Result Value Ref Range    Creatinine Urine 80 mg/dL    Albumin Urine mg/L 6 mg/L    Albumin Urine mg/g Cr 7.55 0 - 25 mg/g Cr       If you have any questions or concerns, please call the clinic at the number listed above.       Sincerely,        Chasidy Bajwa PA-C

## 2019-09-12 LAB
CREAT UR-MCNC: 80 MG/DL
MICROALBUMIN UR-MCNC: 6 MG/L
MICROALBUMIN/CREAT UR: 7.55 MG/G CR (ref 0–25)

## 2019-12-05 DIAGNOSIS — E11.65 TYPE 2 DIABETES MELLITUS WITH HYPERGLYCEMIA, WITHOUT LONG-TERM CURRENT USE OF INSULIN (H): Primary | ICD-10-CM

## 2019-12-09 DIAGNOSIS — E11.65 TYPE 2 DIABETES MELLITUS WITH HYPERGLYCEMIA, WITHOUT LONG-TERM CURRENT USE OF INSULIN (H): ICD-10-CM

## 2019-12-09 LAB — HBA1C MFR BLD: 6.9 % (ref 0–5.6)

## 2019-12-09 PROCEDURE — 36415 COLL VENOUS BLD VENIPUNCTURE: CPT | Performed by: PHYSICIAN ASSISTANT

## 2019-12-09 PROCEDURE — 80053 COMPREHEN METABOLIC PANEL: CPT | Performed by: PHYSICIAN ASSISTANT

## 2019-12-09 PROCEDURE — 83036 HEMOGLOBIN GLYCOSYLATED A1C: CPT | Performed by: PHYSICIAN ASSISTANT

## 2019-12-10 LAB
ALBUMIN SERPL-MCNC: 3.3 G/DL (ref 3.4–5)
ALP SERPL-CCNC: 91 U/L (ref 40–150)
ALT SERPL W P-5'-P-CCNC: 57 U/L (ref 0–50)
ANION GAP SERPL CALCULATED.3IONS-SCNC: 6 MMOL/L (ref 3–14)
AST SERPL W P-5'-P-CCNC: 46 U/L (ref 0–45)
BILIRUB SERPL-MCNC: 0.2 MG/DL (ref 0.2–1.3)
BUN SERPL-MCNC: 14 MG/DL (ref 7–30)
CALCIUM SERPL-MCNC: 8.9 MG/DL (ref 8.5–10.1)
CHLORIDE SERPL-SCNC: 105 MMOL/L (ref 94–109)
CO2 SERPL-SCNC: 26 MMOL/L (ref 20–32)
CREAT SERPL-MCNC: 0.72 MG/DL (ref 0.52–1.04)
GFR SERPL CREATININE-BSD FRML MDRD: >90 ML/MIN/{1.73_M2}
GLUCOSE SERPL-MCNC: 165 MG/DL (ref 70–99)
POTASSIUM SERPL-SCNC: 4.1 MMOL/L (ref 3.4–5.3)
PROT SERPL-MCNC: 7.2 G/DL (ref 6.8–8.8)
SODIUM SERPL-SCNC: 137 MMOL/L (ref 133–144)

## 2019-12-11 ENCOUNTER — OFFICE VISIT (OUTPATIENT)
Dept: FAMILY MEDICINE | Facility: CLINIC | Age: 49
End: 2019-12-11
Payer: COMMERCIAL

## 2019-12-11 VITALS
WEIGHT: 243.5 LBS | OXYGEN SATURATION: 98 % | HEIGHT: 64 IN | DIASTOLIC BLOOD PRESSURE: 74 MMHG | BODY MASS INDEX: 41.57 KG/M2 | SYSTOLIC BLOOD PRESSURE: 134 MMHG | RESPIRATION RATE: 18 BRPM | TEMPERATURE: 98.7 F | HEART RATE: 74 BPM

## 2019-12-11 DIAGNOSIS — R55 VASOVAGAL SYNCOPE: ICD-10-CM

## 2019-12-11 DIAGNOSIS — M79.18 MYOFASCIAL PAIN: Chronic | ICD-10-CM

## 2019-12-11 DIAGNOSIS — E66.01 MORBID OBESITY DUE TO EXCESS CALORIES (H): ICD-10-CM

## 2019-12-11 DIAGNOSIS — R07.89 ATYPICAL CHEST PAIN: ICD-10-CM

## 2019-12-11 DIAGNOSIS — M54.2 NECK PAIN: Chronic | ICD-10-CM

## 2019-12-11 DIAGNOSIS — E11.65 TYPE 2 DIABETES MELLITUS WITH HYPERGLYCEMIA, WITHOUT LONG-TERM CURRENT USE OF INSULIN (H): Primary | ICD-10-CM

## 2019-12-11 PROCEDURE — 99215 OFFICE O/P EST HI 40 MIN: CPT | Performed by: PHYSICIAN ASSISTANT

## 2019-12-11 RX ORDER — HYDROCODONE BITARTRATE AND ACETAMINOPHEN 5; 325 MG/1; MG/1
1 TABLET ORAL EVERY 6 HOURS PRN
Qty: 40 TABLET | Refills: 0 | Status: SHIPPED | OUTPATIENT
Start: 2019-12-11 | End: 2020-09-11

## 2019-12-11 ASSESSMENT — MIFFLIN-ST. JEOR: SCORE: 1713.48

## 2019-12-11 NOTE — PROGRESS NOTES
"Subjective     Brenda Renee is a 49 year old female who presents to clinic today for the following health issues:    HPI   Diabetes Follow-up    How often are you checking your blood sugar? A few times a week  What time of day are you checking your blood sugars (select all that apply)?  Before and after meals - when pt feels \"off\"   Have you had any blood sugars above 200?  No  Have you had any blood sugars below 70?  No    What symptoms do you notice when your blood sugar is low? Not applicable    What concerns do you have today about your diabetes? Other: high A1C and metformin side effects (dry mouth)      Do you have any of these symptoms? (Select all that apply)  Numbness in feet and Burning in feet     Have you had a diabetic eye exam in the last 12 months? Yes - Date of last exam: August 2019    BP Readings from Last 2 Encounters:   12/11/19 134/74   09/11/19 136/84     Hemoglobin A1C (%)   Date Value   12/09/2019 6.9 (H)   09/09/2019 7.1 (H)     LDL Cholesterol Calculated (mg/dL)   Date Value   09/09/2019 69   08/31/2018 88       Diabetes Management Resources      How many servings of fruits and vegetables do you eat daily?  4 or more    On average, how many sweetened beverages do you drink each day (Examples: soda, juice, sweet tea, etc.  Do NOT count diet or artificially sweetened beverages)?   0  How many days per week do you miss taking your medication? 1    What makes it hard for you to take your medications?  remembering to take  Recent Labs   Lab Test 12/09/19  1113 09/09/19  1428 06/10/19  1114  12/12/18  1558  08/31/18  0838 07/05/17  1032  05/15/15  0838   A1C 6.9* 7.1* 7.1*   < >  --    < > 14.0* 7.0*   < >  --    LDL  --  69  --   --   --   --  88 91   < > 80   HDL  --  43*  --   --   --   --  48* 42*   < > 53   TRIG  --  86  --   --   --   --  134 110   < > 48   ALT 57*  --   --   --  63*  --  123* 64*  --  28   CR 0.72  --   --   --  0.82  --  0.70 0.85   < > 1.00   GFRESTIMATED >90  " "--   --   --  74  --  89 71   < > 60*   GFRESTBLACK >90  --   --   --  89  --  >90 86   < > 72   POTASSIUM 4.1  --   --   --  4.0  --  4.1 4.1  --  3.6   TSH  --   --   --   --   --   --  2.26  --   --  1.61    < > = values in this interval not displayed.      BP Readings from Last 3 Encounters:   12/11/19 134/74   09/11/19 136/84   06/12/19 132/80    Wt Readings from Last 3 Encounters:   12/11/19 110.5 kg (243 lb 8 oz)   09/11/19 110.7 kg (244 lb)   07/05/19 108.4 kg (239 lb)         Reviewed and updated as needed this visit by Provider  Tobacco  Allergies  Meds  Problems  Med Hx  Surg Hx  Fam Hx         Additional complaints: None    HPI additional notes: Brenda presents today with   Chief Complaint   Patient presents with     Diabetes     recheck   Was at a concert in Nov and passed out.  Have stress from caring for her partner's mother.         Review of Systems   C: Negative for fever, chills, recent change in weight  Skin: Negative for worrisome rashes or lesions  ENT: Negative for ear, mouth and throat problems  Resp: Negative for significant cough or SOB  CV: Negative for chest pain or peripheral edema  GI: Negative for nausea, abdominal pain, heartburn, or change in bowel habits  MS: Negative for significant arthralgias or myalgias  P: Negative for changes in mood or affect  ROS all other systems negative.        Objective    /74 (Patient Position: Sitting, Cuff Size: Adult Large)   Pulse 74   Temp 98.7  F (37.1  C) (Tympanic)   Resp 18   Ht 1.632 m (5' 4.25\")   Wt 110.5 kg (243 lb 8 oz)   SpO2 98%   BMI 41.47 kg/m    Body mass index is 41.47 kg/m .  Physical Exam   GENERAL: healthy, alert, in no acute distress  EYES: Grossly normal to inspection, EOMI, PERRL  HENT: Ear canals normal; TMs pearly gray without effusion. Nasal mucosa moist without edema or discharge. Oral mucous membranes moist, no lesions or ulcerations. Pharynx pink.  Uvula midline.  No postnasal drainage. Sinuses " "non-tender to palpation.  NECK: Non-tender, no adenopathy.  RESP: lungs clear to auscultation - no rales, no rhonchi, no wheezes  CV: regular rate and rhythm, normal S1 S2. No peripheral edema.  MS: no gross deformities noted.  No CVA tenderness. No paralumbar tenderness.  SKIN: no suspicious lesions, no rashes  PSYCH: Alert and oriented times 3;  Able to articulate logical thoughts. Affect is normal.    Diagnostic test results:   Hemoglobin A1C   Date Value Ref Range Status   12/09/2019 6.9 (H) 0 - 5.6 % Final     Comment:     Normal <5.7% Prediabetes 5.7-6.4%  Diabetes 6.5% or higher - adopted from ADA   consensus guidelines.                 Assessment & Plan     BMI:   Estimated body mass index is 41.47 kg/m  as calculated from the following:    Height as of this encounter: 1.632 m (5' 4.25\").    Weight as of this encounter: 110.5 kg (243 lb 8 oz).   Weight management plan: Discussed healthy diet and exercise guidelines        ICD-10-CM    1. Type 2 diabetes mellitus with hyperglycemia, without long-term current use of insulin (H) E11.65 Hemoglobin A1c   2. Morbid obesity due to excess calories (H) E66.01    3. Neck pain M54.2 HYDROcodone-acetaminophen (NORCO) 5-325 MG tablet   4. Myofascial pain M79.18 HYDROcodone-acetaminophen (NORCO) 5-325 MG tablet   5. Atypical chest pain R07.89 CARDIOLOGY EVAL ADULT REFERRAL   6. Vasovagal syncope R55      45 minutes total spent with patient, 30 spent discussing the above plan.  Visit started at 11:30 and ended at 12:15.     Diabetes doing well.  No medication changes needed.    Discussed episodes of vasovagal syncope and management.      Refilled pain medications today.    Discussed weight control, continue to work on diet.      Please see patient instructions for treatment details.    No follow-ups on file.    Chasidy Bajwa PA-C  [facility]      "

## 2019-12-11 NOTE — PATIENT INSTRUCTIONS
Patient Education     Understanding Vasovagal Syncope  Vasovagal syncope is fainting caused by a complex nerve and blood vessel reaction in the body. It s the most common cause of fainting. Unlike other causes of fainting, it s not a sign of a problem with the heart or brain.     How to say it  [id number]-dorina  SINK-o-pee   How vasovagal syncope happens  Many nerves connect with your heart and blood vessels. These nerves help control the speed and force of your heartbeat. They also regulate blood pressure. They control whether your blood vessels should be more open or more closed.  Usually these nerves work together so you always get enough blood to your brain. In certain cases, these nerves may give a wrong signal. This may cause your blood vessels to open wide. At the same time, your heartbeat slows down. Blood can start to pool in your legs, and not enough of it may reach the brain. If that happens, you may briefly lose consciousness. When you lie down or fall down, blood flow to the brain resumes.  What causes vasovagal syncope?  Many triggers can cause vasovagal syncope, such as:    Standing for long periods    Too much heat    Intense emotion, such as fear    Intense pain    The sight of blood or a needle    Exercising for a long time  Older adults may have additional triggers, such as:    Urinating    Swallowing    Coughing    Having a bowel movement  Symptoms of vasovagal syncope  Fainting is the main symptom of vasovagal syncope. You may have symptoms before fainting such as:    Nausea    Warm, flushed feeling    Face that turns pale    Sweaty palms    Feeling dizzy    Blurred vision  If you lie down at the first sign of these symptoms, you will often be able to prevent fainting. Not everyone notices symptoms before fainting, however.  When a person does faint, lying down restores blood flow to the brain. Consciousness should return fairly quickly. You might not feel normal for a little while after you  faint. You might feel depressed or fatigued for a short time. You may even feel nauseous afterwards and vomit.  Some people have only 1 or 2 episodes of vasovagal syncope in their life. For others, it happens more often and with no warning.  Diagnosing vasovagal syncope  Your healthcare provider will ask about your health history and your symptoms. He or she will give you a physical exam. Your blood pressure may be measured while lying down, seated, and standing. You may also have an electrocardiogram (ECG). This is a simple test that looks at the heart s rhythm.  You may be checked for other possible causes of fainting. You may have other tests such as:    Continuous portable ECG monitoring, to look at heart rhythms over time, such as with a holter or event monitor    Echocardiogram, to look at the blood flow in the heart and the heart s motion    Exercise stress testing, to see how your heart does during exercise    Blood tests to check for signs of disease  If these tests are normal, you may have a tilt table test. For this test, you lie down on a platform. Your heart rate and blood pressure are measured while you are lying down. The platform is then tilted upright. Your heart rate and blood pressure are measured again. If you have vasovagal syncope, you may faint during the upward tilt. Sometimes medicines that increase heart rate and the force of heart contractions are used to try and provoke a syncopal episode.  There are many causes of syncope. Some causes are not dangerous. In older persons, unexplained syncope can be a sign of a serious infection or a heart attack. Call 911 or seek immediate medical attention to be evaluated, especially if there has been a fall and injury with the syncope. You should not drive yourself to the hospital or emergency department after a syncopal episode for the safety of yourself or other drivers and passengers. Have someone drive you. Your provider may restrict your driving  until the cause of the syncope is better understood and to make sure the syncope does not become chronic or if it is unpredictable.  Date Last Reviewed: 3/1/2017    9087-2704 [employer]. [address] [place] [address], [place] [address]. All rights reserved. This information is not intended as a substitute for professional medical care. Always follow your healthcare professional's instructions.           Patient Education     Treatment for Vasovagal Syncope  Vasovagal syncope is fainting caused by a complex nerve and blood vessel reaction in the body. It s the most common cause of fainting. Unlike other causes of fainting, it s not a sign of a problem with the heart or brain.     How to say it  [id number]-ff-[id number]-dorina  SINK-o-pee   Types of treatment  If you have had episodes of vasovagal syncope, your healthcare provider may tell you how to help prevent fainting. These might include:    Avoiding known triggers, such as standing for a long time or getting too hot    Stopping medicines that lower blood pressure, such as diuretics    Eating foods with more salt, to help keep up blood volume    Drinking plenty of fluids, to maintain blood volume    Doing moderate exercise such as lower leg strength training    Wearing support stockings to prevent blood pooling in the legs while standing  In some case, your healthcare provider may prescribe a medicine to help control vasovagal syncope. Medicine may or may not work. Your healthcare provider may have you try one of the below:    Alpha-1-adrenergic agonist, to increase your blood pressure such as midodrine    Corticosteroids, to help increase your sodium and fluid levels such as fludrocortisone    Serotonin reuptake inhibitors (SSRIs), to manage your nervous system response  If these medicines don t work for you, your healthcare provider may advise orthostatic training. This method uses a tilt table to gradually increase the amount of time you are upright. In rare cases you  may need a cardiac pacemaker to prevent ongoing fainting.  Avoiding triggers  To help reduce the risk of fainting, try to avoid triggers such as:    Standing for long periods    Too much heat    Intense emotion, such as fear    Intense pain    The sight of blood or a needle    Exercising for a long time  Living with vasovagal syncope  Try to avoid situations that put you at risk and stay well hydrated. Don't skip meals.  Watch for the warning signs of vasovagal syncope. These can include:    Nausea    Warm, flushed feeling    Face that turns pale    Sweaty palms    Feeling dizzy    Blurred vision  If you think you are about to faint, try one or more of these tips:    Lie down right away.    Prop your feet up so that they are higher than your head.    Tense up your arms.    Cross your legs.  If you faint, once you regain consciousness you should rest for a little while before moving around again.  Possible complications of vasovagal syncope  Fainting can be dangerous if it happens at certain times, like while driving. If you have chronic syncope that is not under control, your healthcare provider may advise you not to drive. This is especially important if you don t have warning signs before you faint. Talk with your healthcare provider about what s safe for you to do.     When to call your healthcare provider  Call your healthcare provider if you have fainting that occurs more often  or if you sustain significant injury from your fainting spell.  Unexplained syncope or fainting, especially in older people, can actually be signs of a serious life threatening condition such as a heart attack. Call 911 or seek immediate medical care if the cause of syncope is not known.  Don't drive yourself to the emergency department if you have had a syncopal episode to prevent injury to yourself or other passengers or drivers in the event another episode occurs while you are behind the wheel of a car.   Date Last Reviewed:  2/1/2017 2000-2018 [employer]. [address] [place] [address], [place] [address]. All rights reserved. This information is not intended as a substitute for professional medical care. Always follow your healthcare professional's instructions.

## 2019-12-13 ENCOUNTER — ALLIED HEALTH/NURSE VISIT (OUTPATIENT)
Dept: EDUCATION SERVICES | Facility: CLINIC | Age: 49
End: 2019-12-13
Payer: COMMERCIAL

## 2019-12-13 VITALS — WEIGHT: 243 LBS | BODY MASS INDEX: 41.39 KG/M2

## 2019-12-13 DIAGNOSIS — E11.9 TYPE 2 DIABETES MELLITUS (H): Primary | ICD-10-CM

## 2019-12-13 NOTE — PROGRESS NOTES
"Diabetes Self-Management Education & Support  Diabetes Education Self Management & Training    SUBJECTIVE/OBJECTIVE:  Presents for: Follow-up  Accompanied by: Self  Diabetes education in the past 24mo: Yes  Focus of Visit: Healthy Eating, Assistance w/ making life changes, Self-care behavioral goal setting, Being Active  Diabetes type: Type 2  Transportation concerns: No  Other concerns:: None  Cultural Influences/Ethnic Background:  American    Diabetes Symptoms & Complications    Patient Problem List and Family Medical History reviewed for relevant medical history, current medical status, and diabetes risk factors.    Vitals:  Wt 110.2 kg (243 lb)   BMI 41.39 kg/m    Estimated body mass index is 41.39 kg/m  as calculated from the following:    Height as of 12/11/19: 1.632 m (5' 4.25\").    Weight as of this encounter: 110.2 kg (243 lb).   Last 3 BP:   BP Readings from Last 3 Encounters:   12/11/19 134/74   09/11/19 136/84   06/12/19 132/80       History   Smoking Status     Never Smoker   Smokeless Tobacco     Never Used       Labs:  Lab Results   Component Value Date    A1C 6.9 12/09/2019     Lab Results   Component Value Date     12/09/2019     Lab Results   Component Value Date    LDL 69 09/09/2019     HDL Cholesterol   Date Value Ref Range Status   09/09/2019 43 (L) >49 mg/dL Final   ]  GFR Estimate   Date Value Ref Range Status   12/09/2019 >90 >60 mL/min/[1.73_m2] Final     Comment:     Non  GFR Calc  Starting 12/18/2018, serum creatinine based estimated GFR (eGFR) will be   calculated using the Chronic Kidney Disease Epidemiology Collaboration   (CKD-EPI) equation.       GFR Estimate If Black   Date Value Ref Range Status   12/09/2019 >90 >60 mL/min/[1.73_m2] Final     Comment:      GFR Calc  Starting 12/18/2018, serum creatinine based estimated GFR (eGFR) will be   calculated using the Chronic Kidney Disease Epidemiology Collaboration   (CKD-EPI) equation.       Lab " Results   Component Value Date    CR 0.72 12/09/2019     No results found for: MICROALBUMIN    Healthy Eating  Patient on a regular basis: Eats 3 meals a day  Meal planning: Avoiding sweets  Avoids artificial sweeteners.  Has been drinking some fresh squeezed OJ and eating saltines, thought her A1C was going to possibly be up, but it came down.  Diet varies but includes belvita or Kind bar for breakfast  Trail mix for snack or a piece of fruit  Chicken wild rice soup  She would like to be cooking more vegetables at home but states her kitchen is currently in dissaray and needs to be cleared out so she can use it.     Being Active  Being Active Assessed Today: Yes  Exercise:: Currently not exercising  Notes she isn't planning to do much at this time but rather considering getting started with more of a routine in the spring.   She has a treadmill which she is considering clearing off so she can use it.    Monitoring  Monitoring Assessed Today: No  Did patient bring glucose meter to appointment? : No  Has not been testing recently     Taking Medications  Diabetes Medication(s)     Biguanides       metFORMIN (GLUCOPHAGE-XR) 500 MG 24 hr tablet    Take 2 tablets (1,000 mg) by mouth 2 times daily (with meals)          Current Treatments: Oral Agent (monotherapy)  Problems taking diabetes medications regularly?: No  Diabetes medication side effects?: No  Treatment Compliance: Most of the time    Problem Solving  Problem Solving Assessed Today: No    Reducing Risks  Up to date with eye exam    Healthy Coping  Emotional response to diabetes: Ready to learn  Patient Activation Measure Survey Score:  RUPERTO Score (Last Two) 8/31/2016   RUPERTO Raw Score 32   Activation Score 62.6   RUPERTO Level 3       ASSESSMENT:  Assist Brenda in behavior goal setting to aid in glucose control and adequate nutrition.   Motivational interviewing done with patient today. She tells me that she would like to stop the metformin some day and describes  "wanting to make lifestyle changes, but states that time and her home being \"cluttered\" are barriers. She recognizes that these may be \"excuses\". She is contemplating change.     Patient's most recent   Lab Results   Component Value Date    A1C 6.9 12/09/2019    is meeting goal of <7.0    INTERVENTION:   Education provided today on:  AADE Self-Care Behaviors:  Healthy Eating: general healthy eating, weight management   Being Active: relationship to blood glucose  Monitoring: discussed doing some scheduled testing  Healthy Coping: benefits of making appropriate lifestyle changes    Opportunities for ongoing education and support in diabetes-self management were discussed.    Pt verbalized understanding of concepts discussed and recommendations provided today.       PLAN:  See Patient Instructions for co-developed, patient-stated behavior change goals.  AVS printed and provided to patient today. See Follow-Up section for recommended follow-up.  Clear out kitchen and the treadmill so you can start cooking and being more active  Do some scheduled glucose testing. Pick four days every month and do more intensive testing - check four times per day those days.   Check your peggy sensor expiration date so that you can use that before it expires.   Time Spent: 60 minutes  Encounter Type: Individual    Any diabetes medication dose changes were made via the CDE Protocol and Collaborative Practice Agreement with the patient's referring provider. A copy of this encounter was shared with the provider.     "

## 2019-12-17 ENCOUNTER — TELEPHONE (OUTPATIENT)
Dept: FAMILY MEDICINE | Facility: CLINIC | Age: 49
End: 2019-12-17

## 2019-12-17 NOTE — TELEPHONE ENCOUNTER
Patient Contact    Attempt # 1    Was call answered?  No.  Mailbox full, unable to leave VM.    Upon callback, relay provider message below.

## 2019-12-17 NOTE — TELEPHONE ENCOUNTER
Please call pt and let her know there is no indication to stop metformin at this time.  Impurity levels in the [place] have been found to be below the acceptable levels that the human body can be exposed to.  There is further research being done and we will let her know if there is a medication recall.

## 2019-12-18 NOTE — TELEPHONE ENCOUNTER
Patient was notified with information noted by provider and agreed with plan.  KIRK JamisonN, RN  [employer] [facility]

## 2019-12-23 ENCOUNTER — TRANSFERRED RECORDS (OUTPATIENT)
Dept: HEALTH INFORMATION MANAGEMENT | Facility: CLINIC | Age: 49
End: 2019-12-23

## 2020-01-03 ENCOUNTER — TRANSFERRED RECORDS (OUTPATIENT)
Dept: HEALTH INFORMATION MANAGEMENT | Facility: CLINIC | Age: 50
End: 2020-01-03

## 2020-03-02 DIAGNOSIS — E11.65 TYPE 2 DIABETES MELLITUS WITH HYPERGLYCEMIA, WITHOUT LONG-TERM CURRENT USE OF INSULIN (H): ICD-10-CM

## 2020-03-02 LAB — HBA1C MFR BLD: 9.6 % (ref 0–5.6)

## 2020-03-02 PROCEDURE — 36415 COLL VENOUS BLD VENIPUNCTURE: CPT | Performed by: PHYSICIAN ASSISTANT

## 2020-03-02 PROCEDURE — 83036 HEMOGLOBIN GLYCOSYLATED A1C: CPT | Performed by: PHYSICIAN ASSISTANT

## 2020-03-03 NOTE — PROGRESS NOTES
Subjective     Brenda Renee is a 50 year old female who presents to clinic today for the following health issues:    HPI   Diabetes Follow-up      How often are you checking your blood sugar? Not at all    What concerns do you have today about your diabetes? None     Do you have any of these symptoms? (Select all that apply)  Numbness in feet and Burning in feet    BP Readings from Last 2 Encounters:   03/04/20 118/70   12/11/19 134/74     Hemoglobin A1C (%)   Date Value   03/02/2020 9.6 (H)   12/09/2019 6.9 (H)     LDL Cholesterol Calculated (mg/dL)   Date Value   09/09/2019 69   08/31/2018 88     Hyperlipidemia Follow-Up      Are you regularly taking any medication or supplement to lower your cholesterol?   No    Are you having muscle aches or other side effects that you think could be caused by your cholesterol lowering medication?  n/a      How many servings of fruits and vegetables do you eat daily?  4 or more    On average, how many sweetened beverages do you drink each day (Examples: soda, juice, sweet tea, etc.  Do NOT count diet or artificially sweetened beverages)?   0    How many days per week do you exercise enough to make your heart beat faster? 3 or less    How many minutes a day do you exercise enough to make your heart beat faster? 30 - 60    How many days per week do you miss taking your medication? 0  Recent Labs   Lab Test 03/02/20  1110 12/09/19  1113 09/09/19  1428  12/12/18  1558  08/31/18  0838 07/05/17  1032  05/15/15  0838   A1C 9.6* 6.9* 7.1*   < >  --    < > 14.0* 7.0*   < >  --    LDL  --   --  69  --   --   --  88 91   < > 80   HDL  --   --  43*  --   --   --  48* 42*   < > 53   TRIG  --   --  86  --   --   --  134 110   < > 48   ALT  --  57*  --   --  63*  --  123* 64*  --  28   CR  --  0.72  --   --  0.82  --  0.70 0.85   < > 1.00   GFRESTIMATED  --  >90  --   --  74  --  89 71   < > 60*   GFRESTBLACK  --  >90  --   --  89  --  >90 86   < > 72   POTASSIUM  --  4.1  --   --   "4.0  --  4.1 4.1  --  3.6   TSH  --   --   --   --   --   --  2.26  --   --  1.61    < > = values in this interval not displayed.      BP Readings from Last 3 Encounters:   03/04/20 118/70   12/11/19 134/74   09/11/19 136/84    Wt Readings from Last 3 Encounters:   03/04/20 110.7 kg (244 lb)   12/13/19 110.2 kg (243 lb)   12/11/19 110.5 kg (243 lb 8 oz)          Reviewed and updated as needed this visit by Provider  Tobacco  Allergies  Meds  Problems  Med Hx  Surg Hx  Fam Hx  Soc Hx        Additional complaints: None    HPI additional notes: Brenda presents today with   Chief Complaint   Patient presents with     Diabetes   Misses metformin sometimes 2-3 times per week.  Has been eating more dry fruit.           Review of Systems   C: Negative for fever, chills, recent change in weight  Skin: Negative for worrisome rashes or lesions  ENT: Negative for ear, mouth and throat problems  Resp: Negative for significant cough or SOB  CV: Negative for chest pain or peripheral edema  GI: Negative for nausea, abdominal pain, heartburn, or change in bowel habits  MS: POSITIVE for generalized fatigue and malaise.  PSYCHIATRIC: POSITIVE for stress. Negative for depressed mood  ROS all other systems negative.        Objective    /70   Pulse 84   Temp 98.1  F (36.7  C) (Tympanic)   Resp 16   Ht 1.626 m (5' 4\")   Wt 110.7 kg (244 lb)   SpO2 98%   BMI 41.88 kg/m    Body mass index is 41.88 kg/m .  Physical Exam   GENERAL: healthy, alert, in no acute distress  SKIN: no suspicious lesions, no rashes  PSYCH: Alert and oriented times 3;  Able to articulate logical thoughts. Affect is normal.    Diagnostic test results: none         Assessment & Plan       ICD-10-CM    1. Type 2 diabetes mellitus with hyperglycemia, without long-term current use of insulin (H) E11.65 Hemoglobin A1c     blood glucose (ONETOUCH VERIO IQ) test strip     blood glucose ([employer]) lancets     metFORMIN (GLUCOPHAGE-XR) 500 MG 24 " hr tablet     Pt reports recent poor diet.  Will work on this before next recheck.  No medication changes for now.  If not improving, may need to add invokana    .    Please see patient instructions for treatment details.    Return in about 3 months (around 6/4/2020) for Med Check.    Chasidy Bajwa PA-C  [facility]

## 2020-03-04 ENCOUNTER — OFFICE VISIT (OUTPATIENT)
Dept: FAMILY MEDICINE | Facility: CLINIC | Age: 50
End: 2020-03-04
Payer: COMMERCIAL

## 2020-03-04 VITALS
TEMPERATURE: 98.1 F | RESPIRATION RATE: 16 BRPM | DIASTOLIC BLOOD PRESSURE: 70 MMHG | OXYGEN SATURATION: 98 % | SYSTOLIC BLOOD PRESSURE: 118 MMHG | WEIGHT: 244 LBS | BODY MASS INDEX: 41.66 KG/M2 | HEART RATE: 84 BPM | HEIGHT: 64 IN

## 2020-03-04 DIAGNOSIS — E11.65 TYPE 2 DIABETES MELLITUS WITH HYPERGLYCEMIA, WITHOUT LONG-TERM CURRENT USE OF INSULIN (H): Primary | ICD-10-CM

## 2020-03-04 PROCEDURE — 99213 OFFICE O/P EST LOW 20 MIN: CPT | Performed by: PHYSICIAN ASSISTANT

## 2020-03-04 RX ORDER — METFORMIN HCL 500 MG
1000 TABLET, EXTENDED RELEASE 24 HR ORAL 2 TIMES DAILY WITH MEALS
Qty: 360 TABLET | Refills: 1 | Status: SHIPPED | OUTPATIENT
Start: 2020-03-04 | End: 2020-06-09

## 2020-03-04 RX ORDER — BLOOD-GLUCOSE METER
EACH MISCELLANEOUS
Qty: 100 EACH | Refills: 1 | Status: SHIPPED | OUTPATIENT
Start: 2020-03-04 | End: 2020-06-10

## 2020-03-04 ASSESSMENT — MIFFLIN-ST. JEOR: SCORE: 1706.78

## 2020-04-14 ENCOUNTER — TRANSFERRED RECORDS (OUTPATIENT)
Dept: HEALTH INFORMATION MANAGEMENT | Facility: CLINIC | Age: 50
End: 2020-04-14

## 2020-05-06 ENCOUNTER — TELEPHONE (OUTPATIENT)
Dept: FAMILY MEDICINE | Facility: CLINIC | Age: 50
End: 2020-05-06

## 2020-05-06 NOTE — TELEPHONE ENCOUNTER
RN called back to pt.    Has had multiple months of irregular periods.   Also has had a jump in A1C which was concerning.    Gave a long hx of hormonal imbalance and concerns for endometrial cancer.  On Friday she is going to be getting a biopsy and just wants to give an FYI for PCP.   She will have results sent to our clinic as well as an FYI    Plans to keep diabetic follow up in June.

## 2020-05-06 NOTE — TELEPHONE ENCOUNTER
Reason for Call:  Other call back    Detailed comments:     Brenda called to inform Julienne, that Brenda is having a endometrial biopsy done on Friday.     Because Brenda is diabetic, she wants to review A1C.  Also her irregular periods before her upcoming biopsy.    Phone Number Patient can be reached at: Cell number on file:    Telephone Information:   Mobile [phone]       Best Time: any    Can we leave a detailed message on this number? YES    Call taken on 5/6/2020 at 12:08 PM by Yenni Ryan

## 2020-05-08 ENCOUNTER — TRANSFERRED RECORDS (OUTPATIENT)
Dept: HEALTH INFORMATION MANAGEMENT | Facility: CLINIC | Age: 50
End: 2020-05-08

## 2020-06-01 ENCOUNTER — TRANSFERRED RECORDS (OUTPATIENT)
Dept: HEALTH INFORMATION MANAGEMENT | Facility: CLINIC | Age: 50
End: 2020-06-01

## 2020-06-01 DIAGNOSIS — E11.65 TYPE 2 DIABETES MELLITUS WITH HYPERGLYCEMIA, WITHOUT LONG-TERM CURRENT USE OF INSULIN (H): ICD-10-CM

## 2020-06-01 LAB
HBA1C MFR BLD: 8.4 % (ref 0–5.6)
RETINOPATHY: NORMAL

## 2020-06-01 PROCEDURE — 83036 HEMOGLOBIN GLYCOSYLATED A1C: CPT | Performed by: PHYSICIAN ASSISTANT

## 2020-06-01 PROCEDURE — 36415 COLL VENOUS BLD VENIPUNCTURE: CPT | Performed by: PHYSICIAN ASSISTANT

## 2020-06-08 DIAGNOSIS — E11.65 TYPE 2 DIABETES MELLITUS WITH HYPERGLYCEMIA, WITHOUT LONG-TERM CURRENT USE OF INSULIN (H): ICD-10-CM

## 2020-06-08 NOTE — TELEPHONE ENCOUNTER
"Requested Prescriptions   Pending Prescriptions Disp Refills     metFORMIN (GLUCOPHAGE-XR) 500 MG 24 hr tablet  Last Written Prescription Date:  3/4/2020  Last Fill Quantity: 360 tablet,  # refills: 1   Last office visit: 3/4/2020 with prescribing provider:  Aydee   Future Office Visit:   Next 5 appointments (look out 90 days)    [place] 10, 2020  9:30 AM CDT  Telephone Visit with Chasidy Bajwa PA-C  [facility] ([facility]) [address] [place] [id number] [phone]          360 tablet 1     Sig: Take 2 tablets (1,000 mg) by mouth 2 times daily (with meals)       Biguanide Agents Passed - 6/8/2020  7:27 AM        Passed - Patient is age 10 or older        Passed - Patient has documented A1c within the specified period of time.     If HgbA1C is 8 or greater, it needs to be on file within the past 3 months.  If less than 8, must be on file within the past 6 months.     Recent Labs   Lab Test 06/01/20  1111   A1C 8.4*             Passed - Patient's CR is NOT>1.4 OR Patient's EGFR is NOT<45 within past 12 mos.     Recent Labs   Lab Test 12/09/19  1113   GFRESTIMATED >90   GFRESTBLACK >90       Recent Labs   Lab Test 12/09/19  1113   CR 0.72             Passed - Patient does NOT have a diagnosis of CHF.        Passed - Medication is active on med list        Passed - Patient is not pregnant        Passed - Patient has not had a positive pregnancy test within the past 12 mos.         Passed - Recent (6 mo) or future (30 days) visit within the authorizing provider's specialty     Patient had office visit in the last 6 months or has a visit in the next 30 days with authorizing provider or within the authorizing provider's specialty.  See \"Patient Info\" tab in inbasket, or \"Choose Columns\" in Meds & Orders section of the refill encounter.                  "

## 2020-06-08 NOTE — TELEPHONE ENCOUNTER
Reason for Call:  Medication or medication refill:    Do you use a [facility]?  Name of the pharmacy and phone number [facility]    Name of the medication requested: metFORMIN (GLUCOPHAGE-XR) 500 MG 24 hr tablet    Other request: needs 90 day  Supply has to visit with magan wednesday    Can we leave a detailed message on this number? YES    Phone number patient can be reached at: Home number on file [phone] (home)    Best Time:     Call taken on 6/8/2020 at 7:26 AM by CRISTÓBAL RAMSAY

## 2020-06-09 PROBLEM — R41.3 IMPAIRED MEMORY: Status: RESOLVED | Noted: 2019-03-14 | Resolved: 2020-06-09

## 2020-06-09 RX ORDER — METFORMIN HCL 500 MG
1000 TABLET, EXTENDED RELEASE 24 HR ORAL 2 TIMES DAILY WITH MEALS
Qty: 360 TABLET | Refills: 1 | Status: SHIPPED | OUTPATIENT
Start: 2020-06-09 | End: 2020-09-11

## 2020-06-10 ENCOUNTER — VIRTUAL VISIT (OUTPATIENT)
Dept: FAMILY MEDICINE | Facility: CLINIC | Age: 50
End: 2020-06-10
Payer: COMMERCIAL

## 2020-06-10 DIAGNOSIS — M25.579 PAIN IN JOINT, ANKLE AND FOOT, UNSPECIFIED LATERALITY: ICD-10-CM

## 2020-06-10 DIAGNOSIS — Z12.11 SCREENING FOR COLON CANCER: ICD-10-CM

## 2020-06-10 DIAGNOSIS — E11.65 TYPE 2 DIABETES MELLITUS WITH HYPERGLYCEMIA, WITHOUT LONG-TERM CURRENT USE OF INSULIN (H): Primary | ICD-10-CM

## 2020-06-10 PROCEDURE — 99214 OFFICE O/P EST MOD 30 MIN: CPT | Mod: 95 | Performed by: PHYSICIAN ASSISTANT

## 2020-06-10 RX ORDER — BLOOD-GLUCOSE METER
EACH MISCELLANEOUS
Qty: 300 EACH | Refills: 2 | Status: SHIPPED | OUTPATIENT
Start: 2020-06-10 | End: 2021-05-19 | Stop reason: ALTCHOICE

## 2020-06-10 RX ORDER — BLOOD SUGAR DIAGNOSTIC
STRIP MISCELLANEOUS
Qty: 300 STRIP | Refills: 3 | Status: SHIPPED | OUTPATIENT
Start: 2020-06-10 | End: 2021-04-26

## 2020-06-10 NOTE — PROGRESS NOTES
"Brenda Renee is a 50 year old female who is being evaluated via a billable telephone visit.      The patient has been notified of following:     \"This telephone visit will be conducted via a call between you and your physician/provider. We have found that certain health care needs can be provided without the need for a physical exam.  This service lets us provide the care you need with a short phone conversation.  If a prescription is necessary we can send it directly to your pharmacy.  If lab work is needed we can place an order for that and you can then stop by our lab to have the test done at a later time.    Telephone visits are billed at different rates depending on your insurance coverage. During this emergency period, for some insurers they may be billed the same as an in-person visit.  Please reach out to your insurance provider with any questions.    If during the course of the call the physician/provider feels a telephone visit is not appropriate, you will not be charged for this service.\"    Patient has given verbal consent for Telephone visit?  Yes    What phone number would you like to be contacted at? [phone]    How would you like to obtain your AVS? Mail a copy    Subjective     Brenda Renee is a 50 year old female who presents via phone visit today for the following health issues:    HPI     dicuss lab results        Diabetes Follow-up    How often are you checking your blood sugar? One time daily  What time of day are you checking your blood sugars (select all that apply)?  Before meals  Have you had any blood sugars above 200?  Yes but less than before  Have you had any blood sugars below 70?  No    What symptoms do you notice when your blood sugar is low?  None    What concerns do you have today about your diabetes? None     Do you have any of these symptoms? (Select all that apply)  No numbness or tingling in feet.  No redness, sores or blisters on feet.  No complaints of " excessive thirst.  No reports of blurry vision.  No significant changes to weight.      BP Readings from Last 2 Encounters:   03/04/20 118/70   12/11/19 134/74     Hemoglobin A1C (%)   Date Value   06/01/2020 8.4 (H)   03/02/2020 9.6 (H)     LDL Cholesterol Calculated (mg/dL)   Date Value   09/09/2019 69   08/31/2018 88   Additional provider notes:          Has been on interment cycles of hormone because of heavy periods.  They did an endometrial biopsy which was negative and US showed fibroids.  Currently decreasing with hormones.    Was told this could effect her blood sugar.    Has thickening in heels at the achilles tendon.  Will like to side effects a podiatrist.  May  need letter for working stating she has to stand on cushioned mats.       Recent Labs   Lab Test 06/01/20  1111 03/02/20  1110 12/09/19  1113 09/09/19  1428  12/12/18  1558  08/31/18  0838 07/05/17  1032  05/15/15  0838   A1C 8.4* 9.6* 6.9* 7.1*   < >  --    < > 14.0* 7.0*   < >  --    LDL  --   --   --  69  --   --   --  88 91   < > 80   HDL  --   --   --  43*  --   --   --  48* 42*   < > 53   TRIG  --   --   --  86  --   --   --  134 110   < > 48   ALT  --   --  57*  --   --  63*  --  123* 64*  --  28   CR  --   --  0.72  --   --  0.82  --  0.70 0.85   < > 1.00   GFRESTIMATED  --   --  >90  --   --  74  --  89 71   < > 60*   GFRESTBLACK  --   --  >90  --   --  89  --  >90 86   < > 72   POTASSIUM  --   --  4.1  --   --  4.0  --  4.1 4.1  --  3.6   TSH  --   --   --   --   --   --   --  2.26  --   --  1.61    < > = values in this interval not displayed.      BP Readings from Last 3 Encounters:   03/04/20 118/70   12/11/19 134/74   09/11/19 136/84    Wt Readings from Last 3 Encounters:   03/04/20 110.7 kg (244 lb)   12/13/19 110.2 kg (243 lb)   12/11/19 110.5 kg (243 lb 8 oz)                    Reviewed and updated as needed this visit by Provider  Tobacco  Allergies  Meds  Problems  Med Hx  Surg Hx  Fam Hx         Review of Systems    Constitutional, HEENT, cardiovascular, pulmonary, GI, , musculoskeletal, neuro, skin, endocrine and psych systems are negative, except as otherwise noted.       Objective   Reported vitals:  There were no vitals taken for this visit.   healthy, alert and no distress  Psych: Alert and oriented times 3; coherent speech, normal   rate and volume, able to articulate logical thoughts, able   to abstract reason, no tangential thoughts, no hallucinations   or delusions  Her affect is normal     Diagnostic Test Results:  Labs reviewed in Epic        Assessment/Plan:  1. Type 2 diabetes mellitus with hyperglycemia, without long-term current use of insulin (H)  A1c dropped 1.2% in 3 months, pt would like to see if she can get it drop further on her own before adding a second medication.  Will recheck in July.  Continue checking blood sugar 2-5 times daily.  - Hemoglobin A1c; Future  - blood glucose (ONETOUCH VERIO IQ) test strip; Use to test blood sugar 2-5 times daily or as directed.  Ok to substitute alternative if insurance prefers.  Dispense: 300 strip; Refill: 3  - blood glucose (NutshellMailCAN FINEPOINT) lancets; Use to test blood sugar 2-5 times daily or as directed.  Ok to substitute alternative if insurance prefers.  Dispense: 300 each; Refill: 2    2. Pain in joint, ankle and foot, unspecified laterality  Pain in both ankles and swelling at achilles tendon, will get in with podiatry to see if needs orthotics.  May want referral to [facility] to have these made.  - Orthopedic & Spine  Referral; Future    3. Screening for colon cancer  Due for colonoscopy.  - GASTROENTEROLOGY ADULT REF PROCEDURE ONLY; Future    Will see in Sept if can schedule for annual exam at that time.    A physical exam was not possible today due to the COVID19 pandemic crisis for which we are trying to keep all patients safe and at home.  Clinical decision making was based on history as presented by the patient and answers  to follow up questions as noted above.  Any lab orders that are needed will be put in as future orders so that the patient can come in for a lab only visit to minimize the amount of time spent in the clinic.    Return in about 3 months (around 9/10/2020) for Non-Fasting Lab Only Visit.      Call Start Time: 9:11 AM   Call End Time:  9:25 AM     Phone call duration:  14 minutes      Chasidy Bajwa PA-C    Family Medicine  [facility]

## 2020-07-10 DIAGNOSIS — I10 ESSENTIAL HYPERTENSION WITH GOAL BLOOD PRESSURE LESS THAN 140/90: Chronic | ICD-10-CM

## 2020-07-10 RX ORDER — LOSARTAN POTASSIUM 100 MG/1
100 TABLET ORAL DAILY
Qty: 90 TABLET | Refills: 2 | Status: SHIPPED | OUTPATIENT
Start: 2020-07-10 | End: 2021-03-03

## 2020-09-01 ENCOUNTER — TRANSFERRED RECORDS (OUTPATIENT)
Dept: MULTI SPECIALTY CLINIC | Facility: CLINIC | Age: 50
End: 2020-09-01

## 2020-09-01 LAB — RETINOPATHY: NORMAL

## 2020-09-11 DIAGNOSIS — E11.65 TYPE 2 DIABETES MELLITUS WITH HYPERGLYCEMIA, WITHOUT LONG-TERM CURRENT USE OF INSULIN (H): ICD-10-CM

## 2020-09-11 DIAGNOSIS — M79.18 MYOFASCIAL PAIN: Chronic | ICD-10-CM

## 2020-09-11 DIAGNOSIS — F11.90 CHRONIC, CONTINUOUS USE OF OPIOIDS: ICD-10-CM

## 2020-09-11 DIAGNOSIS — J98.01 ACUTE BRONCHOSPASM: ICD-10-CM

## 2020-09-11 DIAGNOSIS — M54.2 NECK PAIN: Chronic | ICD-10-CM

## 2020-09-11 LAB — HBA1C MFR BLD: 8.4 % (ref 0–5.6)

## 2020-09-11 PROCEDURE — 83036 HEMOGLOBIN GLYCOSYLATED A1C: CPT | Performed by: PHYSICIAN ASSISTANT

## 2020-09-11 PROCEDURE — 36415 COLL VENOUS BLD VENIPUNCTURE: CPT | Performed by: PHYSICIAN ASSISTANT

## 2020-09-11 RX ORDER — HYDROCODONE BITARTRATE AND ACETAMINOPHEN 5; 325 MG/1; MG/1
1 TABLET ORAL EVERY 6 HOURS PRN
Qty: 40 TABLET | Refills: 0 | Status: SHIPPED | OUTPATIENT
Start: 2020-09-11 | End: 2021-01-18

## 2020-09-11 RX ORDER — ALBUTEROL SULFATE 90 UG/1
2 AEROSOL, METERED RESPIRATORY (INHALATION) EVERY 6 HOURS PRN
Qty: 1 INHALER | Refills: 4 | Status: SHIPPED | OUTPATIENT
Start: 2020-09-11 | End: 2022-06-06

## 2020-09-11 RX ORDER — METFORMIN HCL 500 MG
1000 TABLET, EXTENDED RELEASE 24 HR ORAL 2 TIMES DAILY WITH MEALS
Qty: 360 TABLET | Refills: 0 | Status: SHIPPED | OUTPATIENT
Start: 2020-09-11 | End: 2021-01-18
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
## 2020-09-11 NOTE — LETTER
September 14, 2020      Brenda Renee  [address] [place] [address]        Dear ,    We are writing to inform you of your test results.    Your A1c is the same since June so we need to start a new medication.  I have sent a prescription for invokana to your pharmacy.  Let me know if it's too expensive and I'll something else.    Resulted Orders   Hemoglobin A1c   Result Value Ref Range    Hemoglobin A1C 8.4 (H) 0 - 5.6 %      Comment:      Normal <5.7% Prediabetes 5.7-6.4%  Diabetes 6.5% or higher - adopted from ADA   consensus guidelines.         If you have any questions or concerns, please call the clinic at the number listed above.       Sincerely,        [facility]
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
## 2020-09-14 ENCOUNTER — TELEPHONE (OUTPATIENT)
Dept: FAMILY MEDICINE | Facility: CLINIC | Age: 50
End: 2020-09-14

## 2020-09-14 DIAGNOSIS — E11.65 TYPE 2 DIABETES MELLITUS WITH HYPERGLYCEMIA, WITHOUT LONG-TERM CURRENT USE OF INSULIN (H): ICD-10-CM

## 2020-09-14 NOTE — TELEPHONE ENCOUNTER
Patient Contact    Called patient and left detailed VM with instructions that it is okay to wait until she returns to start medications.     No further action needed.

## 2020-09-14 NOTE — TELEPHONE ENCOUNTER
RN called and updated pt to provider message.    1) Pt mentions that she will be doing a 3 week road trip, home on 10/06/20. Is it OK for patient to start on her medications after her road trip? She is concerned about being out of network and starting a new medication.     Ok to LD.    2) Pt also discussed some additional concerns about depression (no plans to harm self or anyone else at this time, just follow up concerns), gynecological issues, podiatry concerns, stress concerns, and colonoscopy questions. Scheduled in for physical on pt return from trip.

## 2020-09-14 NOTE — TELEPHONE ENCOUNTER
----- Message from Chasidy Bajwa PA-C sent at 9/14/2020  7:19 AM CDT -----  Please call pt and inform of the following:    A1c unchanged from June, need to start second medication.  Will start on ivokana.  Let me know if too expensive and will try something else.  Need to recheck in 2 months.

## 2020-09-14 NOTE — TELEPHONE ENCOUNTER
Pt calling, per pt, RX for Invokana is only covered by insurance at a 3 month supply (not a monthly 30 day supply).   RX re-sent to pharmacy for 90 tabs.

## 2020-09-15 ENCOUNTER — TELEPHONE (OUTPATIENT)
Dept: FAMILY MEDICINE | Facility: CLINIC | Age: 50
End: 2020-09-15

## 2020-09-15 DIAGNOSIS — E11.65 TYPE 2 DIABETES MELLITUS WITH HYPERGLYCEMIA, WITHOUT LONG-TERM CURRENT USE OF INSULIN (H): Primary | ICD-10-CM

## 2020-09-15 NOTE — TELEPHONE ENCOUNTER
PA Initiation    Medication: canagliflozin (INVOKANA) 100 MG tablet  Insurance Company: [employer] ([facility]) - Phone [phone] Fax [phone]  Pharmacy Filling the Rx: [id number] [place] [address]  Filling Pharmacy Phone: [phone]  Filling Pharmacy Fax: [phone]  Start Date: 9/15/2020    Brenda Renee ([place]: [id number])

## 2020-09-15 NOTE — TELEPHONE ENCOUNTER
No to all questions.  She allergic to sulfa so I cannot do glipizide etc.   Lab Results   Component Value Date    A1C 8.4 09/11/2020    A1C 8.4 06/01/2020    A1C 9.6 03/02/2020    A1C 6.9 12/09/2019    A1C 7.1 09/09/2019      Currently poorly controlled on maximum dose of metformin.

## 2020-09-15 NOTE — TELEPHONE ENCOUNTER
Prior Authorization Retail Medication Request    Medication/Dose: canagliflozin (INVOKANA) 100 MG tablet  ICD code (if different than what is on RX):     Previously Tried and Failed:     Rationale:       Insurance Name:     Insurance ID:         Pharmacy Information (if different than what is on RX)  Name:     Phone:

## 2020-09-15 NOTE — TELEPHONE ENCOUNTER
Insurance has follow-up questions (I was unable to clarify in her chart).    Does the patient have a diagnosis of diabetic nephropathy with albuminuria more than 300 mg/day?    Are there lab results and clinical documentation confirming a diagnosis of kidney disease?    Please route back to me when finished.

## 2020-09-16 NOTE — TELEPHONE ENCOUNTER
PRIOR AUTHORIZATION DENIED    Medication: canagliflozin (INVOKANA) 100 MG tablet    Denial Date: 9/16/2020    Denial Rational: See below- patient must meet all 3 parts of criteria.      Appeal Information: If provider would like to appeal this decision we will need a detailed letter of medical necessity to start the process. Then re-route this request back to the PA pool.

## 2020-09-21 RX ORDER — PIOGLITAZONEHYDROCHLORIDE 15 MG/1
15 TABLET ORAL DAILY
Qty: 30 TABLET | Refills: 1 | Status: SHIPPED | OUTPATIENT
Start: 2020-09-21 | End: 2021-02-16

## 2020-10-05 DIAGNOSIS — E11.65 TYPE 2 DIABETES MELLITUS WITH HYPERGLYCEMIA, WITHOUT LONG-TERM CURRENT USE OF INSULIN (H): ICD-10-CM

## 2020-10-05 RX ORDER — PIOGLITAZONEHYDROCHLORIDE 15 MG/1
15 TABLET ORAL DAILY
Qty: 30 TABLET | Refills: 1 | OUTPATIENT
Start: 2020-10-05

## 2021-01-01 NOTE — PROGRESS NOTES
[facility]   Intensive Care Daily Progress Note    Name: Amy (Female-Lila Sifuentes       MRN [id number]  Parents:  Yary Sifuentes and Martin Foley  YOB: 2022 10:22 AM  Date of Admission: 2021  ____    History of Present Illness   , appropriate for gestational age, Gestational Age: 28w6d, 2 lb 5.4 oz (1060 g)  female infant, twin A, born due to maternal preeclampsia with renal involvement.  Pregnancy complicated by di - di twin pregnancy, polyhydramnios noted in both twins.  Twin A was noted to have a drastically decreased amount of fluid from previous ultrasound done 2 weeks ago, suggesting possible ROM, however, mother denies fluid leaking. Other maternal concerns include pregnancy induced hypertension and thrombocytopenia.  She was noted to have renal compromise secondary to pre eclampsia and it was determined she should deliver. She received betamethasone on  and an early dose on 9/10 .    Patient Active Problem List   Diagnosis      twin  delivered by  section during current hospitalization, birth weight 1,000-1,249 grams, with 27-28 completed weeks of gestation, with liveborn mate     Low birth weight or  infant, 7403-6398 grams     Respiratory failure of      Need for observation and evaluation of  for sepsis     Malnutrition (H)     Slow feeding in      Hyperbilirubinemia,      Neutropenia (H)     Temperature instability in      Encounter for central line placement     Anemia        Overall Status:    4 day old, , female infant, now at 29w3d PMA.     This patient is critically ill with respiratory failure requiring CPAP.        Vascular Access:  UAC- placed 9/10. Remove   UVC - 9/10-  PICC- RUE, placed .  Retracted multiple times. Now located in good placement over SVC . Obtain AP CXR in am     AGA  - Twin A    FEN:    Vitals:    21 2101 21 1600    SUBJECTIVE:                                                    Brenda Renee is a 47 year old female who presents to clinic today for the following health issues:    ENT Symptoms             Symptoms: cc Present Absent Comment   Fever/Chills   x    Fatigue  x     Muscle Aches   x    Eye Irritation   x    Sneezing   x    Nasal Natan/Drg   x    Sinus Pressure/Pain  x  symptoms for the last 1-2 months.     Loss of smell   x    Dental pain   x    Sore Throat   x    Swollen Glands   x    Ear Pain/Fullness   x ear fullness   Cough  x     Wheeze  x     Chest Pain  x     Shortness of breath  x     Rash   x    Other   x headache, worse when coughing     Symptom duration:  1-2 weeks   Symptom severity:  moderate   Treatments tried:  allegra, vit. C, airborne, has inhaler at home but has not been using.    Contacts:  none     Reviewed and updated as needed this visit by clinical staff  Tobacco  Allergies  Meds  Problems  Med Hx  Surg Hx  Fam Hx  Soc Hx        Reviewed and updated as needed this visit by Provider  Tobacco  Allergies  Meds  Problems  Med Hx  Surg Hx  Fam Hx  Soc Hx        Additional complaints: Bilateral thumb pain, left worse than right, having some trigger pain on left hand.  Unable to hook bra due to pain.      HPI additional notes: Brenda presents today with   Chief Complaint   Patient presents with     URI   Notes being exposed to a lot of allergies last week and having trouble breathing and feeling SOB.  Has some mold in the house and knows she has a serious allergy to mold.  Has albuterol inhaler which she uses prn for wheezing.   Concerned that she might have a fungal infection.  Concerned that she has SOB that is getting worse.  Seems to get a lung infection about once a year.       Allergies to sulfa and azithromycin. Previously on nebulizer.  Worried that she has an autoimmune disorder.       ROS:  C: Negative for fever, chills, recent change in weight  Skin: Negative for  21 1600   Weight: 1.28 kg (2 lb 13.2 oz) 1.01 kg (2 lb 3.6 oz) 1.03 kg (2 lb 4.3 oz)     -3%  Weight change: 0.02 kg (0.7 oz)     140 ml and 110 kcal  Voiding and stooling    Hypoglycemia: Received a 2ml/kg D10 bolus x 1  Recent Labs   Lab 21  0607 21  0617 21  0551 21  0931 21  0920 21  0109   * 79 92 95 90 69       - TF goal 150 ml/kg/day.  - On TPN with GIR of 10 and 2 g IL.     - On enteral feeds with MBM/dBM. Tolerating. Advance feeds according to feeding schedule. Monitor tolerance   -  Mag level 5.2-> 4.5.   - Monitor fluid status, glucose, electrolytes       Respiratory:  Failure requiring CPAP   Currently on CPAP 5, FiO2 21%  - Monitor respiratory status   - Wean as tolerated.       Apnea of Prematurity:    At risk due to PMA <34 weeks.    - On caffeine     Cardiovascular:    Stable - good perfusion and BP.   No murmur present.  Received Indomethacin prophylaxis (started 9/10)  - Obtain CCHD screen.   - Routine CR monitoring.    ID:    Potential for sepsis in the setting of PPROM, unknown length of time.  GBS positive  9/10 BC neg    ANC  9/10- 2.1  - 3.3  - 1.2  - 1.0  - 0.9    On Ampicillin and gentamicin. Cont abx given falling ANC  Obtain CBC in am       - routine IP surveillance tests for MRSA and SARS-CoV-2     CRP Inflammation   Date Value Ref Range Status   2021 <2.9 0.0 - 16.0 mg/L Final     Comment:      reference ranges have not been established.  C-reactive protein values should be interpreted as a comparison of serial measurements.   2021 <2.9 0.0 - 16.0 mg/L Final     Comment:      reference ranges have not been established.  C-reactive protein values should be interpreted as a comparison of serial measurements.   2021 <2.9 0.0 - 16.0 mg/L Final     Comment:      reference ranges have not been established.  C-reactive protein values should be interpreted as a comparison of serial  worrisome rashes or lesions  ENT/MOUTH:POSITIVE for congestion.  Negative for ear pain and sore throat.  Resp: POSITIVE for cough occasionally productive with  SOB and wheezing  CV: Negative for chest pain or peripheral edema  GI: Negative for nausea, abdominal pain, heartburn, or change in bowel habits  MS: Positive for chronic pain  P: Negative for changes in mood or affect  ROS all other systems negative.    Chart Review:  History   Smoking Status     Never Smoker   Smokeless Tobacco     Never Used     PHQ-9 SCORE 5/15/2015   Total Score 5     No flowsheet data found.  PFSH: Personal history of atopy.       Patient Active Problem List   Diagnosis     Plantar fascial fibromatosis     Rectal tear (H)     Neck pain     Myofascial pain     Microhematuria     Morbid obesity due to excess calories (H)     Dysmenorrhea     Perimenopausal     Essential hypertension with goal blood pressure less than 140/90     Abnormal fasting glucose     Bilateral low back pain without sciatica, unspecified chronicity     Chronic pain syndrome     Contact sensitivity to metal, current reaction     Bilateral hand numbness     Seasonal allergic rhinitis, unspecified allergic rhinitis trigger     Bilateral thumb pain     Past Surgical History:   Procedure Laterality Date     BREAST BIOPSY, RT/LT  11/10/2008 Abbott/Piper - negative, 05/26/2010 Abbott/Piper benign    right x 2 benign     COLONOSCOPY  1/7/2013    Procedure: COLONOSCOPY;  Colonoscopy;  Surgeon: Veronica Meyers MD;  Location: [facility]     HC TOOTH EXTRACTION W/FORCEP  1999    wisdom teeth     TONSILLECTOMY  1999     Problem list, Medication list, Allergies, Medical/Social/Surg hx reviewed in [facility], updated as appropriate.   OBJECTIVE:                                                    /78 (BP Location: Right arm, Patient Position: Chair, Cuff Size: Adult Large)  Pulse 99  Temp 98.6  F (37  C) (Tympanic)  Resp 16  Wt 255 lb (115.7 kg)  SpO2 97%  BMI 43.09  kg/m2  Body mass index is 43.09 kg/(m^2).  GENERAL: healthy, alert, in no acute distress  EYES: Grossly normal to inspection, EOMI, PERRL  HENT: Ear canals normal bilateral. TM pearly gray without effusion bilaterally. Nasal mucosa mildly edematous with clear rhinorrhea.  Mouth- mucous membranes moist, no lesions or ulcerations. Pharynx pink. and No tonsillary hypertrophy. Uvula midline, clear post-nasal drainage.  Maxillary and frontal sinuses nontender to palpation bilaterally.  NECK: Non-tender, no adenopathy.  RESP: no rales or rhonchi, expiratory wheezes throughout, prolonged expiratory phase and cough with deep inspiration   CV: regular rate and rhythm, normal S1 S2. No peripheral edema.  MS: Tenderness to palpation of bilateral thenar eminences, normal  strength, FROM of thumbs bilaterally.  SKIN: no suspicious lesions, no rashes  PSYCH: Alert and oriented times 3;  Able to articulate logical thoughts. Affect is normal.    Diagnostic test results: none      ASSESSMENT/PLAN:                                                          ICD-10-CM    1. Acute bronchospasm J98.01 albuterol (ALBUTEROL) 108 (90 BASE) MCG/ACT Inhaler   2. Acute bronchitis, unspecified organism J20.9 doxycycline (VIBRA-TABS) 100 MG tablet     methylPREDNISolone (MEDROL DOSEPAK) 4 MG tablet   3. Seasonal allergic rhinitis, unspecified allergic rhinitis trigger J30.2    4. Neck pain M54.2 HYDROcodone-acetaminophen (NORCO) 5-325 MG per tablet   5. Myofascial pain M79.1 HYDROcodone-acetaminophen (NORCO) 5-325 MG per tablet   6. Bilateral thumb pain M79.645 methylPREDNISolone (MEDROL DOSEPAK) 4 MG tablet    M79.644    7. Wheezing R06.2      Several complaints discussed today.  Pt is again concerned of a possible autoimmune or neurological condition that is causing her URI and MS complaints.    Discussed that she is probably having an allergic response to mold and fungus but in order to a significant fungal infection in her lungs, she would  measurements.         Hematology:   Risk for anemia of prematurity/phlebotomy.    - Monitor hemoglobin and consider darbepoeitin, iron supplementation as indicated  Hemoglobin   Date Value Ref Range Status   2021 (L) 15.0 - 24.0 g/dL Final   2021 (L) 15.0 - 24.0 g/dL Final   2021 (L) 15.0 - 24.0 g/dL Final   2021 13.9 (L) 15.0 - 24.0 g/dL Final     Neutropenia see ID    Platelet Count   Date Value Ref Range Status   2021 218 150 - 450 10e3/uL Final   2021 201 150 - 450 10e3/uL Final   2021 214 150 - 450 10e3/uL Final   2021 217 150 - 450 10e3/uL Final       Renal:   At risk for JAIME due to prematurity.    - monitor UO and Cr   Creatinine   Date Value Ref Range Status   2021 0.33 - 1.01 mg/dL Final   2021 0.33 - 1.01 mg/dL Final   2021 0.33 - 1.01 mg/dL Final   2021 0.96 0.33 - 1.01 mg/dL Final       Jaundice:    At risk for hyperbilirubinemia due to prematurity. Maternal blood type O+. Baby O+, FERNANDO neg   Bilirubin results:  Bilirubin Total   Date Value Ref Range Status   2021 0.0 - 11.7 mg/dL Final   2021 0.0 - 11.7 mg/dL Final   2021 0.0 - 8.2 mg/dL Final   2021 0.0 - 8.2 mg/dL Final   2021 0.0 - 5.8 mg/dL Final     Bilirubin Direct   Date Value Ref Range Status   2021 0.0 - 0.5 mg/dL Final   2021 0.0 - 0.5 mg/dL Final   2021 0.0 - 0.5 mg/dL Final   2021 0.0 - 0.5 mg/dL Final   2021 0.0 - 0.5 mg/dL Final     Off phototherapy . Check level in am    CNS:    Exam WNL At risk for IVH/PVL due to GA 28w6d.   On Indomethacin prophylaxis (started 9/10)  - Obtain screening head ultrasounds on DOL 7 (), (eval for IVH) and ~35-36 wks PMA (eval for PVL)  - Developmental cares per NICU protocol.  - Monitor clinical exam and weekly OFC measurements.      Toxicology:   No maternal risk factors for substance abuse.  Infant does not meet criteria for toxicology screening.     Sedation/ Pain Control:  - Nonpharmacologic comfort measures. Sweetease with painful procedures.    Ophthalmology:   At risk for ROP due to prematurity (<31 weeks Birth GA) OR  very low birth weight (<1500 gm).    - Schedule ROP exam with Peds Ophthalmology per protocol.    Thermoreglation:   - Monitor temperature and provide thermal support as indicated.  - humidity in isolate per protocol    HCM and Discharge Planning:  - Screening tests  indicated PTD:  - [place] 24 [place]- [place]  - Repeat NMS at 14 do   - Final repeat NMS at 30 do   - CCHD screen at 24-48 hr and on RA.  - Hearing screen at/after 35wk GA  - Carseat trial just PTD   - OT input.  - Continue standard NICU cares and family education plan.      Immunizations   - Give Hep B immunization at 21-30 days old (BW <2000 gm) or PTD, whichever comes first.  - plan for Synagis administration during RSV season (<29 wk GA)  There is no immunization history for the selected administration types on file for this patient.       Medications   Current Facility-Administered Medications   Medication     ampicillin 100 mg in NS injection PEDS/NICU     Breast Milk label for barcode scanning 1 Bottle     Breast Milk label for barcode scanning 1 Bottle     caffeine citrate (CAFCIT) injection 10 mg     gentamicin (PF) (GARAMYCIN) injection NICU 3 mg     glycerin (PEDI-LAX) Suppository 0.125 suppository     [START ON 2021] hepatitis b vaccine recombinant (ENGERIX-B) injection 10 mcg     lipids 20% for neonates (Daily dose divided into 2 doses - each infused over 10 hours)     parenteral nutrition -  compounded formula     sodium chloride 0.45% lock flush 0.8 mL     sucrose (SWEET-EASE) solution 0.2-2 mL        Physical Exam    GENERAL: NAD, female infant. Overall appearance c/w CGA.  RESPIRATORY: Chest CTA, no retractions.   CV: RRR, no murmur, strong/sym pulses in UE/LE, good  need to have a severely compromised immune system.  Discussed significant wheezing on exam. She has not been using her inhaler because she did not know if she was supposed to when feeling sick.  Discussed apprpriate use and should increase to q4-6h until her breathing improves.  Will also start on course of prednisone and doxycycline.  Pt concerned she may get a yeast infection like she did on keflex.  Discussed less likely but if she does I can send in a prescription for diflucan.    Discussed if breathing doesn't improve, may need to start on flovent daily.  Discussed using her albuterol appropriately can help prevent more significant respiratory infections.      Unsure of cause of thumb pain, no abnormalities on exam other than tenderness.  She does not have symptoms of CTS.  Will see if prednisone helps.  If not improving, will refer to ortho for further evaluation.  Discussed may need steroid injection if trigger finger worsens on left hand.    Pain medication refilled.  Discussed needs to try to increase physical activity as able to help with weight control.    Please see patient instructions for treatment details.    Follow up in 7-10 days if not improving as anticipated, sooner PRN.    Chasidy Bajwa PA-C  [facility]        perfusion.   ABDOMEN: soft, +BS, no HSM.   CNS: Normal tone for GA. AFOF. MAEE.   Rest of exam unremarkable    Communications   Parents:  Updated  Extended Emergency Contact Information  Primary Emergency Contact: KELSIE HICKS  Mobile Phone: [phone]  Relation: Parent  Secondary Emergency Contact: YARY SIFUENTES  Address: [address] [place] [address] [id number] [place] [address]  Home Phone: [phone]  Mobile Phone: [phone]  Relation: Mother      PCPs:   Infant PCP: Physician No Ref-Primary  undetermined  Maternal OB PCP:  Deyanira Peoples MD  MFM: Payal Jarrett MD  Delivering Provider: Sammy Salas MD      Health Care Team:  Patient discussed with the care team. A/P, imaging studies, laboratory data, medications and family situation reviewed.    Female-Yary Sifuentes was seen and evaluated by me, Shavon Robertson MD, MD .

## 2021-01-18 ENCOUNTER — AMBULATORY - HEALTHEAST (OUTPATIENT)
Dept: FAMILY MEDICINE | Facility: CLINIC | Age: 51
End: 2021-01-18

## 2021-01-18 ENCOUNTER — E-VISIT (OUTPATIENT)
Dept: FAMILY MEDICINE | Facility: CLINIC | Age: 51
End: 2021-01-18
Payer: COMMERCIAL

## 2021-01-18 DIAGNOSIS — M54.2 NECK PAIN: Chronic | ICD-10-CM

## 2021-01-18 DIAGNOSIS — Z20.822 SUSPECTED COVID-19 VIRUS INFECTION: ICD-10-CM

## 2021-01-18 DIAGNOSIS — M79.18 MYOFASCIAL PAIN: Chronic | ICD-10-CM

## 2021-01-18 DIAGNOSIS — Z20.822 SUSPECTED COVID-19 VIRUS INFECTION: Primary | ICD-10-CM

## 2021-01-18 DIAGNOSIS — E11.65 TYPE 2 DIABETES MELLITUS WITH HYPERGLYCEMIA, WITHOUT LONG-TERM CURRENT USE OF INSULIN (H): ICD-10-CM

## 2021-01-18 PROCEDURE — 99421 OL DIG E/M SVC 5-10 MIN: CPT | Performed by: PHYSICIAN ASSISTANT

## 2021-01-18 RX ORDER — METFORMIN HCL 500 MG
1000 TABLET, EXTENDED RELEASE 24 HR ORAL 2 TIMES DAILY WITH MEALS
Qty: 360 TABLET | Refills: 0 | Status: SHIPPED | OUTPATIENT
Start: 2021-01-18 | End: 2021-04-12

## 2021-01-18 RX ORDER — HYDROCODONE BITARTRATE AND ACETAMINOPHEN 5; 325 MG/1; MG/1
1 TABLET ORAL EVERY 6 HOURS PRN
Qty: 40 TABLET | Refills: 0 | Status: SHIPPED | OUTPATIENT
Start: 2021-01-18 | End: 2021-09-01

## 2021-01-18 NOTE — TELEPHONE ENCOUNTER
Pt no showed to her October visit and wasn't able to get in for her DM in December.  She is requesting to schedule her lab and Dm appt in March but wants to schedule her physical in February and discuss hormone testing.    Please advise and send to team to schedule appointments.    Jaswinder Washington RN, BSN

## 2021-01-18 NOTE — PATIENT INSTRUCTIONS
Dear Brenda Renee,    Your symptoms show that you may have coronavirus (COVID-19). This illness can cause fever, cough and trouble breathing. Many people get a mild case and get better on their own. Some people can get very sick.    Will I be tested for COVID-19?  We would like to test you for Covid-19 virus. I have placed orders for this test.     To schedule: go to your [employer] home page and scroll down to the section that says  You have an appointment that needs to be scheduled  and click the large green button that says  Schedule Now  and follow the steps to find the next available openings.    If you are unable to complete these [employer] scheduling steps, please call [phone] to schedule your testing.     Return to work/school/ guidance:  Please let your workplace manager and staffing office know when your quarantine ends     We can t give you an exact date as it depends on the above. You can calculate this on your own or work with your manager/staffing office to calculate this. (For example if you were exposed on 10/4, you would have to quarantine for 14 full days. That would be through 10/18. You could return on 10/19.)      If you receive a positive COVID-19 test result, follow the guidance of the those who are giving you the results. Usually the return to work is 10 (or in some cases 20 days from symptom onset.) If you work at [facility], you must also be cleared by Employee Occupational Health and Safety to return to work.        If you receive a negative COVID-19 test result and did not have a high risk exposure to someone with a known positive COVID-19 test, you can return to work once you're free of fever for 24 hours without fever-reducing medication and your symptoms are improving or resolved.      If you receive a negative COVID-19 test and If you had a high risk exposure to someone who has tested positive for COVID-19 then you can return to work 14 days after your last  contact with the positive individual    Note: If you have ongoing exposure to the covid positive person, this quarantine period may be more than 14 days. (For example, if you are continued to be exposed to your child who tested positive and cannot isolate from them, then the quarantine of 7-14 days can't start until your child is no longer contagious. This is typically 10 days from onset of the child's symptoms. So the total duration may be 17-24 days in this case.)    Sign up for [employer].   We know it's scary to hear that you might have COVID-19. We want to track your symptoms to make sure you're okay over the next 2 weeks. Please look for an email from [employer]--this is a free, online program that we'll use to keep in touch. To sign up, follow the link in the email you will receive. Learn more at [email or web address]    How can I take care of myself?    Get lots of rest. Drink extra fluids (unless a doctor has told you not to)    Take Tylenol (acetaminophen) or ibuprofen for fever or pain. If you have liver or kidney problems, ask your family doctor if it's okay to take Tylenol o ibuprofen    If you have other health problems (like cancer, heart failure, an organ transplant or severe kidney disease): Call your specialty clinic if you don't feel better in the next 2 days.    Know when to call 911. Emergency warning signs include:  o Trouble breathing or shortness of breath  o Pain or pressure in the chest that doesn't go away  o Feeling confused like you haven't felt before, or not being able to wake up  o Bluish-colored lips or face    Where can I get more information?  [facility] - About COVID-19:   [email or web address]    CDC - What to Do If You're Sick:   www.cdc.gov/coronavirus/2019-ncov/about/steps-when-sick.html    Dear Brenda Renee,    Your symptoms show that you may have coronavirus (COVID-19). This illness can cause fever, cough and trouble breathing.  Many people get a mild case and get better on their own. Some people can get very sick.    Will I be tested for COVID-19?  We would like to test you for Covid-19 virus. I have placed orders for this test.     For all employees or close contacts (except [employer] [place] and [employer] - see below), go to your [employer] home page and scroll down to the section that says  You have an appointment that needs to be scheduled  and click the large green button that says  Schedule Now  and follow the steps to find the next available opening.     If you are unable to complete these steps or if you cannot find any available times, please call [phone] to schedule employee testing.     [place] employees or close contacts, please call [phone].   [employer] [employer]) employees or close contacts call [phone].    Return to work/school/ guidance:  Please let your workplace manager and staffing office know when your quarantine ends     We can t give you an exact date as it depends on the above. You can calculate this on your own or work with your manager/staffing office to calculate this. (For example if you were exposed on 10/4, you would have to quarantine for 14 full days. That would be through 10/18. You could return on 10/19.)      If you receive a positive COVID-19 test result, follow the guidance of the those who are giving you the results. Usually the return to work is 10 (or in some cases 20 days from symptom onset.) If you work at [facility], you must also be cleared by Employee Occupational Health and Safety to return to work.        If you receive a negative COVID-19 test result and did not have a high risk exposure to someone with a known positive COVID-19 test, you can return to work once you're free of fever for 24 hours without fever-reducing medication and your symptoms are improving or resolved.      If you receive a negative COVID-19 test and If you had a high risk exposure to someone who has tested  positive for COVID-19 then you can return to work 14 days after your last contact with the positive individual    Note: If you have ongoing exposure to the covid positive person, this quarantine period may be more than 14 days. (For example, if you are continued to be exposed to your child who tested positive and cannot isolate from them, then the quarantine of 7-14 days can't start until your child is no longer contagious. This is typically 10 days from onset of the child's symptoms. So the total duration may be 17-24 days in this case.)    Sign up for [employer].   We know it's scary to hear that you might have COVID-19. We want to track your symptoms to make sure you're okay over the next 2 weeks. Please look for an email from [employer]--this is a free, online program that we'll use to keep in touch. To sign up, follow the link in the email you will receive. Learn more at [email or web address]    How can I take care of myself?    Get lots of rest. Drink extra fluids (unless a doctor has told you not to)    Take Tylenol (acetaminophen) or ibuprofen for fever or pain. If you have liver or kidney problems, ask your family doctor if it's okay to take Tylenol o ibuprofen    If you have other health problems (like cancer, heart failure, an organ transplant or severe kidney disease): Call your specialty clinic if you don't feel better in the next 2 days.    Know when to call 911. Emergency warning signs include:  o Trouble breathing or shortness of breath  o Pain or pressure in the chest that doesn't go away  o Feeling confused like you haven't felt before, or not being able to wake up  o Bluish-colored lips or face    Where can I get more information?   [place] - About COVID-19:   [email or web address]    CDC - What to Do If You're Sick:   www.cdc.gov/coronavirus/2019-ncov/about/steps-when-sick.html

## 2021-01-18 NOTE — TELEPHONE ENCOUNTER
Reason for Call:  Other prescription    Detailed comments:  Patient needs refills    losartan (COZAAR) 100 MG tablet  metFORMIN (GLUCOPHAGE-XR) 500 MG 24 hr tablet  HYDROcodone-acetaminophen (NORCO) 5-325 MG tablet    [facility] pharmacy [facility].          Phone Number Patient can be reached at: Cell number on file:    Telephone Information:   Mobile [phone]       Best Time: Any time    Can we leave a detailed message on this number? YES    Call taken on 1/18/2021 at 11:10 AM by Rocio Bruner

## 2021-01-19 ENCOUNTER — COMMUNICATION - HEALTHEAST (OUTPATIENT)
Dept: FAMILY MEDICINE | Facility: CLINIC | Age: 51
End: 2021-01-19

## 2021-01-19 ENCOUNTER — COMMUNICATION - HEALTHEAST (OUTPATIENT)
Dept: SCHEDULING | Facility: CLINIC | Age: 51
End: 2021-01-19

## 2021-01-21 ENCOUNTER — VIRTUAL VISIT (OUTPATIENT)
Dept: EDUCATION SERVICES | Facility: CLINIC | Age: 51
End: 2021-01-21
Payer: COMMERCIAL

## 2021-01-21 DIAGNOSIS — E11.65 TYPE 2 DIABETES MELLITUS WITH HYPERGLYCEMIA, WITHOUT LONG-TERM CURRENT USE OF INSULIN (H): Primary | ICD-10-CM

## 2021-01-21 PROCEDURE — G0108 DIAB MANAGE TRN  PER INDIV: HCPCS | Mod: 95 | Performed by: NUTRITIONIST

## 2021-01-23 NOTE — PROGRESS NOTES
"Brenda is a 50 year old who is being evaluated via a billable telephone visit.      What phone number would you like to be contacted at? [phone]  How would you like to obtain your AVS? Erinn    Diabetes Self-Management Education & Support    Presents for:  Diabetes education for Type 2 diabetes    SUBJECTIVE/OBJECTIVE:     Cultural Influences/Ethnic Background:  American    Diabetes Symptoms & Complications:          Patient Problem List and Family Medical History reviewed for relevant medical history, current medical status, and diabetes risk factors.    Vitals:  There were no vitals taken for this visit.  Estimated body mass index is 41.88 kg/m  as calculated from the following:    Height as of 3/4/20: 1.626 m (5' 4\").    Weight as of 3/4/20: 110.7 kg (244 lb).   Last 3 BP:   BP Readings from Last 3 Encounters:   03/04/20 118/70   12/11/19 134/74   09/11/19 136/84       History   Smoking Status     Never Smoker   Smokeless Tobacco     Never Used       Labs:  Lab Results   Component Value Date    A1C 8.4 09/11/2020     Lab Results   Component Value Date     12/09/2019     Lab Results   Component Value Date    LDL 69 09/09/2019     HDL Cholesterol   Date Value Ref Range Status   09/09/2019 43 (L) >49 mg/dL Final   ]  GFR Estimate   Date Value Ref Range Status   12/09/2019 >90 >60 mL/min/[1.73_m2] Final     Comment:     Non  GFR Calc  Starting 12/18/2018, serum creatinine based estimated GFR (eGFR) will be   calculated using the Chronic Kidney Disease Epidemiology Collaboration   (CKD-EPI) equation.       GFR Estimate If Black   Date Value Ref Range Status   12/09/2019 >90 >60 mL/min/[1.73_m2] Final     Comment:      GFR Calc  Starting 12/18/2018, serum creatinine based estimated GFR (eGFR) will be   calculated using the Chronic Kidney Disease Epidemiology Collaboration   (CKD-EPI) equation.       Lab Results   Component Value Date    CR 0.72 12/09/2019     No results " "found for: MICROALBUMIN    Healthy Eating:  Patient tells me she had been doing some stress eating this past year and was \"not eating good\". More sugar and carbohydrate. However, recently her appetite is lower. She stopped drinking juice. Not much of a meat eater, so diet is more carb heavy, but recently reduced portions.  Frozen cherries, small bagel with cream cheese, egg salad, rice cakes, kind bar, roast beef with potato and carrots. Drinks javier, water, hot chocolate. Frozen meals have been helpful and convenient.   Currently grazing more/eating smaller portions.     Being Active:  Not discussed today    Monitoring:  Patient is known to me from previous years, when she was first diagnosed with type 2 diabetes and from previous nutrition appointments. I last saw her in 2019 when her A1C was <7%. Since then her A1C has risen. She tells me she has gone through menopause and had a stressful year. She was prescribed new dm medications by her PCP but has not started taking anything. States she wants to learn more about them first. I have scheduled her to follow up with Josiane Dennis RN CDE, to discuss. She misses 1-2 metformin doses per week.   Patient had been wearing a peggy sensor last I saw her, but currently is not and has not been since 2019. She has not been testing her glucose, she tells me she had some difficulty with insurance wanting her to use a mail delivery that she doesn't want to use. She prefers to pick the strips up from a local pharmacy. She will plan to call them to find out her option so she can start testing.  She tested her glucose with old strips this morning and it was 305, however, the strips were  and therefore results unreliable.     Taking Medications:  Diabetes Medication(s)     Biguanides       metFORMIN (GLUCOPHAGE-XR) 500 MG 24 hr tablet    Take 2 tablets (1,000 mg) by mouth 2 times daily (with meals)    Insulin Sensitizing Agents       pioglitazone (ACTOS) 15 MG tablet    Take 1 " tablet (15 mg) by mouth daily          Healthy Coping:     Patient Activation Measure Survey Score:  RUPERTO Score (Last Two) 8/31/2016   RUPERTO Raw Score 32   Activation Score 62.6   RUPERTO Level 3       Diabetes knowledge and skills assessment:     Patient needs further education on the following diabetes management concepts: Healthy Eating, Being Active, Monitoring, Taking Medication and Problem Solving    Based on learning assessment above, most appropriate setting for further diabetes education would be: Individual setting.      INTERVENTIONS:    Education provided today on:  AADE Self-Care Behaviors:  Healthy Eating: consistency in amount, composition, and timing of food intake and general nutrition guidelines  Monitoring: purpose and frequency of monitoring  Problem Solving: when to call health care provider  Reducing Risks: A1C - goals, relating to blood glucose levels, how often to check    Opportunities for ongoing education and support in diabetes-self management were discussed.    Pt verbalized understanding of concepts discussed and recommendations provided today.       Education Materials Provided:  No new materials provided today      ASSESSMENT:    Patient's most recent   Lab Results   Component Value Date    A1C 8.4 09/11/2020    is not meeting goal of <7.0    PLAN  See Marnie for co-developed, patient-stated behavior change goals.    Time Spent: 60 minutes  Encounter Type: Individual    Any diabetes medication dose changes were made via the CDE Protocol and Collaborative Practice Agreement with the patient's referring provider. A copy of this encounter was shared with the provider.

## 2021-01-27 ENCOUNTER — VIRTUAL VISIT (OUTPATIENT)
Dept: EDUCATION SERVICES | Facility: CLINIC | Age: 51
End: 2021-01-27
Payer: COMMERCIAL

## 2021-01-27 DIAGNOSIS — E11.65 TYPE 2 DIABETES MELLITUS WITH HYPERGLYCEMIA, WITHOUT LONG-TERM CURRENT USE OF INSULIN (H): Primary | ICD-10-CM

## 2021-01-27 PROCEDURE — 99207 PR NO BILLABLE SERVICE THIS VISIT: CPT | Mod: TEL

## 2021-01-27 NOTE — CONFIDENTIAL NOTE
DIABETES EDUCATION NOTE:    Brenda Renee presents today for education related to Type 2 diabetes.    Patient is being treated with:  oral agents, diet and SMBG  She is accompanied by self    PATIENT CONCERNS RELATED TO DIABETES SELF MANAGEMENT: avoiding long-term damage from diabetes    ASSESSMENT: type 2 diabetes, blood sugar above target    Current Diabetes Management per Patient:  Taking diabetes medications?   yes:     Diabetes Medication(s)     Biguanides       metFORMIN (GLUCOPHAGE-XR) 500 MG 24 hr tablet    Take 2 tablets (1,000 mg) by mouth 2 times daily (with meals)    Insulin Sensitizing Agents       pioglitazone (ACTOS) 15 MG tablet    Take 1 tablet (15 mg) by mouth daily        Monitoring  Patient glucose self monitoring as follows: randomly.   BG results: 160, 303, 180    BG values are: Not in goal  Patient's most recent   Lab Results   Component Value Date    A1C 8.4 09/11/2020    is not meeting goal of <7.0    Activity: very little right now, has Covid    Nutrition:  No appetite right now, has lost 10 pounds since her diagnosis of Covid    Hypoglycemia:   Patient is at risk of hypoglycemia? No     Healthy Coping and Stress Management:  Covid has led her to a place where she desires to be healthier and take care of her blood sugar                   EDUCATION and INSTRUCTION PROVIDED AT THIS VISIT:    We discussed her blood sugars, most recent a1c and how the medication that she is currently taking work to decrease her blood sugar.  She has not started the Actos which was prescribed last fall.  She does not like to take medication until she explores all options and has a clear understanding of how they work.  Teaching was done on that today.    She does not like the idea of the weight gain/swelling which can occur with Actos.  Explained this is a low dose.  She would like to try Jardiance.  It looks like Invokana ordered last fall and was not covered by insurance.       Teaching done on the  progressive nature of type 2 diabetes and she is asked to work on lifestyle changes post Covid.  More activity strongly encouraged.    Patient-stated goal written and given to Brenda Renee.  Verbalized and demonstrated understanding of instructions.     PLAN:  See patient instructions  AVS printed and given to patient    FOLLOW-UP:    March 5 at 1:30 pm  Time spent with patient at today's visit was 43 minutes.      Any diabetes medication dose changes were made via the CDE Protocol and Collaborative Practice Agreement with Bartley and  Angely.  A copy of this encounter was provided to patient's referring provider.

## 2021-01-27 NOTE — Clinical Note
Brenda Chapa has follow up with you 2/17/21.  You added Actos 15 mg daily which she never took.  She would like to go on Jardiance.  Her daily numbers have been up as high as 303 (only 3 numbers to report).  Will you order this for her prior to her appointment in a couple weeks?  If you let me know, I will get back to her.  She sees me again in March.  Let me know.  Thanks!  Josiane

## 2021-01-30 ENCOUNTER — MYC MEDICAL ADVICE (OUTPATIENT)
Dept: FAMILY MEDICINE | Facility: CLINIC | Age: 51
End: 2021-01-30

## 2021-02-15 NOTE — PROGRESS NOTES
Pre-Visit Planning   Next 5 appointments (look out 90 days)    Feb 17, 2021 11:30 AM  (Arrive by 11:05 AM)  Annual Wellness Visit with LEONORA [facility] ([facility] ) [address] [facility] [address] [phone]   Mar 03, 2021 11:40 AM  (Arrive by 11:25 AM)  Office Visit with LEONORA [facility] ([facility] ) [address] [facility] [address] [phone]        Appointment Notes for this encounter:   Physical discuss lab work   Please do PHQ9 -     Questionnaires Reviewed/Assigned  PHQ 2 done - 2 \     My Chart PVP- Are there any additional questions or concerns you'd like to review with your provider during your visit? Yes: recent microscopic blood in urine, COVID f/u issues and menstrual concerns.    Visit is preventive. Reviewed purpose of preventive visit with patient.  She is aware that may be charge is discuss some of the above issues     Meds  Is there anything on your medication list that needs to be updated? Yes: has not started Actos due to cost and added baby ASA due to clot concerns with COVID   Current Outpatient Medications   Medication     albuterol (PROAIR HFA) 108 (90 Base) MCG/ACT inhaler     blood glucose (LIFESCAN FINEPOINT) lancets     blood glucose (ONETOUCH VERIO IQ) test strip     Cholecalciferol (VITAMIN D) 2000 UNITS CAPS     Continuous Blood Gluc Sensor (FREESTYLE INESSA 14 DAY SENSOR) MISC     cyclobenzaprine (FLEXERIL) 10 MG tablet     empagliflozin (JARDIANCE) 10 MG TABS tablet     fexofenadine (ALLEGRA) 180 MG tablet     folic acid (FOLVITE) 400 MCG tablet     HYDROcodone-acetaminophen (NORCO) 5-325 MG tablet     ibuprofen 200 MG capsule     LIDODERM 5 % patch     losartan (COZAAR) 100 MG tablet     MAGNESIUM GLYCINATE PLUS PO     metFORMIN (GLUCOPHAGE-XR) 500 MG 24 hr tablet     Multiple Vitamin  "(MULTI-VITAMIN DAILY) TABS     Ostomy Supplies (SKIN TAC ADHESIVE BARRIER WIPE) MISC     pioglitazone (ACTOS) 15 MG tablet     No current facility-administered medications for this visit.      Do you need refills on any of your medications? No  Health Maintenance Due   Topic Date Due     ANNUAL REVIEW OF HM ORDERS  1970     TREATMENT AGREEMENT FOR CHRONIC PAIN MANAGEMENT  1970     HIV SCREENING  02/01/1984     HEPATITIS C SCREENING  02/01/1987     HEPATITIS B IMMUNIZATION (1 of 3 - Risk 3-dose series) 02/01/1988     PREVENTIVE CARE VISIT  08/31/2019     DIABETIC FOOT EXAM  10/10/2019     EYE EXAM  08/29/2020     LIPID  09/09/2020     MICROALBUMIN  09/11/2020     BMP  12/09/2020     MAMMO SCREENING  01/03/2021     Patient is due for:  see lara   discuss at visit     MyChart  Patient is active on [employer].  PAL introduction sent       Call Summary  \"Thank you for your time today.      Penelope Hernandez RN, [facility]   Phone [phone]   Fax [phone]          "

## 2021-02-17 ENCOUNTER — OFFICE VISIT (OUTPATIENT)
Dept: FAMILY MEDICINE | Facility: CLINIC | Age: 51
End: 2021-02-17
Payer: COMMERCIAL

## 2021-02-17 VITALS
DIASTOLIC BLOOD PRESSURE: 78 MMHG | TEMPERATURE: 97.9 F | HEIGHT: 65 IN | SYSTOLIC BLOOD PRESSURE: 130 MMHG | BODY MASS INDEX: 37.82 KG/M2 | OXYGEN SATURATION: 99 % | WEIGHT: 227 LBS | HEART RATE: 80 BPM

## 2021-02-17 DIAGNOSIS — E66.01 MORBID OBESITY DUE TO EXCESS CALORIES (H): ICD-10-CM

## 2021-02-17 DIAGNOSIS — F11.90 CHRONIC, CONTINUOUS USE OF OPIOIDS: ICD-10-CM

## 2021-02-17 DIAGNOSIS — Z00.00 ROUTINE HISTORY AND PHYSICAL EXAMINATION OF ADULT: Primary | ICD-10-CM

## 2021-02-17 DIAGNOSIS — Z71.89 ADVANCED CARE PLANNING/COUNSELING DISCUSSION: ICD-10-CM

## 2021-02-17 DIAGNOSIS — E11.65 TYPE 2 DIABETES MELLITUS WITH HYPERGLYCEMIA, WITHOUT LONG-TERM CURRENT USE OF INSULIN (H): ICD-10-CM

## 2021-02-17 LAB
ALBUMIN UR-MCNC: NEGATIVE MG/DL
APPEARANCE UR: CLEAR
BILIRUB UR QL STRIP: NEGATIVE
COLOR UR AUTO: YELLOW
CRP SERPL-MCNC: 21 MG/L (ref 0–8)
ERYTHROCYTE [DISTWIDTH] IN BLOOD BY AUTOMATED COUNT: 18.2 % (ref 10–15)
ERYTHROCYTE [SEDIMENTATION RATE] IN BLOOD BY WESTERGREN METHOD: 32 MM/H (ref 0–30)
FSH SERPL-ACNC: 99.2 IU/L
GLUCOSE UR STRIP-MCNC: >=1000 MG/DL
HBA1C MFR BLD: 12.3 % (ref 0–5.6)
HCT VFR BLD AUTO: 35.4 % (ref 35–47)
HGB BLD-MCNC: 10.8 G/DL (ref 11.7–15.7)
HGB UR QL STRIP: NEGATIVE
KETONES UR STRIP-MCNC: NEGATIVE MG/DL
LEUKOCYTE ESTERASE UR QL STRIP: NEGATIVE
LH SERPL-ACNC: 63 IU/L
MCH RBC QN AUTO: 24.2 PG (ref 26.5–33)
MCHC RBC AUTO-ENTMCNC: 30.5 G/DL (ref 31.5–36.5)
MCV RBC AUTO: 79 FL (ref 78–100)
NITRATE UR QL: NEGATIVE
PH UR STRIP: 6.5 PH (ref 5–7)
PLATELET # BLD AUTO: 227 10E9/L (ref 150–450)
RBC # BLD AUTO: 4.46 10E12/L (ref 3.8–5.2)
SOURCE: ABNORMAL
SP GR UR STRIP: 1.01 (ref 1–1.03)
UROBILINOGEN UR STRIP-ACNC: 0.2 EU/DL (ref 0.2–1)
VIT B12 SERPL-MCNC: 608 PG/ML (ref 193–986)
WBC # BLD AUTO: 9 10E9/L (ref 4–11)

## 2021-02-17 PROCEDURE — 83036 HEMOGLOBIN GLYCOSYLATED A1C: CPT | Performed by: PHYSICIAN ASSISTANT

## 2021-02-17 PROCEDURE — 84443 ASSAY THYROID STIM HORMONE: CPT | Performed by: PHYSICIAN ASSISTANT

## 2021-02-17 PROCEDURE — 85027 COMPLETE CBC AUTOMATED: CPT | Performed by: PHYSICIAN ASSISTANT

## 2021-02-17 PROCEDURE — 82306 VITAMIN D 25 HYDROXY: CPT | Performed by: PHYSICIAN ASSISTANT

## 2021-02-17 PROCEDURE — 99207 ZZC FOOT EXAM  NO CHARGE: CPT | Mod: 25 | Performed by: PHYSICIAN ASSISTANT

## 2021-02-17 PROCEDURE — 86140 C-REACTIVE PROTEIN: CPT | Performed by: PHYSICIAN ASSISTANT

## 2021-02-17 PROCEDURE — 82607 VITAMIN B-12: CPT | Performed by: PHYSICIAN ASSISTANT

## 2021-02-17 PROCEDURE — 99000 SPECIMEN HANDLING OFFICE-LAB: CPT | Performed by: PHYSICIAN ASSISTANT

## 2021-02-17 PROCEDURE — 82043 UR ALBUMIN QUANTITATIVE: CPT | Performed by: PHYSICIAN ASSISTANT

## 2021-02-17 PROCEDURE — 80061 LIPID PANEL: CPT | Performed by: PHYSICIAN ASSISTANT

## 2021-02-17 PROCEDURE — 85652 RBC SED RATE AUTOMATED: CPT | Performed by: PHYSICIAN ASSISTANT

## 2021-02-17 PROCEDURE — 83001 ASSAY OF GONADOTROPIN (FSH): CPT | Performed by: PHYSICIAN ASSISTANT

## 2021-02-17 PROCEDURE — 80307 DRUG TEST PRSMV CHEM ANLYZR: CPT | Mod: 90 | Performed by: PHYSICIAN ASSISTANT

## 2021-02-17 PROCEDURE — 80053 COMPREHEN METABOLIC PANEL: CPT | Performed by: PHYSICIAN ASSISTANT

## 2021-02-17 PROCEDURE — 99396 PREV VISIT EST AGE 40-64: CPT | Performed by: PHYSICIAN ASSISTANT

## 2021-02-17 PROCEDURE — 83002 ASSAY OF GONADOTROPIN (LH): CPT | Performed by: PHYSICIAN ASSISTANT

## 2021-02-17 PROCEDURE — 36415 COLL VENOUS BLD VENIPUNCTURE: CPT | Performed by: PHYSICIAN ASSISTANT

## 2021-02-17 PROCEDURE — 81003 URINALYSIS AUTO W/O SCOPE: CPT | Performed by: PHYSICIAN ASSISTANT

## 2021-02-17 RX ORDER — ASPIRIN 81 MG/1
81 TABLET, CHEWABLE ORAL DAILY
COMMUNITY
End: 2022-09-09

## 2021-02-17 ASSESSMENT — ENCOUNTER SYMPTOMS
HEADACHES: 1
DYSURIA: 0
FREQUENCY: 1
HEMATOCHEZIA: 0
SORE THROAT: 0
FEVER: 0
DIARRHEA: 1
MYALGIAS: 1
COUGH: 1
HEMATURIA: 0
ARTHRALGIAS: 1
NAUSEA: 0
JOINT SWELLING: 0
PALPITATIONS: 0
EYE PAIN: 1
NERVOUS/ANXIOUS: 0
WEAKNESS: 1
ABDOMINAL PAIN: 0
CHILLS: 0
HEARTBURN: 0
DIZZINESS: 0
PARESTHESIAS: 1
CONSTIPATION: 0
SHORTNESS OF BREATH: 1

## 2021-02-17 ASSESSMENT — ANXIETY QUESTIONNAIRES
7. FEELING AFRAID AS IF SOMETHING AWFUL MIGHT HAPPEN: MORE THAN HALF THE DAYS
2. NOT BEING ABLE TO STOP OR CONTROL WORRYING: MORE THAN HALF THE DAYS
GAD7 TOTAL SCORE: 10
1. FEELING NERVOUS, ANXIOUS, OR ON EDGE: MORE THAN HALF THE DAYS
GAD7 TOTAL SCORE: 10
7. FEELING AFRAID AS IF SOMETHING AWFUL MIGHT HAPPEN: MORE THAN HALF THE DAYS
5. BEING SO RESTLESS THAT IT IS HARD TO SIT STILL: NOT AT ALL
3. WORRYING TOO MUCH ABOUT DIFFERENT THINGS: MORE THAN HALF THE DAYS
GAD7 TOTAL SCORE: 10
6. BECOMING EASILY ANNOYED OR IRRITABLE: NOT AT ALL
4. TROUBLE RELAXING: MORE THAN HALF THE DAYS

## 2021-02-17 ASSESSMENT — PATIENT HEALTH QUESTIONNAIRE - PHQ9
SUM OF ALL RESPONSES TO PHQ QUESTIONS 1-9: 14
SUM OF ALL RESPONSES TO PHQ QUESTIONS 1-9: 14
10. IF YOU CHECKED OFF ANY PROBLEMS, HOW DIFFICULT HAVE THESE PROBLEMS MADE IT FOR YOU TO DO YOUR WORK, TAKE CARE OF THINGS AT HOME, OR GET ALONG WITH OTHER PEOPLE: VERY DIFFICULT

## 2021-02-17 ASSESSMENT — MIFFLIN-ST. JEOR: SCORE: 1632.61

## 2021-02-17 NOTE — LETTER
[facility]  02/16/21    Patient: Brenda Renee  YOB: 1970  Medical Record Number: [id number]                                                                  Opioid / Opioid Plus Controlled Substance Agreement    I understand that my care provider has prescribed an opioid (narcotic) controlled substance to help manage my condition(s). I am taking this medicine to help me function or work. I know this is strong medicine, and that it can cause serious side effects. Opioid medicine can be sedating, addicting and may cause a dependency on the drug. They can affect my ability to drive or think, and cause depression. They need to be taken exactly as prescribed. Combining opioids with certain medicines or chemicals (such as cocaine, sedatives and tranquilizers, sleeping pills, meth) can be dangerous or even fatal. Also, if I stop opioids suddenly, I may have severe withdrawal symptoms. Last, I understand that opioids do not work for all types of pain nor for all patients. If not helpful, I may be asked to stop them.      The risks, benefits, and side effects of these medicine(s) were explained to me. I agree that:    1. I will take part in other treatments as advised by my care team. This may be psychiatry or counseling, physical therapy, behavioral therapy, group treatment or a referral to a pain clinic. I will reduce or stop my medicine when my care team tells me to do so.  2. I will take my medicines as prescribed. I will not change the dose or schedule unless my care team tells me to. There will be no refills if I  run out early.   I may be contactedwithout warning and asked to complete a urine drug test or pill count at any time.   3. I will keep all my appointments, and understand this is part of the monitoring of opioids. My care team may require an office visit for EVERY opioid/controlled substance refill. If I miss appointments or don t follow instructions, my care team  may stop my medicine.  4. I will not ask other providers to prescribe controlled substances, and I will not accept controlled substances from other people. If I need another prescribed controlled substance for a new reason, I will tell my care team within 1 business day.  5. I will use one pharmacy to fill all of my controlled substance prescriptions, and it is up to me to make sure that I do not run out of my medicines on weekends or holidays. If my care team is willing to refill my opioid prescription without a visit, I must request refills only during office hours, refills may take up to 3 days to process, and it may take up to 5 to 7 days for my medicine to be mailed and ready at my pharmacy. Prescriptions will not be mailed anywhere except my pharmacy.        [id number]  Rev 12/18         Registration to scan to EHR                             Page 1 of 2               Controlled Substance Agreement [facility]  02/16/21  Patient: Brenda Renee  YOB: 1970  Medical Record Number: [id number]                                                                  6. I am responsible for my prescriptions. If the medicine/prescription is lost or stolen, it will not be replaced. I also agree not to share controlled substance medicines with anyone.  7. I agree to not use ANY illegal or recreational drugs. This includes marijuana, cocaine, bath salts or other drugs. I agree not to use alcohol unless my care team says I may.          I agree to give urine samples whenever asked. If I don t give a urine sample, the care team may stop my medicine.    8. If I enroll in the [place] Medical Marijuana program, I will tell my care team. I will also sign an agreement to share my medical records with my care team.   9. I will bring in my list of medicines (or my medicine bottles) each time I come to the clinic.   10. I will tell my care team right away if I become pregnant or have a  new medical problem treated outside of my regular clinic.  11. I understand that this medicine can affect my thinking and judgment. It may be unsafe for me to drive, use machinery and do dangerous tasks. I will not do any of these things until I know how the medicine affects me. If my dose changes, I will wait to see how it affects me. I will contact my care team if I have concerns about medicine side effects.    I understand that if I do not follow any of the conditions above, my prescriptions or treatment may be stopped.      I agree that my provider, clinic care team, and pharmacy may work with any city, state or federal law enforcement agency that investigates the misuse, sale, or other diversion of my controlled medicine. I will allow my provider to discuss my care with or share a copy of this agreement with any other treating provider, pharmacy or emergency room where I receive care. I agree to give up (waive) any right of privacy or confidentiality with respect to these consents.     I have read this agreement and have asked questions about anything I did not understand.      ________________________________________________________________________  Patient signature - Date/Time -  Brenda Renee                                      ________________________________________________________________________  Witness signature                                                            ________________________________________________________________________  Provider signature - Chasidy Bajwa PA-C      [id number]  Rev 12/18         Registration to scan to EHR                         Page 2 of 2                   Controlled Substance Agreement Opioid

## 2021-02-17 NOTE — PROGRESS NOTES
SUBJECTIVE:   CC: Brenda Renee is an 51 year old woman who presents for preventive health visit.     Patient has been advised of split billing requirements and indicates understanding: Yes  Healthy Habits:     Getting at least 3 servings of Calcium per day:  Yes    Bi-annual eye exam:  Yes    Dental care twice a year:  Yes    Sleep apnea or symptoms of sleep apnea:  None    Diet:  Diabetic    Frequency of exercise:  None    Taking medications regularly:  No    Barriers to taking medications:  Cost of medication, Problems remembering to take them, Side effects and Other    Medication side effects:  Other    PHQ-2 Total Score: 2    Additional concerns today:  No    Still having covid symptoms, was diagnosed in January.  Has also had problems with cough, congestion, loss of sense of taste, body aches.  Very sick.  Sleeping on the cough, taking ibuprofen. Blurred vision    Intermittent periods.  Had a vaginal US and biopsy, positive for fibroids.  Spotting for the last few months.  Has been out of work from the 14th of Jan.  Started feeling sick on Jan 15th.    Has been taking hydrocodone periodically.  Has not needed this week.  Has been taking more ibuprofen.      Blood sugars are in the upper side of normal, in the upper 190-200s.      Saw ob/gyn in May, will see again this spring.  Dry skin and hair loss.       Has been taking vitamin D.    Sed rate, crp, joint pains    memory problems    Hasn't been to PT due to covid      Today's PHQ-2 Score:   PHQ-2 ( 1999 Pf[employer]) 2/17/2021   Q1: Little interest or pleasure in doing things 1   Q2: Feeling down, depressed or hopeless 1   PHQ-2 Score 2   Q1: Little interest or pleasure in doing things Several days   Q2: Feeling down, depressed or hopeless Several days   PHQ-2 Score 2       Abuse: Current or Past (Physical, Sexual or Emotional) - No  Do you feel safe in your environment? Yes    Have you ever done Advance Care Planning? (For example, a Health Directive,  POLST, or a discussion with a medical provider or your loved ones about your wishes): No, advance care planning information given to patient to review.  Advanced care planning was discussed at today's visit.    Social History     Tobacco Use     Smoking status: Never Smoker     Smokeless tobacco: Never Used   Substance Use Topics     Alcohol use: Not Currently     If you drink alcohol do you typically have >3 drinks per day or >7 drinks per week? No    Alcohol Use 2/17/2021   Prescreen: >3 drinks/day or >7 drinks/week? No   Prescreen: >3 drinks/day or >7 drinks/week? -       Any new diagnosis of family breast, ovarian, or bowel cancer? No     Reviewed orders with patient.  Reviewed health maintenance and updated orders accordingly - Yes  BP Readings from Last 3 Encounters:   02/17/21 130/78   03/04/20 118/70   12/11/19 134/74    Wt Readings from Last 3 Encounters:   02/17/21 103 kg (227 lb)   03/04/20 110.7 kg (244 lb)   12/13/19 110.2 kg (243 lb)             Recent Labs   Lab Test 09/11/20  1519 06/01/20  1111 03/02/20  1110 12/09/19  1113 09/09/19  1428 12/12/18  1558 12/12/18  1558 08/31/18  0838 08/31/18  0838 07/05/17  1032 05/15/15  0838 05/15/15  0838   A1C 8.4* 8.4* 9.6* 6.9* 7.1*   < >  --    < > 14.0* 7.0*   < >  --    LDL  --   --   --   --  69  --   --   --  88 91   < > 80   HDL  --   --   --   --  43*  --   --   --  48* 42*   < > 53   TRIG  --   --   --   --  86  --   --   --  134 110   < > 48   ALT  --   --   --  57*  --   --  63*  --  123* 64*  --  28   CR  --   --   --  0.72  --   --  0.82  --  0.70 0.85   < > 1.00   GFRESTIMATED  --   --   --  >90  --   --  74  --  89 71   < > 60*   GFRESTBLACK  --   --   --  >90  --   --  89  --  >90 86   < > 72   POTASSIUM  --   --   --  4.1  --   --  4.0  --  4.1 4.1  --  3.6   TSH  --   --   --   --   --   --   --   --  2.26  --   --  1.61    < > = values in this interval not displayed.      Breast CA Risk Screening:  No flowsheet data found.    Mammogram  "Screening: Recommended annual mammography  Pertinent mammograms are reviewed under the imaging tab.    History of abnormal Pap smear: NO - age 30-65 PAP every 5 years with negative HPV co-testing recommended     Reviewed and updated as needed this visit by clinical staff  Tobacco  Allergies  Meds  Problems  Med Hx  Surg Hx  Fam Hx  Soc Hx          Reviewed and updated as needed this visit by Provider  Tobacco  Allergies  Meds  Problems  Med Hx  Surg Hx  Fam Hx             Review of Systems   Constitutional: Negative for chills and fever.   HENT: Positive for congestion. Negative for ear pain, hearing loss and sore throat.    Eyes: Positive for pain and visual disturbance.   Respiratory: Positive for cough and shortness of breath.    Cardiovascular: Negative for chest pain, palpitations and peripheral edema.   Gastrointestinal: Positive for diarrhea. Negative for abdominal pain, constipation, heartburn, hematochezia and nausea.   Genitourinary: Positive for frequency and urgency. Negative for dysuria, genital sores and hematuria.   Musculoskeletal: Positive for arthralgias and myalgias. Negative for joint swelling.   Skin: Negative for rash.   Neurological: Positive for weakness, headaches and paresthesias. Negative for dizziness.   Psychiatric/Behavioral: Negative for mood changes. The patient is not nervous/anxious.         OBJECTIVE:   /78 (BP Location: Right arm, Patient Position: Chair, Cuff Size: Adult Large)   Pulse 80   Temp 97.9  F (36.6  C) (Oral)   Ht 1.638 m (5' 4.5\")   Wt 103 kg (227 lb)   SpO2 99%   BMI 38.36 kg/m    Physical Exam  GENERAL: healthy, alert and no distress  EYES: Eyes grossly normal to inspection, PERRL and conjunctivae and sclerae normal  HENT: ear canals and TM's normal, nose and mouth without ulcers or lesions  NECK: no adenopathy, no asymmetry, masses, or scars and thyroid normal to palpation  RESP: lungs clear to auscultation - no rales, rhonchi or " wheezes  CV: regular rate and rhythm, normal S1 S2, no S3 or S4, no murmur, click or rub, no peripheral edema and peripheral pulses strong  ABDOMEN: soft, nontender, no hepatosplenomegaly, no masses and bowel sounds normal  MS: no gross musculoskeletal defects noted, no edema  SKIN: no suspicious lesions or rashes  NEURO: Normal strength and tone, mentation intact and speech normal  PSYCH: mentation appears normal, affect normal/bright    Diagnostic Test Results:  Labs reviewed in Epic    ASSESSMENT/PLAN:       ICD-10-CM    1. Routine history and physical examination of adult  Z00.00 TSH with free T4 reflex     *UA reflex to Microscopic and Culture ([facility] (except [place] and [place])     Follicle stimulating hormone     Lutropin     Comprehensive metabolic panel     Vitamin D Deficiency     Vitamin B12     CBC with platelets     CRP inflammation     Erythrocyte sedimentation rate auto     CANCELED: UA with Microscopic reflex to Culture   2. Type 2 diabetes mellitus with hyperglycemia, without long-term current use of insulin (H)  E11.65 Hemoglobin A1c     Albumin Random Urine Quantitative with Creat Ratio     ZZC FOOT EXAM  NO CHARGE     Lipid panel reflex to direct LDL Non-fasting     CANCELED: Lipid panel reflex to direct LDL Fasting     CANCELED: Basic metabolic panel   3. Morbid obesity due to excess calories (H)  E66.01    4. Chronic, continuous use of opioids  F11.90 Drug  Screen Comprehensive , Urine with Reported Meds ([facility]) (Pain Care Package)   5. Advanced care planning/counseling discussion  Z71.89    CSA updated today.  Lots of labs requested.  Will follow up with results with pt when they return.  She has had some irregular periods and had a vaginal US that showed fibroids.  Discussed bleeding should stop as she hits menopause.  Will get A1c checked today.  Has not started jardance because it was too expensive.  Is allergic to sulfa and would not start actos due to side  "effects.  She has had a 20 lb weight loss and is trying to eat healthy so hopefully her blood sugars have improved.    Mood is poor because of recent covid infection for herself and boyfriend (who was very sick and almost had to go on a ventilator).  She has been out of work since mid January and is unsure if she wants to go back.    Has been having a lot of joint pain since covid, has not needed to use her hydrocodone or flexeril this week so her UDS should be negative.    Patient has been advised of split billing requirements and indicates understanding: Yes  COUNSELING:  Reviewed preventive health counseling, as reflected in patient instructions    Estimated body mass index is 38.36 kg/m  as calculated from the following:    Height as of this encounter: 1.638 m (5' 4.5\").    Weight as of this encounter: 103 kg (227 lb).    Weight management plan: Discussed healthy diet and exercise guidelines    She reports that she has never smoked. She has never used smokeless tobacco.      Counseling Resources:  ATP IV Guidelines  Pooled Cohorts Equation Calculator  Breast Cancer Risk Calculator  BRCA-Related Cancer Risk Assessment: FHS-7 Tool  FRAX Risk Assessment  ICSI Preventive Guidelines  Dietary Guidelines for Americans, 2010  USDA's MyPlate  ASA Prophylaxis  Lung CA Screening    Chasidy Bajwa PA-C  [facility]  Answers for HPI/ROS submitted by the patient on 2/17/2021   Annual Exam:  If you checked off any problems, how difficult have these problems made it for you to do your work, take care of things at home, or get along with other people?: Very difficult  PHQ9 TOTAL SCORE: 14  TITO 7 TOTAL SCORE: 10    "

## 2021-02-18 ENCOUNTER — TELEPHONE (OUTPATIENT)
Dept: FAMILY MEDICINE | Facility: CLINIC | Age: 51
End: 2021-02-18

## 2021-02-18 LAB
ALBUMIN SERPL-MCNC: 3.4 G/DL (ref 3.4–5)
ALP SERPL-CCNC: 107 U/L (ref 40–150)
ALT SERPL W P-5'-P-CCNC: 97 U/L (ref 0–50)
ANION GAP SERPL CALCULATED.3IONS-SCNC: 8 MMOL/L (ref 3–14)
AST SERPL W P-5'-P-CCNC: 75 U/L (ref 0–45)
BILIRUB SERPL-MCNC: 0.3 MG/DL (ref 0.2–1.3)
BUN SERPL-MCNC: 14 MG/DL (ref 7–30)
CALCIUM SERPL-MCNC: 10.1 MG/DL (ref 8.5–10.1)
CHLORIDE SERPL-SCNC: 98 MMOL/L (ref 94–109)
CHOLEST SERPL-MCNC: 160 MG/DL
CO2 SERPL-SCNC: 27 MMOL/L (ref 20–32)
CREAT SERPL-MCNC: 0.78 MG/DL (ref 0.52–1.04)
CREAT UR-MCNC: 33 MG/DL
DEPRECATED CALCIDIOL+CALCIFEROL SERPL-MC: >155 UG/L (ref 20–75)
GFR SERPL CREATININE-BSD FRML MDRD: 88 ML/MIN/{1.73_M2}
GLUCOSE SERPL-MCNC: 362 MG/DL (ref 70–99)
HDLC SERPL-MCNC: 53 MG/DL
LDLC SERPL CALC-MCNC: 86 MG/DL
MICROALBUMIN UR-MCNC: <5 MG/L
MICROALBUMIN/CREAT UR: NORMAL MG/G CR (ref 0–25)
NONHDLC SERPL-MCNC: 107 MG/DL
POTASSIUM SERPL-SCNC: 4.8 MMOL/L (ref 3.4–5.3)
PROT SERPL-MCNC: 8 G/DL (ref 6.8–8.8)
SODIUM SERPL-SCNC: 133 MMOL/L (ref 133–144)
TRIGL SERPL-MCNC: 103 MG/DL
TSH SERPL DL<=0.005 MIU/L-ACNC: 1.71 MU/L (ref 0.4–4)

## 2021-02-18 ASSESSMENT — PATIENT HEALTH QUESTIONNAIRE - PHQ9: SUM OF ALL RESPONSES TO PHQ QUESTIONS 1-9: 14

## 2021-02-18 ASSESSMENT — ANXIETY QUESTIONNAIRES: GAD7 TOTAL SCORE: 10

## 2021-02-18 NOTE — TELEPHONE ENCOUNTER
[facility] and sent [employer] message    Zeenat Loja RN Flex    ----- Message from Chasidy Bajwa PA-C sent at 2/18/2021  9:38 AM CST -----  Please call pt:    Not all  labs are back yet but A1c is very high, over 12, which is why I think she feels so unwell.  I think it would be a good idea to start once daily long acting insulin.  It will not be expensive and it will help her feel better.  I think that is also why her inflammation marker is high.    She needs to decrease her vitamin D intake, it is more than sufficient.    Recommend starting a once daily iron supplement.  B12 is normal.  There was no blood in her urine.  Her labs are in the menopausal range.     Will also send hard copy in the mail.

## 2021-02-19 LAB — PAIN DRUG SCR UR W RPTD MEDS: NORMAL

## 2021-03-01 NOTE — PROGRESS NOTES
Pre-Visit Planning     Appointment Notes for this encounter:  follow up on labs    Questionnaires Reviewed/Assigned   No additional questionnaires are needed     Patient contact not needed.       Penelope Hernandez RN, [facility]   Phone [phone]   Fax [phone]

## 2021-03-02 NOTE — PATIENT INSTRUCTIONS
Patient Education     Eating a Vegetarian Diet  A vegetarian diet is based on plant foods. It includes fruits, vegetables, beans, grains, seeds, and nuts. Some vegetarians also eat dairy foods and eggs. There are 3 common vegetarian diets:    Lacto-ovo vegetarians eat eggs, yogurt, cheese, and other milk products, as well as plant foods.    Lacto vegetarians eat dairy and plant foods but not eggs.    Vegans eat only plant foods.  Why eat vegetarian?  People choose to be vegetarians for health, cultural, social, and Alevism reasons. A vegetarian diet is a healthy way to eat. You just have to plan your meals carefully so that you get all the nutrients you need. Most vegetarian diets are high in fiber and low in fat and cholesterol. That means eating vegetarian can:    Lower your risk of heart disease    Lower your blood pressure and cholesterol levels    Help you stay at a healthy weight    Lower your risk of diabetes    Lower your risk of cancer    Decrease digestive problems including:  ? Bowel diseases  ? Gallstones  ? Colon cancer  Vegetarian basics  A vegetarian diet can be a healthy way to eat for people of all ages. But meals and snacks must be planned to include non-meat sources of protein, vitamins, and other nutrients. (See the chart below.) Here are some guidelines for healthy meal planning:    Eat a wide range of foods. This will help you get all the nutrients you need.    Eat a number of plant proteins throughout the day.    Plan for enough calories each day. Also make sure that your calories come from foods that are rich in protein, vitamins, and minerals.    If you eat dairy foods, choose low-fat or fat-free milk, yogurt, or cheese.  Do you need supplements?  A vegetarian diet can easily supply all the calories, protein, vitamins, and minerals that a person needs. But some people have special needs. They may include children and teens, pregnant and lactating women, women past midlife, the elderly,  and vegans. If you are in one of these groups, you may need extra calories, protein, calcium, iron, vitamin B-12, vitamin D, or zinc. The lists below can help you choose foods that are good sources of these nutrients. And be sure to ask your healthcare provider about taking vitamin supplements.  Protein    Dried beans, soybeans, and lentils    Tofu (bean curd) and tempeh (cultured soybeans)    Rice, barley, and other whole grains    Nuts and nut butter    Milk, yogurt, and cheese    Eggs    Casein    Seitan (also called wheat gluten) Vitamin B-12    Milk, yogurt, and cheese    Eggs    Fortified soy burgers    Fortified soy milk or other nondairy milk    Fortified cereals    Nutritional yeast   Zinc    Milk, yogurt, and cheese    Eggs    Canned or dried beans    Lentils and split peas    Wheat germ    Whole-grain breads and cereals    Nuts and nut butters    Pumpkin and sunflower seeds Calcium    Milk, yogurt, and cheese    Fortified soy milk or other nondairy milk    Tofu processed with calcium sulfate    Leafy, dark-green vegetables    Dried figs    Fortified orange juice and fortified cereals    Sesame seeds    Beans   Iron    Wheat germ    Dried fruits    Nuts and seeds    Whole grain and fortified breads and cereals    Dried beans, lentils, and split peas    Leafy, dark-green vegetables    Eggs Vitamin D    Eggs    Mushrooms treated with ultraviolet light    Fortified soy milk, cow's milk, orange juice and ready-to-eat cereals   Getting started  Change to a vegetarian diet slowly. Start by eating more grains, beans, vegetables, and fruits. Make fish, poultry, or meat a side dish. Then slowly cut them out of your diet. Here are some other tips:    Eat 3 or more servings of vegetables a day. Eat them raw or lightly steamed.    Eat 2 or more servings of fruit a day. Choose whole fruits with the skin on.    Choose a wide range of grains and whole-grain breads and cereals. Eat 6 or more servings of these foods each  day.    Begin to replace meat by working up to 2 to 3 servings a day of beans, lentils, split peas, tofu, or tempeh.    If you eat dairy foods, have 2 to 3 servings a day. Make low-fat or fat-free choices.  For vegans: Add sources of calcium and vitamin B12, such as fortified nondairy milks and breakfast cereals. Talk with your healthcare provider about vitamin supplements.  To learn more  A registered dietitian (RD) can help you plan a healthy vegetarian diet. For more information and to find an RD who knows about vegetarian diets, search for one through the Vegetarian Nutrition Dietetic Practice Group of the Academy of Nutrition and Dietetics (AND) at their website, www.vegetariannutrition.net. You can also search AND's website, www.eatright.org. Other groups that can help include:    Vegetarian Resource Group ( www.vrg.org)    American Cancer Society ( www.cancer.org)    American Heart Association ( www.heart.org)  Ayana last reviewed this educational content on 10/1/2019    8272-0235 [employer]. All rights reserved. This information is not intended as a substitute for professional medical care. Always follow your healthcare professional's instructions.

## 2021-03-03 ENCOUNTER — TELEPHONE (OUTPATIENT)
Dept: FAMILY MEDICINE | Facility: CLINIC | Age: 51
End: 2021-03-03

## 2021-03-03 ENCOUNTER — OFFICE VISIT (OUTPATIENT)
Dept: FAMILY MEDICINE | Facility: CLINIC | Age: 51
End: 2021-03-03
Payer: COMMERCIAL

## 2021-03-03 VITALS
DIASTOLIC BLOOD PRESSURE: 78 MMHG | WEIGHT: 227.9 LBS | TEMPERATURE: 98.4 F | OXYGEN SATURATION: 97 % | SYSTOLIC BLOOD PRESSURE: 130 MMHG | HEART RATE: 95 BPM | BODY MASS INDEX: 37.97 KG/M2 | HEIGHT: 65 IN

## 2021-03-03 DIAGNOSIS — I10 ESSENTIAL HYPERTENSION WITH GOAL BLOOD PRESSURE LESS THAN 140/90: Chronic | ICD-10-CM

## 2021-03-03 DIAGNOSIS — E66.01 MORBID OBESITY DUE TO EXCESS CALORIES (H): ICD-10-CM

## 2021-03-03 DIAGNOSIS — E11.65 TYPE 2 DIABETES MELLITUS WITH HYPERGLYCEMIA, WITHOUT LONG-TERM CURRENT USE OF INSULIN (H): Primary | ICD-10-CM

## 2021-03-03 DIAGNOSIS — D50.9 IRON DEFICIENCY ANEMIA, UNSPECIFIED IRON DEFICIENCY ANEMIA TYPE: ICD-10-CM

## 2021-03-03 LAB — HBA1C MFR BLD: 11.7 % (ref 0–5.6)

## 2021-03-03 PROCEDURE — 36415 COLL VENOUS BLD VENIPUNCTURE: CPT | Performed by: PHYSICIAN ASSISTANT

## 2021-03-03 PROCEDURE — 83036 HEMOGLOBIN GLYCOSYLATED A1C: CPT | Performed by: PHYSICIAN ASSISTANT

## 2021-03-03 PROCEDURE — 99215 OFFICE O/P EST HI 40 MIN: CPT | Performed by: PHYSICIAN ASSISTANT

## 2021-03-03 RX ORDER — FLASH GLUCOSE SENSOR
1 KIT MISCELLANEOUS
Qty: 2 EACH | Refills: 11 | Status: SHIPPED | OUTPATIENT
Start: 2021-03-03 | End: 2021-09-01

## 2021-03-03 RX ORDER — FERROUS SULFATE 325(65) MG
325 TABLET ORAL EVERY OTHER DAY
Qty: 45 TABLET | Refills: 2 | Status: CANCELLED | OUTPATIENT
Start: 2021-03-03

## 2021-03-03 RX ORDER — LOSARTAN POTASSIUM 100 MG/1
100 TABLET ORAL DAILY
Qty: 90 TABLET | Refills: 2 | Status: SHIPPED | OUTPATIENT
Start: 2021-03-03 | End: 2021-12-08

## 2021-03-03 RX ORDER — PIOGLITAZONEHYDROCHLORIDE 30 MG/1
30 TABLET ORAL DAILY
Qty: 90 TABLET | Refills: 1 | Status: SHIPPED | OUTPATIENT
Start: 2021-03-03 | End: 2021-09-01

## 2021-03-03 ASSESSMENT — MIFFLIN-ST. JEOR: SCORE: 1636.69

## 2021-03-03 ASSESSMENT — PATIENT HEALTH QUESTIONNAIRE - PHQ9
SUM OF ALL RESPONSES TO PHQ QUESTIONS 1-9: 12
10. IF YOU CHECKED OFF ANY PROBLEMS, HOW DIFFICULT HAVE THESE PROBLEMS MADE IT FOR YOU TO DO YOUR WORK, TAKE CARE OF THINGS AT HOME, OR GET ALONG WITH OTHER PEOPLE: VERY DIFFICULT
SUM OF ALL RESPONSES TO PHQ QUESTIONS 1-9: 12

## 2021-03-03 NOTE — TELEPHONE ENCOUNTER
PA Initiation    Medication: Continuous Blood Gluc Sensor (FREESTYLE INESSA 14 DAY SENSOR) [facility]   Insurance Company: [employer] ([facility]) - Phone [phone] Fax [phone]  Pharmacy Filling the Rx: [id number] [place] [facility] [place] [address]  Filling Pharmacy Phone: [phone]  Filling Pharmacy Fax: [phone]  Start Date: 3/3/2021

## 2021-03-03 NOTE — PROGRESS NOTES
Assessment & Plan     Type 2 diabetes mellitus with hyperglycemia, without long-term current use of insulin (H)  Discussed diabetes at length.  Pt does not want to do something with needles.  Will see what cost is for trulicity as this would have a lower side effects profile than the actos.  She will otherwise start actos.  Would like to get her A1c checked today.  All labs reviewed today.  Will see if insurance will cover continuous glucose monitor.  - dulaglutide (TRULICITY) 0.75 MG/0.5ML pen; Inject 0.75 mg Subcutaneous every 7 days  - pioglitazone (ACTOS) 30 MG tablet; Take 1 tablet (30 mg) by mouth daily  - Hemoglobin A1c  - insulin pen needle (32G X 4 MM) 32G X 4 MM miscellaneous; Use 1 pen needles weekly or as directed.  - Continuous Blood Gluc Sensor ([employer]YLE INESSA 14 DAY SENSOR) MISC; 1 kit every 14 days  - **A1C FUTURE anytime; Future    Morbid obesity due to excess calories (H)  Working on increasing exercise.    Iron deficiency anemia, unspecified iron deficiency anemia type  Discussed diet modifications    Essential hypertension with goal blood pressure less than 140/90    - losartan (COZAAR) 100 MG tablet; Take 1 tablet (100 mg) by mouth daily      I spent a total of 40 minutes on the day of the visit.       Depression Screening Follow Up    PHQ 3/3/2021   PHQ-9 Total Score 12   Q9: Thoughts of better off dead/self-harm past 2 weeks Not at all     Last PHQ-9 3/3/2021   1.  Little interest or pleasure in doing things 2   2.  Feeling down, depressed, or hopeless 2   3.  Trouble falling or staying asleep, or sleeping too much 2   4.  Feeling tired or having little energy 3   5.  Poor appetite or overeating 2   6.  Feeling bad about yourself 0   7.  Trouble concentrating 1   8.  Moving slowly or restless 0   Q9: Thoughts of better off dead/self-harm past 2 weeks 0   PHQ-9 Total Score 12       Follow Up Actions Taken  Patient counseled, no additional follow up at this time.         Return in about 3  months (around 6/3/2021) for Med Check.    Chasidy Bajwa PA-C  [facility] SHANELL Gutierrez is a 51 year old who presents for the following health issues     History of Present Illness       Diabetes:   She presents for follow up of diabetes.  She is checking home blood glucose one time daily. She checks blood glucose before meals, after meals, before and after meals and at bedtime.  Blood glucose is sometimes over 200 and never under 70. She is aware of hypoglycemia symptoms including other. She is concerned about blood sugar frequently over 200. She is having numbness in feet and burning in feet. The patient has had a diabetic eye exam in the last 12 months. Eye exam performed on Summer 2020. [facility]        She eats 2-3 servings of fruits and vegetables daily.She consumes 3 sweetened beverage(s) daily.She exercises with enough effort to increase her heart rate 9 or less minutes per day.  She exercises with enough effort to increase her heart rate 3 or less days per week.   She is taking medications regularly.       Wt Readings from Last 5 Encounters:   03/03/21 103.4 kg (227 lb 14.4 oz)   02/17/21 103 kg (227 lb)   03/04/20 110.7 kg (244 lb)   12/13/19 110.2 kg (243 lb)   12/11/19 110.5 kg (243 lb 8 oz)     Lab Results   Component Value Date    A1C 12.3 02/17/2021    A1C 8.4 09/11/2020    A1C 8.4 06/01/2020    A1C 9.6 03/02/2020    A1C 6.9 12/09/2019     Recent Labs   Lab Test 02/17/21  1211 09/09/19  1428 05/15/15  0838 05/15/15  0838 04/18/14  0908   CHOL 160 130   < > 143 154   HDL 53 43*   < > 53 61   LDL 86 69   < > 80 83   TRIG 103 86   < > 48 50   CHOLHDLRATIO  --   --   --  2.7 2.5    < > = values in this interval not displayed.     BP Readings from Last 6 Encounters:   03/03/21 130/78   02/17/21 130/78   03/04/20 118/70   12/11/19 134/74   09/11/19 136/84   06/12/19 132/80     Difficult pt, on  metformin, has seen diabetic educator.  Has not significant changed diet but has lost weight.  Allergic to sulfa.  Refuses to go on actos.  Cannot afford jardance.  Sent multiple messages to try and get her started on long acting insulin for which she didn't respond.      Review of Systems   Constitutional, HEENT, cardiovascular, pulmonary, gi and gu systems are negative, except as otherwise noted.      Objective    There were no vitals taken for this visit.  There is no height or weight on file to calculate BMI.  Physical Exam   GENERAL: healthy, alert and no distress  SKIN: no suspicious lesions or rashes  PSYCH: mentation appears normal, affect normal/bright, tangential, anxious                Answers for HPI/ROS submitted by the patient on 3/3/2021   Chronic problems general questions HPI Form  If you checked off any problems, how difficult have these problems made it for you to do your work, take care of things at home, or get along with other people?: Very difficult  PHQ9 TOTAL SCORE: 12

## 2021-03-03 NOTE — NURSING NOTE
Future Appointments   Date Time Provider Department Center   3/3/2021 11:20 AM Chasidy Bajwa PA-C [facility]     Appointment Notes for this encounter:   follow up on labs    Health Maintenance Due   Topic Date Due     ANNUAL REVIEW OF HM ORDERS  1970     TREATMENT AGREEMENT FOR CHRONIC PAIN MANAGEMENT  1970     HEPATITIS B IMMUNIZATION (1 of 3 - Risk 3-dose series) 02/01/1988     EYE EXAM  08/29/2020     MAMMO SCREENING  01/03/2021     Health Maintenance addressed:  Mammogram and Eye Exam    Mammogram [facility]   and Eye Exam Pt agrees to schedule appt today    MyChart Status:  Active and Using

## 2021-03-03 NOTE — TELEPHONE ENCOUNTER
PRIOR AUTHORIZATION DENIED    Medication: Continuous Blood Gluc Sensor (FREESTYLE INESSA 14 DAY SENSOR) MISC--DENIED    Denial Date: 3/3/2021    Denial Rational: Patient needs to be injecting insulin 3 or more times a day.     Appeal Information:

## 2021-03-03 NOTE — TELEPHONE ENCOUNTER
Prior Authorization Retail Medication Request    Medication/Dose: Continuous Blood Gluc Sensor (FREESTYLE INESSA 14 DAY SENSOR) MISC  ICD code (if different than what is on RX):  Type 2 diabetes mellitus with hyperglycemia, without long-term current use of insulin (H) (E11.65)  Previously Tried and Failed:    Rationale:      Insurance Name: [employer]  Insurance ID: [id number]      Pharmacy Information (if different than what is on RX)  Name: [facility] [address] [place] [address]  Phone: [phone]

## 2021-03-04 ASSESSMENT — PATIENT HEALTH QUESTIONNAIRE - PHQ9: SUM OF ALL RESPONSES TO PHQ QUESTIONS 1-9: 12

## 2021-03-04 NOTE — RESULT ENCOUNTER NOTE
Carl Gutierrez,    I just wanted to let you know that your lab results have been reviewed and are attached.    We're going the right direction but still high.  Will recheck in 3 months.    Please let me know if you have any questions and have a great week!    Sincerely,    Julienne Bajwa PA-C    [facility]  [address] [place] [address]  Clinic Phone: [phone]

## 2021-03-14 ENCOUNTER — HEALTH MAINTENANCE LETTER (OUTPATIENT)
Age: 51
End: 2021-03-14

## 2021-03-17 ENCOUNTER — MYC MEDICAL ADVICE (OUTPATIENT)
Dept: FAMILY MEDICINE | Facility: CLINIC | Age: 51
End: 2021-03-17

## 2021-04-09 ENCOUNTER — IMMUNIZATION (OUTPATIENT)
Dept: NURSING | Facility: CLINIC | Age: 51
End: 2021-04-09
Payer: COMMERCIAL

## 2021-04-09 PROCEDURE — 0001A PR COVID VAC PFIZER DIL RECON 30 MCG/0.3 ML IM: CPT

## 2021-04-09 PROCEDURE — 91300 PR COVID VAC PFIZER DIL RECON 30 MCG/0.3 ML IM: CPT

## 2021-04-10 DIAGNOSIS — E11.65 TYPE 2 DIABETES MELLITUS WITH HYPERGLYCEMIA, WITHOUT LONG-TERM CURRENT USE OF INSULIN (H): ICD-10-CM

## 2021-04-12 RX ORDER — METFORMIN HCL 500 MG
TABLET, EXTENDED RELEASE 24 HR ORAL
Qty: 360 TABLET | Refills: 0 | Status: SHIPPED | OUTPATIENT
Start: 2021-04-12 | End: 2021-07-05

## 2021-04-12 NOTE — TELEPHONE ENCOUNTER
Prescription approved per [facility] Refill Protocol.  Jaswinder Washington RN, BSN

## 2021-04-22 ENCOUNTER — TELEPHONE (OUTPATIENT)
Dept: FAMILY MEDICINE | Facility: CLINIC | Age: 51
End: 2021-04-22

## 2021-04-26 ENCOUNTER — TELEPHONE (OUTPATIENT)
Dept: FAMILY MEDICINE | Facility: CLINIC | Age: 51
End: 2021-04-26

## 2021-04-26 NOTE — TELEPHONE ENCOUNTER
Called to pt she will start Actos today.   She did start Trulicity back in March.     Appt changed for 40 min.     Penelope Hernandez RN

## 2021-04-26 NOTE — TELEPHONE ENCOUNTER
Did she start trulicity?  She needs to start the actos.  We did discuss this at her last visit, my opinion on the matter hasn't changed.

## 2021-04-26 NOTE — TELEPHONE ENCOUNTER
Called to pt to schedule f/u in June    While on phone pt reported that her BS have improved but still average is 200.   Occasional reading at around 100 over night and has had a couple at around 400.  She states the 400 where likely because she did not eat well those days.     Med rec done - pt never started Actos - if to stay off do you want to remove from med list.  If you think she needs to stat this she would like to discuss at June visit     She is NOT taking, folic acid, mult vit or Vit D- do you want to remove these form med list?    Penelope Hernandez, RN

## 2021-04-27 DIAGNOSIS — E11.65 TYPE 2 DIABETES MELLITUS WITH HYPERGLYCEMIA, WITHOUT LONG-TERM CURRENT USE OF INSULIN (H): ICD-10-CM

## 2021-04-27 RX ORDER — DULAGLUTIDE 0.75 MG/.5ML
INJECTION, SOLUTION SUBCUTANEOUS
Qty: 2 ML | Refills: 1 | Status: SHIPPED | OUTPATIENT
Start: 2021-04-27 | End: 2021-05-06

## 2021-04-30 ENCOUNTER — IMMUNIZATION (OUTPATIENT)
Dept: NURSING | Facility: CLINIC | Age: 51
End: 2021-04-30
Attending: INTERNAL MEDICINE
Payer: COMMERCIAL

## 2021-04-30 PROCEDURE — 0002A PR COVID VAC PFIZER DIL RECON 30 MCG/0.3 ML IM: CPT

## 2021-04-30 PROCEDURE — 91300 PR COVID VAC PFIZER DIL RECON 30 MCG/0.3 ML IM: CPT

## 2021-05-06 RX ORDER — DULAGLUTIDE 0.75 MG/.5ML
0.75 INJECTION, SOLUTION SUBCUTANEOUS
Qty: 6 ML | Refills: 0 | Status: SHIPPED | OUTPATIENT
Start: 2021-05-06 | End: 2021-07-05

## 2021-05-06 NOTE — TELEPHONE ENCOUNTER
Received call from patient informing that her insurance is requiring a 90 day fill vs. a 30 day. RN called and spoke with [facility] pharmacy, they verified that insurance is requiring 90 day script. Re-wrote script for 90 day fill per Pharmacy instructions and sent in per protocol. Called and informed patient of sent script. No further questions or concerns at this time.     Cliff MUNOZ RN

## 2021-05-19 ENCOUNTER — TELEPHONE (OUTPATIENT)
Dept: FAMILY MEDICINE | Facility: CLINIC | Age: 51
End: 2021-05-19

## 2021-05-19 DIAGNOSIS — E11.65 TYPE 2 DIABETES MELLITUS WITH HYPERGLYCEMIA, WITHOUT LONG-TERM CURRENT USE OF INSULIN (H): Primary | ICD-10-CM

## 2021-05-19 NOTE — TELEPHONE ENCOUNTER
Pt calling needing RF for testing supplies       She is also asking to have hormone labs from Feb faxed to outside provider -  She will call back with that number     Penelope Hernandez RN

## 2021-05-20 ENCOUNTER — MYC MEDICAL ADVICE (OUTPATIENT)
Dept: FAMILY MEDICINE | Facility: CLINIC | Age: 51
End: 2021-05-20

## 2021-05-21 ENCOUNTER — MYC MEDICAL ADVICE (OUTPATIENT)
Dept: FAMILY MEDICINE | Facility: CLINIC | Age: 51
End: 2021-05-21

## 2021-05-21 DIAGNOSIS — E11.65 TYPE 2 DIABETES MELLITUS WITH HYPERGLYCEMIA, WITHOUT LONG-TERM CURRENT USE OF INSULIN (H): ICD-10-CM

## 2021-05-21 NOTE — TELEPHONE ENCOUNTER
Per pharmacy pt needs to use mail order for 90 day supply on [employer] supplies     Pt is not using the sensor as she has bug bite on her arm.     She states she also has issues getting the sensor to stick.       Pt states she like to test at least 3-5 X day as she still has some low BS.       PAL suggested pt discuss testing with PCP at visit on 6/3    On call with pt for 20 min     Penelope Hernandez RN

## 2021-05-24 NOTE — TELEPHONE ENCOUNTER
Pt LM on PAL VM states that she need lancets sent with sig for testing 3 X day.     She does NOT want to use her sensor for testing at this time as she does not feel it is as accurate.     Sent corrected RX for both lancets and test strips.     Penelope Hernandez RN

## 2021-05-25 ENCOUNTER — TELEPHONE (OUTPATIENT)
Dept: FAMILY MEDICINE | Facility: CLINIC | Age: 51
End: 2021-05-25

## 2021-05-25 NOTE — TELEPHONE ENCOUNTER
Prior Authorization Retail Medication Request    Medication/Dose: blood glucose (NO BRAND SPECIFIED) test strip  ICD code (if different than what is on RX):    Previously Tried and Failed:  None  Rationale: Patient needs these test strips to check her blood sugar levels. Patient has been using test strips since 09/24/2018.     Insurance Name:  [employer]  Insurance ID:  [id number]      Pharmacy Information (if different than what is on RX)  Name:    Phone:        Corona LANDEROS

## 2021-05-25 NOTE — TELEPHONE ENCOUNTER
Central Prior Authorization Team   Phone: [phone]      PA Initiation    Medication: blood glucose (ONETOUCH VERIO) test strip - INITIATED  Insurance Company: [employer] ([place]) - Phone [phone] Fax [phone]  [id number] [place], [place] [facility] [address] [facility] [address]  Filling Pharmacy Phone: [phone]  Filling Pharmacy Fax: [phone]  Start Date: 5/25/2021

## 2021-05-26 ENCOUNTER — MYC MEDICAL ADVICE (OUTPATIENT)
Dept: FAMILY MEDICINE | Facility: CLINIC | Age: 51
End: 2021-05-26

## 2021-05-26 NOTE — TELEPHONE ENCOUNTER
Acute Bronchitis   WHAT YOU SHOULD KNOW:   Acute bronchitis is swelling and irritation in the air passages of your lungs  This irritation may cause you to cough or have other breathing problems  Acute bronchitis often starts because of another viral illness, such as a cold or the flu  The illness spreads from your nose and throat to your windpipe and airways  Bronchitis is often called a chest cold  Acute bronchitis lasts about 2 weeks and is usually not a serious illness  AFTER YOU LEAVE:   Medicines:   · Ibuprofen or acetaminophen:  These medicines help lower a fever  They are available without a doctor's order  Ask your healthcare provider which medicine is right for you  Ask how much to take and how often to take it  Follow directions  These medicines can cause stomach bleeding if not taken correctly  Ibuprofen can cause kidney damage  Do not take ibuprofen if you have kidney disease, an ulcer, or allergies to aspirin  Acetaminophen can cause liver damage  Do not drink alcohol if you take acetaminophen  · Cough medicine: This medicine helps loosen mucus in your lungs and make it easier to cough up  This can help you breathe easier  · Inhalers: You may need one or more inhalers to help you breathe easier and cough less  An inhaler gives your medicine in a mist form so that you can breathe it into your lungs  Ask your healthcare provider to show you how to use your inhaler correctly  · Steroid medicine:  Steroid medicine helps open your air passages so you can breathe easier  · Take your medicine as directed  Call your healthcare provider if you think your medicine is not helping or if you have side effects  Tell him if you are allergic to any medicine  Keep a list of the medicines, vitamins, and herbs you take  Include the amounts, and when and why you take them  Bring the list or the pill bottles to follow-up visits  Carry your medicine list with you in case of an emergency    How to use an See my chart-      .Penelope Hernandez, RN      inhaler:   · Shake the inhaler well to make sure you get the correct amount of medicine per puff  Remove the cover from your inhaler's mouthpiece  If you are using a spacer, connect your inhaler to the flat end of the spacer  · Exhale as much air from your lungs as you can  Put the mouthpiece in your mouth past your front teeth and rest it on the top of your tongue  Do not block the mouthpiece opening with your tongue  · Breathe in through your mouth at a slow and steady rate  As you do this, press the inhaler to release the puff of medicine  Finish breathing in slowly and deeply as you inhale the medicine  When your lungs are full, hold your breath for 10 seconds  Then breathe out slowly through puckered lips or through your nose  · If you need to take more puffs, wait at least 1 minute between each puff  · Rinse your mouth with water after you use the inhaler  This may keep you from getting a mouth infection or irritation  · Follow the instructions that come with your inhaler to clean it  You should clean your inhaler at least once a week  Ways to care for yourself:   · Avoid alcohol:  Alcohol dulls your urge to cough and sneeze  When you have bronchitis, you need to be able to cough and sneeze to clear your air passages  Alcohol also causes your body to lose fluid  This can make the mucus in your lungs thicker and harder to cough up  · Avoid irritants in the air:  Do not smoke or allow others to smoke around you  Avoid chemicals, fumes, and dust  Wear a face mask if you must work around dust or fumes  Stay inside on days when air pollution levels are high  If you have allergies, stay inside when pollen counts are high  Avoid aerosol products  This includes spray-on deodorant, bug spray, and hair spray  · Drink more liquids:  Most people should drink at least 8 eight-ounce cups of water a day  You may need to drink more liquids when you have acute bronchitis   Liquids help keep your air passages moist and help you cough up mucus  · Get more rest:  You may feel like resting more  Slowly start to do more each day  Rest when you feel it is needed  · Eat healthy foods:  Eat a variety healthy foods every day  Your diet should include fruits, vegetables, breads, and protein (such as chicken, fish, and beans)  Dairy products (such as milk, cheese, and ice cream) can sometimes increase the amount of mucus your body makes  Ask if you should decrease your intake of dairy products  · Use a humidifier:  Use a cool mist humidifier to increase air moisture in your home  This may make it easier for you to breathe and help decrease your cough  Decrease your risk of acute bronchitis:   · Get the vaccinations you need:  Ask your healthcare provider if you should get vaccinated against the flu or pneumonia  · Avoid things that may irritate your lungs:  Stay inside or cover your mouth and nose with a scarf when you are outside during cold weather  You should also stay inside on days when air pollution levels are high  If you have allergies, stay inside when pollen counts are high  Avoid using aerosol products in your home  This includes spray-on deodorant, bug spray, and hair spray  · Avoid the spread of germs:        [facility] [place] [facility] your hands often with soap and water  Carry germ-killing gel with you  You can use the gel to clean your hands when there is no soap and water available  ¨ Do not touch your eyes, nose, or mouth unless you have washed your hands first     ¨ Always cover your mouth when you cough  Cough into a tissue or your shirtsleeve so you do not spread germs from your hands  ¨ Try to avoid people who have a cold or the flu  If you are sick, stay away from others as much as possible  Follow up with your healthcare provider as directed:  Write down questions you have so you will remember to ask them during your follow-up visits    Contact your healthcare provider if:   · You have a fever     · Your skin becomes itchy or you have a rash after you take your medicine  · Your breathing problems do not go away or get worse  · Your cough does not get better with treatment  · You cough up blood  · You have questions or concerns about your condition or care  Seek care immediately or call 911 if:   · You faint  · Your lips or fingernails turn blue  · You feel like you are not getting enough air when you breathe  · You have swelling of your lips, tongue, or throat that makes it hard to breathe or swallow  © 2014 [address] [employer] is for End User's use only and may not be sold, redistributed or otherwise used for commercial purposes  All illustrations and images included in CareNotes® are the copyrighted property of [employer]  or Jonny Colon  The above information is an  only  It is not intended as medical advice for individual conditions or treatments  Talk to your doctor, nurse or pharmacist before following any medical regimen to see if it is safe and effective for you

## 2021-05-27 NOTE — PROGRESS NOTES
Pre-Visit Planning   Next 5 appointments (look out 90 days)    Jun 02, 2021 11:20 AM  (Arrive by 11:00 AM)  Office Visit with Chasidy Bajwa PA-C  [facility] ([facility] [place] ) [address] [facility] [address] [phone]        Appointment Notes for this encounter:   A1c before visit. Med check and lab- please discuss BS testing with pt    Questionnaires Reviewed/Assigned   No additional questionnaires are needed       My chart sent     Penelope Hernandez RN

## 2021-05-27 NOTE — TELEPHONE ENCOUNTER
Central Prior Authorization Team   Phone: [phone]      Prior Authorization Approval    Authorization Effective Date: 5/25/2021  Authorization Expiration Date: 5/25/2022  Medication: blood glucose (ONETOUCH VERIO) test strip - APPROVED  Approved Dose/Quantity: 270 FOR 90  Reference #:     Insurance Company: [employer] ([facility]) - Phone [phone] Fax [phone]  Expected CoPay:       CoPay Card Available:      Foundation Assistance Needed:    Which Pharmacy is filling the prescription (Not needed for infusion/clinic administered[id number] [place] [address]  Pharmacy Notified: Yes  Patient Notified: Yes (**Instructed pharmacy to notify patient when script is ready to /ship.**)

## 2021-06-02 ENCOUNTER — OFFICE VISIT (OUTPATIENT)
Dept: FAMILY MEDICINE | Facility: CLINIC | Age: 51
End: 2021-06-02
Payer: COMMERCIAL

## 2021-06-02 VITALS
BODY MASS INDEX: 39.95 KG/M2 | SYSTOLIC BLOOD PRESSURE: 126 MMHG | HEIGHT: 64 IN | WEIGHT: 234 LBS | HEART RATE: 75 BPM | OXYGEN SATURATION: 96 % | RESPIRATION RATE: 16 BRPM | TEMPERATURE: 98.2 F | DIASTOLIC BLOOD PRESSURE: 74 MMHG

## 2021-06-02 DIAGNOSIS — E66.01 MORBID OBESITY DUE TO EXCESS CALORIES (H): ICD-10-CM

## 2021-06-02 DIAGNOSIS — E11.65 TYPE 2 DIABETES MELLITUS WITH HYPERGLYCEMIA, WITHOUT LONG-TERM CURRENT USE OF INSULIN (H): Primary | ICD-10-CM

## 2021-06-02 LAB
ERYTHROCYTE [DISTWIDTH] IN BLOOD BY AUTOMATED COUNT: 14.2 % (ref 10–15)
HBA1C MFR BLD: 8.4 % (ref 0–5.6)
HCT VFR BLD AUTO: 35.8 % (ref 35–47)
HGB BLD-MCNC: 11.6 G/DL (ref 11.7–15.7)
MCH RBC QN AUTO: 27.7 PG (ref 26.5–33)
MCHC RBC AUTO-ENTMCNC: 32.4 G/DL (ref 31.5–36.5)
MCV RBC AUTO: 85 FL (ref 78–100)
PLATELET # BLD AUTO: 228 10E9/L (ref 150–450)
RBC # BLD AUTO: 4.19 10E12/L (ref 3.8–5.2)
WBC # BLD AUTO: 9.1 10E9/L (ref 4–11)

## 2021-06-02 PROCEDURE — 99214 OFFICE O/P EST MOD 30 MIN: CPT | Performed by: PHYSICIAN ASSISTANT

## 2021-06-02 PROCEDURE — 36415 COLL VENOUS BLD VENIPUNCTURE: CPT | Performed by: PHYSICIAN ASSISTANT

## 2021-06-02 PROCEDURE — 83036 HEMOGLOBIN GLYCOSYLATED A1C: CPT | Performed by: PHYSICIAN ASSISTANT

## 2021-06-02 PROCEDURE — 85027 COMPLETE CBC AUTOMATED: CPT | Performed by: PHYSICIAN ASSISTANT

## 2021-06-02 RX ORDER — DULAGLUTIDE 1.5 MG/.5ML
1.5 INJECTION, SOLUTION SUBCUTANEOUS
Qty: 6 ML | Refills: 1 | Status: CANCELLED | OUTPATIENT
Start: 2021-06-02

## 2021-06-02 ASSESSMENT — MIFFLIN-ST. JEOR: SCORE: 1656.42

## 2021-06-02 NOTE — ASSESSMENT & PLAN NOTE
- A1c improved by 3% with addition of actos and trulicity.  Discussed increasing trulicity.  Pt would like to wait because she still has 2 months of the current dose.  She would like to work on her diet and exercise for that time to see if she can get it down by her recheck in September.  Pt likes to have A1c checked everything three months.  Discussed trying to check blood sugars an average of 3 times per day.  If she checks it 5 times 1 day, she will have to check it less often another day.

## 2021-06-02 NOTE — PROGRESS NOTES
Assessment & Plan   Problem List Items Addressed This Visit        High    Type 2 diabetes mellitus with hyperglycemia, without long-term current use of insulin (H) - Primary     - A1c improved by 3% with addition of actos and trulicity.  Discussed increasing trulicity.  Pt would like to wait because she still has 2 months of the current dose.  She would like to work on her diet and exercise for that time to see if she can get it down by her recheck in September.  Pt likes to have A1c checked everything three months.  Discussed trying to check blood sugars an average of 3 times per day.  If she checks it 5 times 1 day, she will have to check it less often another day.         Relevant Medications    dulaglutide (TRULICITY) 1.5 MG/0.5ML pen    Other Relevant Orders    Hemoglobin A1c (Completed)    CBC with platelets       Medium    Morbid obesity due to excess calories (H)    Relevant Medications    dulaglutide (TRULICITY) 1.5 MG/0.5ML pen         Will see next week for preop physical due to thickened endometrial lining and endometrial polyp.    Return in about 3 months (around 9/2/2021) for Diabetes Follow Up, needs to be 40 min..    Chasidy Bajwa PA-C  [facility]    Arielle Gutierrez is a 51 year old who presents for the following health issues     HPI     Medication Followup -discuss    Taking Medication as prescribed: yes    Side Effects:  Will discuss, she is not sure -- had some diarrhea    Medication Helping Symptoms:  yes     Additional complaints: None  HPI additional notes: Brenda presents today with   Chief Complaint   Patient presents with     Recheck Medication     and labs  and discuss BS testing     Lab Results   Component Value Date    A1C 8.4 06/02/2021    A1C 11.7 03/03/2021    A1C 12.3 02/17/2021    A1C 8.4 09/11/2020    A1C 8.4 06/01/2020     Had pap smear and is due for procedure.  Has been anxious which makes blood sugars go up.  Checking blood  "sugars 2-5 times.  Will check when feels abnormal. Sometimes will wear the sensor, sometimes will not.     Feels like she can't say what she is trying to say at all.  Pt sounds fine to me during visit.  Needs to see podiatry.  Hurt her feet at a young age and has had plantar fascitis for a long time, pain will change on inside and outside of ankles.  Has thickening of achilles tendon.    Family history of a fib. Mom has it.  Sister had ablation.  Her whole life periodically will feel a galloping heart beat.  Will feel like her heart wants to race and then stops.     Wt Readings from Last 5 Encounters:   06/02/21 106.1 kg (234 lb)   03/03/21 103.4 kg (227 lb 14.4 oz)   02/17/21 103 kg (227 lb)   03/04/20 110.7 kg (244 lb)   12/13/19 110.2 kg (243 lb)       Review of Systems   Constitutional, HEENT, cardiovascular, pulmonary, gi and gu systems are negative, except as otherwise noted.      Objective    /74 (BP Location: Right arm, Patient Position: Chair, Cuff Size: Adult Large)   Pulse 75   Temp 98.2  F (36.8  C) (Oral)   Resp 16   Ht 1.626 m (5' 4\")   Wt 106.1 kg (234 lb)   SpO2 96%   Breastfeeding No   BMI 40.17 kg/m    Body mass index is 40.17 kg/m .  Physical Exam     Physical Exam   GENERAL: healthy, alert, in no acute distress  RESP: lungs clear to auscultation - no rales, no rhonchi, no wheezes  CV: regular rate and rhythm, normal S1 S2. No peripheral edema.  SKIN: no suspicious lesions, no rashes  PSYCH:Mental Status Exam  Behavior: cooperative, pleasant and interruptive  Speech: rapid  Mood: description consistent with euthymia  Affect: Appropriate/mood-congruent  Thought Processes: Rambling  Thought Content: no evidence of suicidal or homicidal ideation and no overt psychosis  Insight: Adequate  Judgment: Adequate for safety     Results for orders placed or performed in visit on 06/02/21 (from the past 24 hour(s))   Hemoglobin A1c   Result Value Ref Range    Hemoglobin A1C 8.4 (H) 0 - 5.6 %    "

## 2021-06-07 NOTE — PROGRESS NOTES
Pre-Visit Planning   Next 5 appointments (look out 90 days)    Jun 09, 2021 10:40 AM  (Arrive by 10:20 AM)  Pre-Operative Physical with LEONORA [facility] ([facility] ) [address] [address]  [phone]   Sep 01, 2021 11:30 AM  (Arrive by 11:15 AM)  Office Visit with LEONORA Waters [facility] ([facility] ) [address] [address]  [phone]        Appointment Notes for this encounter: pre op     Questionnaires Reviewed/Assigned  No additional questionnaires are needed     Patient contact not needed. she was just seen     Penelope Hernandez RN

## 2021-06-09 ENCOUNTER — OFFICE VISIT (OUTPATIENT)
Dept: FAMILY MEDICINE | Facility: CLINIC | Age: 51
End: 2021-06-09
Payer: COMMERCIAL

## 2021-06-09 VITALS
HEIGHT: 64 IN | TEMPERATURE: 98 F | RESPIRATION RATE: 16 BRPM | HEART RATE: 115 BPM | SYSTOLIC BLOOD PRESSURE: 112 MMHG | OXYGEN SATURATION: 98 % | WEIGHT: 227 LBS | DIASTOLIC BLOOD PRESSURE: 64 MMHG | BODY MASS INDEX: 38.76 KG/M2

## 2021-06-09 DIAGNOSIS — R93.89 THICKENED ENDOMETRIUM: ICD-10-CM

## 2021-06-09 DIAGNOSIS — Z78.0 POST-MENOPAUSAL: ICD-10-CM

## 2021-06-09 DIAGNOSIS — Z01.818 PREOP GENERAL PHYSICAL EXAM: Primary | ICD-10-CM

## 2021-06-09 PROCEDURE — 99214 OFFICE O/P EST MOD 30 MIN: CPT | Performed by: PHYSICIAN ASSISTANT

## 2021-06-09 ASSESSMENT — MIFFLIN-ST. JEOR: SCORE: 1624.67

## 2021-06-09 NOTE — PATIENT INSTRUCTIONS
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
## 2021-06-14 NOTE — TELEPHONE ENCOUNTER
"Coronavirus (COVID-19) Notification    Caller Name (Patient, parent, daughter/son, grandparent, etc)  Brenda, patient    Reason for call  Notify of Positive Coronavirus (COVID-19) lab results, assess symptoms,  review  [facility] [place] recommendations    Lab Result    Lab test:  2019-nCoV rRt-PCR or SARS-CoV-2 PCR    Oropharyngeal AND/OR nasopharyngeal swabs is POSITIVE for 2019-nCoV RNA/SARS-COV-2 PCR (COVID-19 virus)    RN Recommendations/Instructions per [facility] Coronavirus COVID-19 recommendations    Brief introduction script  Introduce self and then review script:  \"I am calling on behalf of [employer].  We were notified that your Coronavirus test (COVID-19) for was POSITIVE for the virus.  I have some information to relay to you but first I wanted to mention that the [place] Dept of Health will be contacting you shortly [it's possible MD already called Patient] to talk to you more about how you are feeling and other people you have had contact with who might now also have the virus.  Also, [facility] is Partnering with the [facility] for Covid-19 research, you may be contacted directly by research staff.\"    Assessment (Inquire about Patient's current symptoms)   Assessment   Current Symptoms at time of phone call: (if no symptoms, document No symptoms] Headache, fever,body aches,fatigue  Productive cough, loss of taste and smell    Symptom onset (if applicable) 1/15/2020     If at time of call, Patients symptoms hare worsened, the Patient should contact 911 or have someone drive them to Emergency Dept promptly:      If Patient calling 911, inform 911 personal that you have tested positive for the Coronavirus (COVID-19).  Place mask on and await 911 to arrive.    If Emergency Dept, If possible, please have another adult drive you to the Emergency Dept but you need to wear mask when in contact with other people.        Monoclonal Antibody Administration    You may be eligible to " "receive a new treatment with a monoclonal antibody for preventing hospitalization in patients at high risk for complications from COVID-19.   This medication is still experimental and available on a limited basis; it is given through an IV and must be given at an infusion center. Please note that not all people who are eligible will receive the medication since it is in limited supply.     Are you interested in being considered for this medication?  No.  Does the patient fit the criteria: Patient declined    If patient qualifies based on above criteria:  \"We will contact you if you are selected to receive the medication in the next 1-2 days.   This is time sensitive and if you are not selected in the next 1-2 days, you will not receive the medication.  If you do not receive a call to schedule, you have not been selected.\"    Review information with Patient    Your result was positive. This means you have COVID-19 (coronavirus).  We have sent you a letter that reviews the information that I'll be reviewing with you now.    How can I protect others?    If you have symptoms: stay home and away from others (self-isolate) until:    You've had no fever--and no medicine that reduces fever--for 1 full day (24 hours). And      Your other symptoms have gotten better. For example, your cough or breathing has improved. And     At least 10 days have passed since your symptoms started. (If you ve been told by a doctor that you have a weak immune system, wait 20 days.)     If you don't have symptoms: Stay home and away from others (self-isolate) until at least 10 days have passed since your first positive COVID-19 test. (Date test collected).    During this time:    Stay in your own room, including for meals. Use your own bathroom if you can.    Stay away from others in your home. No hugging, kissing or shaking hands. No visitors.     Don't go to work, school or anywhere else.     Clean  high touch  surfaces often (doorknobs, " counters, handles, etc.). Use a household cleaning spray or wipes. You'll find a full list on the EPA website at www.epa.gov/pesticide-registration/list-n-disinfectants-use-against-sars-cov-2.     Cover your mouth and nose with a mask, tissue or other face covering to avoid spreading germs.    Wash your hands and face often with soap and water.    Caregivers in these groups are at risk for severe illness due to COVID-19:  o People 65 years and older  o People who live in a nursing home or long-term care facility  o People with chronic disease (lung, heart, cancer, diabetes, kidney, liver, immunologic)  o People who have a weakened immune system, including those who:  - Are in cancer treatment  - Take medicine that weakens the immune system, such as corticosteroids  - Had a bone marrow or organ transplant  - Have an immune deficiency  - Have poorly controlled HIV or AIDS  - Are obese (body mass index of 40 or higher)  - Smoke regularly    Caregivers should wear gloves while washing dishes, handling laundry and cleaning bedrooms and bathrooms.    Wash and dry laundry with special caution. Don't shake dirty laundry, and use the warmest water setting you can.    If you have a weakened immune system, ask your doctor about other actions you should take.    For more tips, go to www.cdc.gov/coronavirus/2019-ncov/downloads/10Things.pdf.    You should not go back to work until you meet the guidelines above for ending your home isolation. You don't need to be retested for COVID-19 before going back to work--studies show that you won't spread the virus if it's been at least 10 days since your symptoms started (or 20 days, if you have a weak immune system).    Employers: This document serves as formal notice of your employee's medical guidelines for going back to work. They must meet the above guidelines before going back to work in person.    How can I take care of myself?    1. Get lots of rest. Drink extra fluids (unless a  doctor has told you not to).    2. Take Tylenol (acetaminophen) for fever or pain. If you have liver or kidney problems, ask your family doctor if it's okay to take Tylenol.     Take either:     650 mg (two 325 mg pills) every 4 to 6 hours, or     1,000 mg (two 500 mg pills) every 8 hours as needed.     Note: Don't take more than 3,000 mg in one day. Acetaminophen is found in many medicines (both prescribed and over-the-counter medicines). Read all labels to be sure you don't take too much.    For children, check the Tylenol bottle for the right dose (based on their age or weight).    3. If you have other health problems (like cancer, heart failure, an organ transplant or severe kidney disease): Call your specialty clinic if you don't feel better in the next 2 days.    4. Know when to call 911: Emergency warning signs include:    Trouble breathing or shortness of breath    Pain or pressure in the chest that doesn't go away    Feeling confused like you haven't felt before, or not being able to wake up    Bluish-colored lips or face    5. Sign up for [employer]. We know it's scary to hear that you have COVID-19. We want to track your symptoms to make sure you're okay over the next 2 weeks. Please look for an email from [employer]--this is a free, online program that we'll use to keep in touch. To sign up, follow the link in the email. Learn more at [email or web address].    Where can I get more information?    [facility]: [email or web address]    Coronavirus Basics: www.health.[facility].[place]/diseases/coronavirus/basics.html    What to Do If You're Sick: www.cdc.gov/coronavirus/2019-ncov/about/steps-when-sick.html    Ending Home Isolation: www.cdc.gov/coronavirus/2019-ncov/hcp/disposition-in-home-patients.html     Caring for Someone with COVID-19: www.cdc.gov/coronavirus/2019-ncov/if-you-are-sick/care-for-someone.html     [facility] clinical trials (COVID-19 research studies):  clinicalaffairs.[facility]-clinical-trials     A Positive COVID-19 letter will be sent via [employer] or the Mail.  (Exception, no letters sent to Presurgerical/Preprocedure Patients)    Carly Ornelas LPN

## 2021-06-15 ENCOUNTER — ANCILLARY PROCEDURE (OUTPATIENT)
Dept: BONE DENSITY | Facility: CLINIC | Age: 51
End: 2021-06-15
Attending: PHYSICIAN ASSISTANT
Payer: COMMERCIAL

## 2021-06-15 DIAGNOSIS — Z78.0 POST-MENOPAUSAL: ICD-10-CM

## 2021-06-15 PROCEDURE — 77080 DXA BONE DENSITY AXIAL: CPT | Performed by: INTERNAL MEDICINE

## 2021-06-17 NOTE — RESULT ENCOUNTER NOTE
Carl Gutierrez,    I just wanted to let you know that your lab results have been reviewed and are attached.    - Your bone density scan was normal.  You do not have osteopenia or osteoporosis  - We should do a repeat scan in 10 years.    Please let me know if you have any questions and have a great week!    Sincerely,    Julienne Bajwa PA-C    [facility]  [address] [place] [address]  Clinic Phone: [phone]

## 2021-06-23 ENCOUNTER — HOSPITAL PATHOLOGY (OUTPATIENT)
Dept: OTHER | Facility: CLINIC | Age: 51
End: 2021-06-23

## 2021-06-25 LAB — COPATH REPORT: NORMAL

## 2021-06-29 NOTE — PROGRESS NOTES
Pre-Visit Planning   Next 5 appointments (look out 90 days)    Jul 05, 2021  7:00 AM  (Arrive by 6:50 AM)  Phone Visit with LEONORA Waters [facility] ([facility] [address] [id number] [phone]   Sep 01, 2021 11:30 AM  (Arrive by 11:15 AM)  Office Visit with LEONORA Waters [facility] [address] [address] [phone]        Appointment Notes for this encounter: phone visit pt has been off work since January due to COVID - she states she need return to work but does not feel ready to go back without restrictions     Questionnaires Reviewed/Assigned  No additional questionnaires are needed     Spoke to patient via phone. Are there any additional questions or concerns you'd like to review with your provider during your visit? No     Visit is not preventive.    Meds  Is there anything on your medication list that needs to be updated? No  Current Outpatient Medications   Medication     albuterol (PROAIR HFA) 108 (90 Base) MCG/ACT inhaler     aspirin (ASA) 81 MG chewable tablet     blood glucose (NO BRAND SPECIFIED) lancets standard     blood glucose (NO BRAND SPECIFIED) test strip     Continuous Blood Gluc Sensor (FREESTYLE INESSA 14 DAY SENSOR) MISC     cyclobenzaprine (FLEXERIL) 10 MG tablet     dulaglutide (TRULICITY) 0.75 MG/0.5ML pen     dulaglutide (TRULICITY) 1.5 MG/0.5ML pen     fexofenadine (ALLEGRA) 180 MG tablet     HYDROcodone-acetaminophen (NORCO) 5-325 MG tablet     ibuprofen 200 MG capsule     LIDODERM 5 % patch     losartan (COZAAR) 100 MG tablet     metFORMIN (GLUCOPHAGE-XR) 500 MG 24 hr tablet     Ostomy Supplies (SKIN TAC ADHESIVE BARRIER WIPE) MISC     pioglitazone (ACTOS) 30 MG tablet     No current facility-administered medications for this visit.      Do you need refills on any of your  "medications? No  Health Maintenance Due   Topic Date Due     ANNUAL REVIEW OF  ORDERS  Never done     HEPATITIS B IMMUNIZATION (1 of 3 - Risk 3-dose series) Never done     EYE EXAM  06/01/2021     Patient is due for:  see above     MyChart  Patient is active on MyChart.    Call Summary  \"Thank you for your time today.      Penelope Hernandez RN     "

## 2021-07-03 DIAGNOSIS — E11.65 TYPE 2 DIABETES MELLITUS WITH HYPERGLYCEMIA, WITHOUT LONG-TERM CURRENT USE OF INSULIN (H): ICD-10-CM

## 2021-07-05 ENCOUNTER — VIRTUAL VISIT (OUTPATIENT)
Dept: FAMILY MEDICINE | Facility: CLINIC | Age: 51
End: 2021-07-05
Payer: COMMERCIAL

## 2021-07-05 DIAGNOSIS — Z86.16 HISTORY OF COVID-19: Primary | ICD-10-CM

## 2021-07-05 DIAGNOSIS — R31.9 HEMATURIA, UNSPECIFIED TYPE: ICD-10-CM

## 2021-07-05 PROCEDURE — 99213 OFFICE O/P EST LOW 20 MIN: CPT | Mod: TEL | Performed by: PHYSICIAN ASSISTANT

## 2021-07-05 RX ORDER — METFORMIN HCL 500 MG
TABLET, EXTENDED RELEASE 24 HR ORAL
Qty: 360 TABLET | Refills: 0 | Status: SHIPPED | OUTPATIENT
Start: 2021-07-05 | End: 2021-07-21

## 2021-07-05 NOTE — LETTER
July 5, 2021      Brenda Renee  [address] [place] [address]        To Whom It May Concern:      Brenda Renee was seen in our clinic. She may return to work with the following: limited to 6 hour workday once a week for 2 weeks starting 7/12/21  and limited to light duty - lifting no greater than 30 pounds if unable.  Starting 7/26/21, pt may return to work limited to two 6 hour workday shifts per week for 2 weeks with no lifting greater than 30 lbs if unable and will then be reevaluated to see if restrictions can be removed.      Sincerely,          Chasidy Bajwa PA-C

## 2021-07-05 NOTE — LETTER
July 5, 2021      Brenda Renee  [address] [place] [address]        To Whom It May Concern:      Brenda Renee was seen in our clinic. She may return to work with the following: limited to 6 hour workday once a week for 3 weeks starting 7/12/21  and limited to light duty - lifting no greater than 30 pounds if unable.  Starting 8/2/21, pt may return to work limited to two 6 hour workday shifts per week for 3 weeks with no lifting greater than 30 lbs if unable and will then be reevaluated to see if restrictions can be removed.      Sincerely,          Chasidy Bajwa PA-C

## 2021-07-05 NOTE — PROGRESS NOTES
Brenda is a 51 year old who is being evaluated via a billable telephone visit.      What phone number would you like to be contacted at? [phone]  How would you like to obtain your AVS? Erinn and sent through mail    Assessment & Plan     History of COVID-19  Prior to covid 19 infection, worked 2 days a week, 6-8 hours per shift as a  at [employer].  Letter provided witih restrictions:  Brenda Renee was seen in our clinic. She may return to work with the following: limited to 6 hour workday once a week for 3 weeks starting 7/12/21  and limited to light duty - lifting no greater than 30 pounds if unable.  Starting 8/2/21, pt may return to work limited to two 6 hour workday shifts per week for 3 weeks with no lifting greater than 30 lbs if unable and will then be reevaluated to see if restrictions can be removed.    She will also email form that she needs completed for work, which I will email back to her at [email or web address]    She will follow up for a recheck in 4-5 weeks to see if restrictions can be removed.    Hematuria, unspecified type  Needs recheck at visit in Sept.  - UA with Microscopic reflex to Culture; Future                 Return in about 5 weeks (around 8/9/2021) for Virtual visit, work restrictions.    Chasidy Bajwa PA-C  [facility]    Arielle Gutierrez is a 51 year old who presents for the following health issues     HPI     Needs documentation to go back to work - with some restrictions.  Has not been paid from work because she did not allow them full access to all her medical records.  She was only paid for first two weeks.    Tested positive for covid on 1/18/21.  She usually works 6-8 hours per shift, was working two days a week.  Feels like once a week 6 hours at a time is at the same time.  She is standing on her feet the whole time because she is a  at [employer].    Currently walking up the stairs with a load of  "laundry and when walking when doing something she feels SOB.  She has not noticed wheezing.  Has trouble talking more than a block without feeling SOB.  Has tried using the albuterol inhaler which seems to help.      Wants to try and go back next week.  Will have 1 shift a week for 3 weeks, then 2 shifts a week for 3 weeks.  Will then reevaluated to see if restrictions can be lifted.        Review of Systems   Constitutional, HEENT, cardiovascular, pulmonary, gi and gu systems are negative, except as otherwise noted.      Objective    Vitals - Patient Reported  Weight (Patient Reported): 104.3 kg (230 lb)  Height (Patient Reported): 163.8 cm (5' 4.5\")  BMI (Based on Pt Reported Ht/Wt): 38.87      Vitals:  No vitals were obtained today due to virtual visit.    Physical Exam   healthy, alert and no distress  PSYCH: Alert and oriented times 3; coherent speech, normal   rate and volume, able to articulate logical thoughts, able   to abstract reason, no tangential thoughts, no hallucinations   or delusions  Her affect is normal  RESP: No cough, no audible wheezing, able to talk in full sentences  Remainder of exam unable to be completed due to telephone visits                Phone call duration: 16 minutes  "

## 2021-07-07 ENCOUNTER — TELEPHONE (OUTPATIENT)
Dept: FAMILY MEDICINE | Facility: CLINIC | Age: 51
End: 2021-07-07

## 2021-07-08 ENCOUNTER — TELEPHONE (OUTPATIENT)
Dept: FAMILY MEDICINE | Facility: CLINIC | Age: 51
End: 2021-07-08

## 2021-07-08 NOTE — TELEPHONE ENCOUNTER
Patient called and wanted to make sure that her form was sent to her work. PAL Emailed the form over to her for as per patients request.     Fredo Dang, [facility]  [phone]  July 8, 2021, 2:57 PM

## 2021-07-21 DIAGNOSIS — E11.65 TYPE 2 DIABETES MELLITUS WITH HYPERGLYCEMIA, WITHOUT LONG-TERM CURRENT USE OF INSULIN (H): ICD-10-CM

## 2021-07-21 RX ORDER — METFORMIN HCL 500 MG
TABLET, EXTENDED RELEASE 24 HR ORAL
Qty: 360 TABLET | Refills: 0 | Status: SHIPPED | OUTPATIENT
Start: 2021-07-21 | End: 2021-12-08

## 2021-07-21 NOTE — TELEPHONE ENCOUNTER
Prescription approved per [facility] Refill Protocol.  Has follow up appointment on 9/1/21.  Brenda Mathew RN

## 2021-08-24 NOTE — PROGRESS NOTES
Pre-Visit Planning   Next 5 appointments (look out 90 days)    Sep 01, 2021 11:30 AM  (Arrive by 11:15 AM)  Office Visit with Chasidy Bajwa PA-C  [facility] ([facility] ) [address] [address] [phone]        Appointment Notes for this encounter: sned to lab for A1C ----f/u on diabetes    Questionnaires Reviewed/Assigned  No additional questionnaires are needed     Unable to reach. Left voicemail. Advised patient to call clinic back at [phone]- sent my chart message as well.     Penelope Hernandez RN

## 2021-09-01 ENCOUNTER — OFFICE VISIT (OUTPATIENT)
Dept: FAMILY MEDICINE | Facility: CLINIC | Age: 51
End: 2021-09-01
Payer: COMMERCIAL

## 2021-09-01 ENCOUNTER — TRANSFERRED RECORDS (OUTPATIENT)
Dept: HEALTH INFORMATION MANAGEMENT | Facility: CLINIC | Age: 51
End: 2021-09-01

## 2021-09-01 VITALS
TEMPERATURE: 98.4 F | DIASTOLIC BLOOD PRESSURE: 78 MMHG | WEIGHT: 240.1 LBS | SYSTOLIC BLOOD PRESSURE: 126 MMHG | HEIGHT: 65 IN | BODY MASS INDEX: 40 KG/M2 | HEART RATE: 72 BPM | RESPIRATION RATE: 18 BRPM

## 2021-09-01 DIAGNOSIS — R31.29 MICROHEMATURIA: ICD-10-CM

## 2021-09-01 DIAGNOSIS — M72.2 PLANTAR FASCIAL FIBROMATOSIS: Chronic | ICD-10-CM

## 2021-09-01 DIAGNOSIS — R79.89 ELEVATED LFTS: ICD-10-CM

## 2021-09-01 DIAGNOSIS — M79.18 MYOFASCIAL PAIN: Chronic | ICD-10-CM

## 2021-09-01 DIAGNOSIS — L98.9 SKIN LESION: ICD-10-CM

## 2021-09-01 DIAGNOSIS — E11.65 TYPE 2 DIABETES MELLITUS WITH HYPERGLYCEMIA, WITHOUT LONG-TERM CURRENT USE OF INSULIN (H): Primary | ICD-10-CM

## 2021-09-01 DIAGNOSIS — R06.83 SNORING: ICD-10-CM

## 2021-09-01 DIAGNOSIS — M54.2 NECK PAIN: Chronic | ICD-10-CM

## 2021-09-01 LAB
ALBUMIN UR-MCNC: NEGATIVE MG/DL
APPEARANCE UR: CLEAR
BILIRUB UR QL STRIP: NEGATIVE
COLOR UR AUTO: YELLOW
GLUCOSE UR STRIP-MCNC: NEGATIVE MG/DL
HBA1C MFR BLD: 6.5 % (ref 0–5.6)
HGB UR QL STRIP: NEGATIVE
KETONES UR STRIP-MCNC: NEGATIVE MG/DL
LEUKOCYTE ESTERASE UR QL STRIP: NEGATIVE
NITRATE UR QL: NEGATIVE
PH UR STRIP: 5.5 [PH] (ref 5–7)
SP GR UR STRIP: 1.01 (ref 1–1.03)
UROBILINOGEN UR STRIP-ACNC: 0.2 E.U./DL

## 2021-09-01 PROCEDURE — 99214 OFFICE O/P EST MOD 30 MIN: CPT | Performed by: PHYSICIAN ASSISTANT

## 2021-09-01 PROCEDURE — 80076 HEPATIC FUNCTION PANEL: CPT | Performed by: PHYSICIAN ASSISTANT

## 2021-09-01 PROCEDURE — 83036 HEMOGLOBIN GLYCOSYLATED A1C: CPT | Performed by: PHYSICIAN ASSISTANT

## 2021-09-01 PROCEDURE — 81003 URINALYSIS AUTO W/O SCOPE: CPT | Performed by: PHYSICIAN ASSISTANT

## 2021-09-01 PROCEDURE — 36415 COLL VENOUS BLD VENIPUNCTURE: CPT | Performed by: PHYSICIAN ASSISTANT

## 2021-09-01 RX ORDER — HYDROCODONE BITARTRATE AND ACETAMINOPHEN 5; 325 MG/1; MG/1
1 TABLET ORAL EVERY 6 HOURS PRN
Qty: 40 TABLET | Refills: 0 | Status: SHIPPED | OUTPATIENT
Start: 2021-09-01 | End: 2021-12-08

## 2021-09-01 RX ORDER — FLASH GLUCOSE SENSOR
1 KIT MISCELLANEOUS
Qty: 2 EACH | Refills: 11 | Status: SHIPPED | OUTPATIENT
Start: 2021-09-01

## 2021-09-01 RX ORDER — CYCLOBENZAPRINE HCL 10 MG
10 TABLET ORAL 3 TIMES DAILY PRN
Qty: 30 TABLET | Refills: 5 | Status: SHIPPED | OUTPATIENT
Start: 2021-09-01

## 2021-09-01 RX ORDER — LANOLIN ALCOHOL/MO/W.PET/CERES
400 CREAM (GRAM) TOPICAL DAILY
Qty: 90 TABLET | Refills: 3 | Status: SHIPPED | OUTPATIENT
Start: 2021-09-01 | End: 2022-06-06

## 2021-09-01 RX ORDER — ROSUVASTATIN CALCIUM 5 MG/1
5 TABLET, COATED ORAL DAILY
Qty: 90 TABLET | Refills: 1 | Status: SHIPPED | OUTPATIENT
Start: 2021-09-01 | End: 2022-02-10

## 2021-09-01 RX ORDER — PIOGLITAZONEHYDROCHLORIDE 30 MG/1
30 TABLET ORAL DAILY
Qty: 90 TABLET | Refills: 1 | Status: CANCELLED | OUTPATIENT
Start: 2021-09-01

## 2021-09-01 ASSESSMENT — MIFFLIN-ST. JEOR: SCORE: 1692.03

## 2021-09-01 NOTE — PROGRESS NOTES
"    Assessment & Plan     Type 2 diabetes mellitus with hyperglycemia, without long-term current use of insulin (H)  Will decrease trulicity dose due to side effects and excellent A1c today!  Pt agrees to start statin, will start on low dose as she has excellent cholesterol.  Will recheck in 3 months.  - REVIEW OF HEALTH MAINTENANCE PROTOCOL ORDERS  - Hemoglobin A1c  - dulaglutide (TRULICITY) 0.75 MG/0.5ML pen; Inject 0.75 mg Subcutaneous every 7 days  - rosuvastatin (CRESTOR) 5 MG tablet; Take 1 tablet (5 mg) by mouth daily  - folic acid (FOLVITE) 400 MCG tablet; Take 1 tablet (400 mcg) by mouth daily  - Continuous Blood Gluc Sensor (FREESTYLE INESSA 14 DAY SENSOR) MISC; 1 kit every 14 days    Microhematuria  Resolved  - REVIEW OF HEALTH MAINTENANCE PROTOCOL ORDERS  - UA Macro with Reflex to Micro and Culture - lab collect    Elevated LFTs  Labs pending  - REVIEW OF HEALTH MAINTENANCE PROTOCOL ORDERS  - Hepatic panel (Albumin, ALT, AST, Bili, Alk Phos, TP)    Neck pain  Medications refilled today.  - HYDROcodone-acetaminophen (NORCO) 5-325 MG tablet; Take 1 tablet by mouth every 6 hours as needed for moderate to severe pain  - cyclobenzaprine (FLEXERIL) 10 MG tablet; Take 1 tablet (10 mg) by mouth 3 times daily as needed    Myofascial pain  - HYDROcodone-acetaminophen (NORCO) 5-325 MG tablet; Take 1 tablet by mouth every 6 hours as needed for moderate to severe pain  - cyclobenzaprine (FLEXERIL) 10 MG tablet; Take 1 tablet (10 mg) by mouth 3 times daily as needed    Plantar fascial fibromatosis  - cyclobenzaprine (FLEXERIL) 10 MG tablet; Take 1 tablet (10 mg) by mouth 3 times daily as needed    Snoring  Concern for possible DASHAWN.  - SLEEP EVALUATION & MANAGEMENT REFERRAL - ADULT -; Future    Skin lesion  Will get skin check.  - Adult Dermatology Referral; Future             BMI:   Estimated body mass index is 40.58 kg/m  as calculated from the following:    Height as of this encounter: 1.638 m (5' 4.5\").    Weight as " of this encounter: 108.9 kg (240 lb 1.6 oz).   Weight management plan: Discussed healthy diet and exercise guidelines    Depression Screening Follow Up    PHQ 9/1/2021   PHQ-9 Total Score 14   Q9: Thoughts of better off dead/self-harm past 2 weeks Not at all         Follow Up Actions Taken           Return in about 3 months (around 12/1/2021) for Med Check, Fasting Lab Only Visit.    Chasidy Bajwa PA-C  [facility] SHANELL Gutierrez is a 51 year old who presents for the following health issues     History of Present Illness       Diabetes:   She presents for follow up of diabetes.  She is checking home blood glucose three times daily. She checks blood glucose before and after meals.  Blood glucose is sometimes over 200 and sometimes over 70. She is aware of hypoglycemia symptoms including confusion. She is concerned about other. She is having numbness in feet, burning in feet and weight gain. The patient has had a diabetic eye exam in the last 12 months. Eye exam performed on Sept 2020. Location of last eye exam [facility].        She eats 2-3 servings of fruits and vegetables daily.She consumes 2 sweetened beverage(s) daily.She exercises with enough effort to increase her heart rate 9 or less minutes per day.  She exercises with enough effort to increase her heart rate 3 or less days per week.   She is taking medications regularly.       Diabetes Follow-up    How often are you checking your blood sugar? Four or more times daily  Blood sugar testing frequency justification:  different medication changes, bigger swings  What time of day are you checking your blood sugars (select all that apply)?  Before and after meals  Have you had any blood sugars above 200?  Yes a couple in the last 60 days  Have you had any blood sugars below 70?  Yes a couple    What symptoms do you notice when your blood sugar is low?  Confusion    What concerns do you have today  about your diabetes? None and Other: Side affects from trulicity     Do you have any of these symptoms? (Select all that apply)  Numbness in feet and Burning in feet    Have you had a diabetic eye exam in the last 12 months? No        Hypertension Follow-up      Do you check your blood pressure regularly outside of the clinic? No     Are you following a low salt diet? No    Are your blood pressures ever more than 140 on the top number (systolic) OR more   than 90 on the bottom number (diastolic), for example 140/90? No    BP Readings from Last 2 Encounters:   09/01/21 126/78   06/09/21 112/64     Hemoglobin A1C (%)   Date Value   09/01/2021 6.5 (H)   06/02/2021 8.4 (H)   03/03/2021 11.7 (H)     LDL Cholesterol Calculated (mg/dL)   Date Value   02/17/2021 86   09/09/2019 69         How many servings of fruits and vegetables do you eat daily?  2-3    On average, how many sweetened beverages do you drink each day (Examples: soda, juice, sweet tea, etc.  Do NOT count diet or artificially sweetened beverages)?   2    How many days per week do you exercise enough to make your heart beat faster? 3 or less    How many minutes a day do you exercise enough to make your heart beat faster? 9 or less    How many days per week do you miss taking your medication? 0  Additional complaints: needs medication refills.  Still on limited work schedule, recovering from covid.    HPI additional notes: Brenda presents today with   Chief Complaint   Patient presents with     Diabetes     follow up   Increased trulicity and added actos last time, A1c is now in normal range.  Pt has had weight gain since last visit.    Lots of gas production and burping since on higher dose of trulicity.  Diarrhea has gotten a little bit better.           Review of Systems   Constitutional, HEENT, cardiovascular, pulmonary, gi and gu systems are negative, except as otherwise noted.      Objective    /78 (BP Location: Right arm, Patient Position:  "Sitting, Cuff Size: Adult Regular)   Pulse 72   Temp 98.4  F (36.9  C) (Oral)   Resp 18   Ht 1.638 m (5' 4.5\")   Wt 108.9 kg (240 lb 1.6 oz)   BMI 40.58 kg/m    Body mass index is 40.58 kg/m .  Physical Exam     Physical Exam   GENERAL: healthy, alert, in no acute distress  SKIN: no suspicious lesions, no rashes  PSYCH: Alert and oriented times 3;  Able to articulate logical thoughts. Affect is normal.    Results for orders placed or performed in visit on 09/01/21 (from the past 24 hour(s))   Hemoglobin A1c   Result Value Ref Range    Hemoglobin A1C 6.5 (H) 0.0 - 5.6 %   UA Macro with Reflex to Micro and Culture - lab collect    Specimen: Urine, Midstream   Result Value Ref Range    Color Urine Yellow Colorless, Straw, Light Yellow, Yellow    Appearance Urine Clear Clear    Glucose Urine Negative Negative mg/dL    Bilirubin Urine Negative Negative    Ketones Urine Negative Negative mg/dL    Specific Gravity Urine 1.010 1.003 - 1.035    Blood Urine Negative Negative    pH Urine 5.5 5.0 - 7.0    Protein Albumin Urine Negative Negative mg/dL    Urobilinogen Urine 0.2 0.2, 1.0 E.U./dL    Nitrite Urine Negative Negative    Leukocyte Esterase Urine Negative Negative    Narrative    Microscopic not indicated      "

## 2021-09-02 LAB
ALBUMIN SERPL-MCNC: 3.4 G/DL (ref 3.4–5)
ALP SERPL-CCNC: 121 U/L (ref 40–150)
ALT SERPL W P-5'-P-CCNC: 43 U/L (ref 0–50)
AST SERPL W P-5'-P-CCNC: 27 U/L (ref 0–45)
BILIRUB DIRECT SERPL-MCNC: 0.1 MG/DL (ref 0–0.2)
BILIRUB SERPL-MCNC: 0.3 MG/DL (ref 0.2–1.3)
PROT SERPL-MCNC: 7.9 G/DL (ref 6.8–8.8)

## 2021-09-02 ASSESSMENT — PATIENT HEALTH QUESTIONNAIRE - PHQ9: SUM OF ALL RESPONSES TO PHQ QUESTIONS 1-9: 14

## 2021-09-02 NOTE — RESULT ENCOUNTER NOTE
Carl Gutierrez,    I just wanted to let you know that your lab results have been reviewed and are attached.    Your liver function is now in normal range.    Please let me know if you have any questions and have a great week!    Sincerely,    Julienne Bajwa PA-C    [facility]  [address] [place] [address]  Clinic Phone: [phone]

## 2021-09-07 ENCOUNTER — TRANSFERRED RECORDS (OUTPATIENT)
Dept: HEALTH INFORMATION MANAGEMENT | Facility: CLINIC | Age: 51
End: 2021-09-07

## 2021-09-07 LAB — RETINOPATHY: NEGATIVE

## 2021-10-08 DIAGNOSIS — E11.65 TYPE 2 DIABETES MELLITUS WITH HYPERGLYCEMIA, WITHOUT LONG-TERM CURRENT USE OF INSULIN (H): Primary | ICD-10-CM

## 2021-10-08 RX ORDER — DULAGLUTIDE 1.5 MG/.5ML
INJECTION, SOLUTION SUBCUTANEOUS
Qty: 6 ML | Refills: 3 | Status: SHIPPED | OUTPATIENT
Start: 2021-10-08 | End: 2021-12-08

## 2021-10-12 NOTE — NURSING NOTE
"Chief Complaint   Patient presents with     Physical       Initial /70 (BP Location: Right arm, Patient Position: Chair, Cuff Size: Adult Large)  Pulse 76  Temp 99.1  F (37.3  C) (Tympanic)  Resp 16  Ht 5' 4.5\" (1.638 m)  Wt 253 lb (114.8 kg)  SpO2 98%  BMI 42.76 kg/m2 Estimated body mass index is 42.76 kg/(m^2) as calculated from the following:    Height as of this encounter: 5' 4.5\" (1.638 m).    Weight as of this encounter: 253 lb (114.8 kg).  Medication Reconciliation: complete    " [FreeTextEntry1] : The patient is a 67 year old male presenting today for a follow up for a history of kidney stones.   \par Denies nocturia\par No pain or discomfort associated with his stones.\par He has lost a significant amount of weight. \par He exercises daily and is following a specific diet.\par The patient takes Tamsulosin 0.4 mg, one capsule in the evening, potassium chloride 10 meq, one capsule daliy, and HCTZ 50 mg, one tablet daily. \par \par His Litholink from 6/26/21 showed normal urine output, SS CaOx, very high oxalate, high sodium, SS CaP, high protein and high sodium. \par \par The renal US from 10/12/21 demonstrated:\par Right kidney: 9.9 x 5.1 x 4.3 cm \par Cortical Thickness: 1.3 cm UP 1.4 cm LP \par \par Left kidney: 11.0 x 5.1 x 4.8 cm			 \par Cortical Thickness: 1.3 cm UP 1.0 cm LP \par  \par Echogenicity: Normal\par \par Bladder: Not visualized \par \par Findings: There are bilateral nonobstructing stones , in the right kidney lower pole 6.1 mm and in the left kidney lower pole 4.9 mm and 3.9 mm Both kidneys are normal in size and echogenicity without hydronephrosis or solid masses visualized. \par \par Blood work today includes PSA. \par \par I highly suggest that he cuts back on his oxalate, protein, and salt intake. \par \par The patient will repeat the Litholink in 2 months. \par \par The patient will return to the office in 3 months. \par \par I spent 25 minutes with the patient.

## 2021-11-30 NOTE — PROGRESS NOTES
Pre-Visit Planning   Next 5 appointments (look out 90 days)    Dec 08, 2021  9:00 AM  (Arrive by 8:40 AM)  Office Visit with Chasidy Bajwa PA-C  [facility] ([facility] [place] ) [address] [address] [phone]        Appointment Notes for this encounter:    med check     Patient contact not needed.  E check in    Penelope Hernandez RN

## 2021-12-08 ENCOUNTER — OFFICE VISIT (OUTPATIENT)
Dept: FAMILY MEDICINE | Facility: CLINIC | Age: 51
End: 2021-12-08
Payer: COMMERCIAL

## 2021-12-08 VITALS
WEIGHT: 247 LBS | HEART RATE: 75 BPM | RESPIRATION RATE: 20 BRPM | TEMPERATURE: 98 F | DIASTOLIC BLOOD PRESSURE: 80 MMHG | OXYGEN SATURATION: 98 % | SYSTOLIC BLOOD PRESSURE: 138 MMHG | BODY MASS INDEX: 41.74 KG/M2

## 2021-12-08 DIAGNOSIS — R31.9 HEMATURIA, UNSPECIFIED TYPE: ICD-10-CM

## 2021-12-08 DIAGNOSIS — E11.65 TYPE 2 DIABETES MELLITUS WITH HYPERGLYCEMIA, WITHOUT LONG-TERM CURRENT USE OF INSULIN (H): Primary | ICD-10-CM

## 2021-12-08 DIAGNOSIS — Z23 NEED FOR VACCINATION: ICD-10-CM

## 2021-12-08 DIAGNOSIS — I10 ESSENTIAL HYPERTENSION WITH GOAL BLOOD PRESSURE LESS THAN 140/90: Chronic | ICD-10-CM

## 2021-12-08 DIAGNOSIS — E11.65 TYPE 2 DIABETES MELLITUS WITH HYPERGLYCEMIA, WITHOUT LONG-TERM CURRENT USE OF INSULIN (H): ICD-10-CM

## 2021-12-08 DIAGNOSIS — M54.2 NECK PAIN: Chronic | ICD-10-CM

## 2021-12-08 DIAGNOSIS — R41.840 POOR CONCENTRATION: ICD-10-CM

## 2021-12-08 DIAGNOSIS — M79.18 MYOFASCIAL PAIN: Chronic | ICD-10-CM

## 2021-12-08 LAB
ALBUMIN UR-MCNC: NEGATIVE MG/DL
APPEARANCE UR: CLEAR
BACTERIA #/AREA URNS HPF: ABNORMAL /HPF
BILIRUB UR QL STRIP: NEGATIVE
COLOR UR AUTO: YELLOW
GLUCOSE UR STRIP-MCNC: NEGATIVE MG/DL
HBA1C MFR BLD: 8.5 % (ref 0–5.6)
HGB UR QL STRIP: NEGATIVE
KETONES UR STRIP-MCNC: NEGATIVE MG/DL
LEUKOCYTE ESTERASE UR QL STRIP: NEGATIVE
MUCOUS THREADS #/AREA URNS LPF: PRESENT /LPF
NITRATE UR QL: NEGATIVE
PH UR STRIP: 5 [PH] (ref 5–7)
RBC #/AREA URNS AUTO: ABNORMAL /HPF
SP GR UR STRIP: >=1.03 (ref 1–1.03)
SQUAMOUS #/AREA URNS AUTO: ABNORMAL /LPF
UROBILINOGEN UR STRIP-ACNC: 0.2 E.U./DL
WBC #/AREA URNS AUTO: ABNORMAL /HPF

## 2021-12-08 PROCEDURE — 83036 HEMOGLOBIN GLYCOSYLATED A1C: CPT | Performed by: PHYSICIAN ASSISTANT

## 2021-12-08 PROCEDURE — 0064A COVID-19,PF,MODERNA (18+ YRS BOOSTER .25ML): CPT | Performed by: PHYSICIAN ASSISTANT

## 2021-12-08 PROCEDURE — 91306 COVID-19,PF,MODERNA (18+ YRS BOOSTER .25ML): CPT | Performed by: PHYSICIAN ASSISTANT

## 2021-12-08 PROCEDURE — 81001 URINALYSIS AUTO W/SCOPE: CPT | Performed by: PHYSICIAN ASSISTANT

## 2021-12-08 PROCEDURE — 36415 COLL VENOUS BLD VENIPUNCTURE: CPT | Performed by: PHYSICIAN ASSISTANT

## 2021-12-08 PROCEDURE — 99214 OFFICE O/P EST MOD 30 MIN: CPT | Performed by: PHYSICIAN ASSISTANT

## 2021-12-08 RX ORDER — PIOGLITAZONEHYDROCHLORIDE 15 MG/1
15 TABLET ORAL DAILY
Qty: 90 TABLET | Refills: 1 | Status: SHIPPED | OUTPATIENT
Start: 2021-12-08 | End: 2022-06-06

## 2021-12-08 RX ORDER — DEXTROAMPHETAMINE SACCHARATE, AMPHETAMINE ASPARTATE MONOHYDRATE, DEXTROAMPHETAMINE SULFATE AND AMPHETAMINE SULFATE 1.25; 1.25; 1.25; 1.25 MG/1; MG/1; MG/1; MG/1
5 CAPSULE, EXTENDED RELEASE ORAL DAILY
Qty: 90 CAPSULE | Refills: 0 | Status: SHIPPED | OUTPATIENT
Start: 2021-12-08 | End: 2022-09-09

## 2021-12-08 RX ORDER — HYDROCODONE BITARTRATE AND ACETAMINOPHEN 5; 325 MG/1; MG/1
1 TABLET ORAL EVERY 6 HOURS PRN
Qty: 40 TABLET | Refills: 0 | Status: SHIPPED | OUTPATIENT
Start: 2021-12-08 | End: 2022-06-06

## 2021-12-08 RX ORDER — METFORMIN HCL 500 MG
TABLET, EXTENDED RELEASE 24 HR ORAL
Qty: 360 TABLET | Refills: 1 | Status: SHIPPED | OUTPATIENT
Start: 2021-12-08 | End: 2022-06-06

## 2021-12-08 RX ORDER — LOSARTAN POTASSIUM 100 MG/1
100 TABLET ORAL DAILY
Qty: 90 TABLET | Refills: 2 | Status: SHIPPED | OUTPATIENT
Start: 2021-12-08 | End: 2022-09-09
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
## 2021-12-11 DIAGNOSIS — E11.65 TYPE 2 DIABETES MELLITUS WITH HYPERGLYCEMIA, WITHOUT LONG-TERM CURRENT USE OF INSULIN (H): ICD-10-CM

## 2021-12-13 RX ORDER — BLOOD SUGAR DIAGNOSTIC
STRIP MISCELLANEOUS
Qty: 270 STRIP | Refills: 1 | Status: SHIPPED | OUTPATIENT
Start: 2021-12-13 | End: 2022-06-20

## 2021-12-13 NOTE — TELEPHONE ENCOUNTER
See generic test strip prescription on file and refill that one (from 5/24/2021)  Prescription approved per [facility] Refill Protocol.  Mikayla Price, RN, BSN  Message handled by CLINIC NURSE.

## 2022-02-10 DIAGNOSIS — E11.65 TYPE 2 DIABETES MELLITUS WITH HYPERGLYCEMIA, WITHOUT LONG-TERM CURRENT USE OF INSULIN (H): ICD-10-CM

## 2022-02-10 RX ORDER — ROSUVASTATIN CALCIUM 5 MG/1
TABLET, COATED ORAL
Qty: 90 TABLET | Refills: 0 | Status: SHIPPED | OUTPATIENT
Start: 2022-02-10 | End: 2022-05-09

## 2022-02-10 NOTE — TELEPHONE ENCOUNTER
Prescription approved per [facility] Refill Protocol.  Brenda Mathew RN   LDL Cholesterol Calculated   Date Value Ref Range Status   02/17/2021 86 <100 mg/dL Final     Comment:     Desirable:       <100 mg/dl

## 2022-04-10 ENCOUNTER — HEALTH MAINTENANCE LETTER (OUTPATIENT)
Age: 52
End: 2022-04-10

## 2022-04-13 ENCOUNTER — MYC MEDICAL ADVICE (OUTPATIENT)
Dept: FAMILY MEDICINE | Facility: CLINIC | Age: 52
End: 2022-04-13
Payer: COMMERCIAL

## 2022-04-24 DIAGNOSIS — E11.65 TYPE 2 DIABETES MELLITUS WITH HYPERGLYCEMIA (H): ICD-10-CM

## 2022-04-25 RX ORDER — LANCETS 30 GAUGE
EACH MISCELLANEOUS
Qty: 300 EACH | Refills: 1 | Status: SHIPPED | OUTPATIENT
Start: 2022-04-25 | End: 2022-11-08

## 2022-04-25 NOTE — TELEPHONE ENCOUNTER
Routing refill request to provider for review/approval because:  Drug not active on patient's medication list    Magalis LENZ RN

## 2022-05-04 NOTE — TELEPHONE ENCOUNTER
Called pt and she requested to make yearly preventative visit 5/18/22. Also scheduled DM f/u 6/6/22. She requests that she wait until the June visit to check A1C.    Socorro KEEN RN

## 2022-05-13 ENCOUNTER — TELEPHONE (OUTPATIENT)
Dept: FAMILY MEDICINE | Facility: CLINIC | Age: 52
End: 2022-05-13
Payer: COMMERCIAL

## 2022-05-13 DIAGNOSIS — Z13.9 SCREENING FOR CONDITION: Primary | ICD-10-CM

## 2022-05-13 NOTE — TELEPHONE ENCOUNTER
Pt calling would like RX for COVD test kit so she can test at home     She states [employer] told her if she gets RX then insurance will cover this for her.     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     Please advise. -     Penelope Hernandez RN

## 2022-05-16 RX ORDER — COVID-19 ANTIGEN TEST
1 KIT MISCELLANEOUS PRN
Qty: 1 KIT | Refills: 1 | Status: SHIPPED | OUTPATIENT
Start: 2022-05-16 | End: 2022-06-27

## 2022-06-03 DIAGNOSIS — E11.65 TYPE 2 DIABETES MELLITUS WITH HYPERGLYCEMIA, WITHOUT LONG-TERM CURRENT USE OF INSULIN (H): ICD-10-CM

## 2022-06-04 DIAGNOSIS — E11.65 TYPE 2 DIABETES MELLITUS WITH HYPERGLYCEMIA, WITHOUT LONG-TERM CURRENT USE OF INSULIN (H): ICD-10-CM

## 2022-06-06 ENCOUNTER — OFFICE VISIT (OUTPATIENT)
Dept: FAMILY MEDICINE | Facility: CLINIC | Age: 52
End: 2022-06-06
Payer: COMMERCIAL

## 2022-06-06 VITALS
BODY MASS INDEX: 43.6 KG/M2 | RESPIRATION RATE: 18 BRPM | HEART RATE: 77 BPM | SYSTOLIC BLOOD PRESSURE: 128 MMHG | OXYGEN SATURATION: 98 % | HEIGHT: 65 IN | WEIGHT: 261.7 LBS | TEMPERATURE: 98.3 F | DIASTOLIC BLOOD PRESSURE: 72 MMHG

## 2022-06-06 DIAGNOSIS — R41.840 POOR CONCENTRATION: ICD-10-CM

## 2022-06-06 DIAGNOSIS — E66.01 MORBID OBESITY DUE TO EXCESS CALORIES (H): ICD-10-CM

## 2022-06-06 DIAGNOSIS — Z86.16 HISTORY OF COVID-19: ICD-10-CM

## 2022-06-06 DIAGNOSIS — J98.01 ACUTE BRONCHOSPASM: ICD-10-CM

## 2022-06-06 DIAGNOSIS — M54.2 NECK PAIN: Chronic | ICD-10-CM

## 2022-06-06 DIAGNOSIS — M79.18 MYOFASCIAL PAIN: ICD-10-CM

## 2022-06-06 DIAGNOSIS — E11.65 TYPE 2 DIABETES MELLITUS WITH HYPERGLYCEMIA, WITHOUT LONG-TERM CURRENT USE OF INSULIN (H): Primary | ICD-10-CM

## 2022-06-06 LAB
ALBUMIN SERPL-MCNC: 3.6 G/DL (ref 3.4–5)
ALP SERPL-CCNC: 117 U/L (ref 40–150)
ALT SERPL W P-5'-P-CCNC: 40 U/L (ref 0–50)
ANION GAP SERPL CALCULATED.3IONS-SCNC: 2 MMOL/L (ref 3–14)
AST SERPL W P-5'-P-CCNC: 26 U/L (ref 0–45)
BILIRUB SERPL-MCNC: 0.4 MG/DL (ref 0.2–1.3)
BUN SERPL-MCNC: 9 MG/DL (ref 7–30)
CALCIUM SERPL-MCNC: 9.4 MG/DL (ref 8.5–10.1)
CHLORIDE BLD-SCNC: 105 MMOL/L (ref 94–109)
CHOLEST SERPL-MCNC: 105 MG/DL
CO2 SERPL-SCNC: 31 MMOL/L (ref 20–32)
CREAT SERPL-MCNC: 0.84 MG/DL (ref 0.52–1.04)
CREAT UR-MCNC: 176 MG/DL
FASTING STATUS PATIENT QL REPORTED: NO
GFR SERPL CREATININE-BSD FRML MDRD: 83 ML/MIN/1.73M2
GLUCOSE BLD-MCNC: 170 MG/DL (ref 70–99)
HBA1C MFR BLD: 8.8 % (ref 0–5.6)
HDLC SERPL-MCNC: 49 MG/DL
LDLC SERPL CALC-MCNC: 32 MG/DL
MICROALBUMIN UR-MCNC: 12 MG/L
MICROALBUMIN/CREAT UR: 6.82 MG/G CR (ref 0–25)
NONHDLC SERPL-MCNC: 56 MG/DL
POTASSIUM BLD-SCNC: 4.6 MMOL/L (ref 3.4–5.3)
PROT SERPL-MCNC: 8.2 G/DL (ref 6.8–8.8)
SODIUM SERPL-SCNC: 138 MMOL/L (ref 133–144)
TRIGL SERPL-MCNC: 119 MG/DL

## 2022-06-06 PROCEDURE — 99215 OFFICE O/P EST HI 40 MIN: CPT | Performed by: PHYSICIAN ASSISTANT

## 2022-06-06 PROCEDURE — 86140 C-REACTIVE PROTEIN: CPT | Performed by: PHYSICIAN ASSISTANT

## 2022-06-06 PROCEDURE — 86200 CCP ANTIBODY: CPT | Performed by: PHYSICIAN ASSISTANT

## 2022-06-06 PROCEDURE — 82306 VITAMIN D 25 HYDROXY: CPT | Performed by: PHYSICIAN ASSISTANT

## 2022-06-06 PROCEDURE — 80061 LIPID PANEL: CPT | Performed by: PHYSICIAN ASSISTANT

## 2022-06-06 PROCEDURE — 80053 COMPREHEN METABOLIC PANEL: CPT | Performed by: PHYSICIAN ASSISTANT

## 2022-06-06 PROCEDURE — 36415 COLL VENOUS BLD VENIPUNCTURE: CPT | Performed by: PHYSICIAN ASSISTANT

## 2022-06-06 PROCEDURE — 83036 HEMOGLOBIN GLYCOSYLATED A1C: CPT | Performed by: PHYSICIAN ASSISTANT

## 2022-06-06 PROCEDURE — 82043 UR ALBUMIN QUANTITATIVE: CPT | Performed by: PHYSICIAN ASSISTANT

## 2022-06-06 RX ORDER — METFORMIN HCL 500 MG
TABLET, EXTENDED RELEASE 24 HR ORAL
Qty: 360 TABLET | Refills: 1 | Status: SHIPPED | OUTPATIENT
Start: 2022-06-06 | End: 2022-11-22

## 2022-06-06 RX ORDER — HYDROCODONE BITARTRATE AND ACETAMINOPHEN 5; 325 MG/1; MG/1
1 TABLET ORAL EVERY 6 HOURS PRN
Qty: 40 TABLET | Refills: 0 | Status: SHIPPED | OUTPATIENT
Start: 2022-06-06 | End: 2022-06-27

## 2022-06-06 RX ORDER — ROSUVASTATIN CALCIUM 5 MG/1
TABLET, COATED ORAL
Qty: 90 TABLET | Refills: 4 | Status: SHIPPED | OUTPATIENT
Start: 2022-06-06

## 2022-06-06 RX ORDER — ALBUTEROL SULFATE 90 UG/1
2 AEROSOL, METERED RESPIRATORY (INHALATION) EVERY 6 HOURS PRN
Qty: 18 G | Refills: 3 | Status: SHIPPED | OUTPATIENT
Start: 2022-06-06 | End: 2023-02-21

## 2022-06-06 RX ORDER — ROSUVASTATIN CALCIUM 5 MG/1
TABLET, COATED ORAL
Qty: 30 TABLET | Refills: 0 | OUTPATIENT
Start: 2022-06-06

## 2022-06-06 RX ORDER — LANOLIN ALCOHOL/MO/W.PET/CERES
400 CREAM (GRAM) TOPICAL DAILY
Qty: 90 TABLET | Refills: 3 | Status: SHIPPED | OUTPATIENT
Start: 2022-06-06 | End: 2023-06-26

## 2022-06-06 RX ORDER — PIOGLITAZONEHYDROCHLORIDE 15 MG/1
TABLET ORAL
Qty: 90 TABLET | Refills: 1 | OUTPATIENT
Start: 2022-06-06

## 2022-06-06 RX ORDER — PIOGLITAZONEHYDROCHLORIDE 15 MG/1
15 TABLET ORAL DAILY
Qty: 90 TABLET | Refills: 1 | Status: SHIPPED | OUTPATIENT
Start: 2022-06-06 | End: 2022-11-22

## 2022-06-06 RX ORDER — METFORMIN HCL 500 MG
TABLET, EXTENDED RELEASE 24 HR ORAL
Qty: 360 TABLET | Refills: 1 | OUTPATIENT
Start: 2022-06-06

## 2022-06-06 RX ORDER — HYDROCHLOROTHIAZIDE 12.5 MG/1
12.5 TABLET ORAL DAILY
Qty: 90 TABLET | Refills: 0 | Status: SHIPPED | OUTPATIENT
Start: 2022-06-06 | End: 2022-09-06

## 2022-06-06 NOTE — PROGRESS NOTES
Assessment & Plan     Type 2 diabetes mellitus with hyperglycemia, without long-term current use of insulin (H)  If still high in 3 months, will need to increase trulicity to 1.5mg.  Pt would like to work on lifestyle modifications first.  - Comprehensive metabolic panel  - Lipid panel reflex to direct LDL Non-fasting  - Hemoglobin A1c  - Albumin Random Urine Quantitative with Creat Ratio  - dulaglutide (TRULICITY) 0.75 MG/0.5ML pen; Inject 0.75 mg Subcutaneous every 7 days  - metFORMIN (GLUCOPHAGE XR) 500 MG 24 hr tablet; TAKE 2 TABLETS BY MOUTH 2 TIMES DAILY WITH MEALS  - pioglitazone (ACTOS) 15 MG tablet; Take 1 tablet (15 mg) by mouth daily  - rosuvastatin (CRESTOR) 5 MG tablet; Take 1 tab PO DAILY  - Hemoglobin A1c; Future  - hydrochlorothiazide (HYDRODIURIL) 12.5 MG tablet; Take 1 tablet (12.5 mg) by mouth daily  - folic acid (FOLVITE) 400 MCG tablet; Take 1 tablet (400 mcg) by mouth daily    Morbid obesity due to excess calories (H)      Poor concentration  Has not yet started adderall due to fear of addiction.  Discussed risks at length and low dosage.  Recommend she start as pt has always been very tangential and hard to keep on track.    Myofascial pain  - HYDROcodone-acetaminophen (NORCO) 5-325 MG tablet; Take 1 tablet by mouth every 6 hours as needed for moderate to severe pain    Neck pain  - HYDROcodone-acetaminophen (NORCO) 5-325 MG tablet; Take 1 tablet by mouth every 6 hours as needed for moderate to severe pain    History of COVID-19  Brain fog, although pt does not seem that different from baseline, still feeling short of breath and having more joint pain since having covid.  Needs refill on albuterol which was placed.  - Adult Post Covid Clinic Referral; Future    Acute bronchospasm    - albuterol (PROAIR HFA) 108 (90 Base) MCG/ACT inhaler; Inhale 2 puffs into the lungs every 6 hours as needed      I spent a total of 51 minutes on the day of the visit.   Time spent doing chart review, history  "and exam, documentation and further activities per the note       BMI:   Estimated body mass index is 44.23 kg/m  as calculated from the following:    Height as of this encounter: 1.638 m (5' 4.5\").    Weight as of this encounter: 118.7 kg (261 lb 11.2 oz).   Weight management plan: discussed diet and increasing exercise.      Return in about 3 months (around 9/6/2022) for Non-Fasting Lab Only Visit.    Chasidy Bajwa PA-C  [facility] HospitalSHAILESH Gutierrez is a 52 year old who presents for the following health issues     History of Present Illness       Diabetes:   She presents for follow up of diabetes.  She is checking home blood glucose two times daily. She checks blood glucose before meals.  Blood glucose is sometimes over 200 and never under 70. She is aware of hypoglycemia symptoms including confusion. She is concerned about other. She is having numbness in feet and burning in feet.         She eats 2-3 servings of fruits and vegetables daily.She consumes 2 sweetened beverage(s) daily.She exercises with enough effort to increase her heart rate 9 or less minutes per day.  She exercises with enough effort to increase her heart rate 3 or less days per week. She is missing 1 dose(s) of medications per week.     Additional complaints: None  HPI additional notes: Brenda presents today with   Chief Complaint   Patient presents with     Diabetes     Wt Readings from Last 5 Encounters:   06/06/22 118.7 kg (261 lb 11.2 oz)   12/08/21 112 kg (247 lb)   09/01/21 108.9 kg (240 lb 1.6 oz)   06/09/21 103 kg (227 lb)   06/02/21 106.1 kg (234 lb)     Restarted actos after last visit.  Having no significant side effects from medications, blood sugar after meals might be 200 but are not that high when fasting.  Has had some acute illness and her sugars are higher.      Still having taste issues since having covid, has been trying to watch diet more, increase nuts and eggs.   Trying to " "limit carbs.          Review of Systems   Constitutional, HEENT, cardiovascular, pulmonary, gi and gu systems are negative, except as otherwise noted.      Objective    /72   Pulse 77   Temp 98.3  F (36.8  C) (Oral)   Resp 18   Ht 1.638 m (5' 4.5\")   Wt 118.7 kg (261 lb 11.2 oz)   SpO2 98%   BMI 44.23 kg/m    Body mass index is 44.23 kg/m .  Physical Exam     Physical Exam   GENERAL: healthy, alert, in no acute distress  SKIN: brown papules on bilateral toes, non blanching, do not appear to be petechia.   PSYCH:Mental Status Exam  Behavior: cooperative and pleasant  Speech: slowed  Mood: depressed  Affect: Tearful  Thought Processes: Tangential and Rambling  Thought Content: no evidence of suicidal or homicidal ideation and no overt psychosis  Insight: Adequate  Judgment: Adequate for safety     Results for orders placed or performed in visit on 06/06/22 (from the past 24 hour(s))   Hemoglobin A1c   Result Value Ref Range    Hemoglobin A1C 8.8 (H) 0.0 - 5.6 %      Lab Results   Component Value Date    A1C 8.8 06/06/2022    A1C 8.5 12/08/2021    A1C 6.5 09/01/2021    A1C 8.4 06/02/2021    A1C 11.7 03/03/2021    A1C 12.3 02/17/2021    A1C 8.4 09/11/2020    A1C 8.4 06/01/2020       "

## 2022-06-06 NOTE — TELEPHONE ENCOUNTER
Pt has an appt today, 6/6/22, with PCP, can address refill at appt as labs are due.     Magalis LENZ RN

## 2022-06-06 NOTE — Clinical Note
Brenda is going to follow me to [facility] but will get her labs done at [place] in Sept because she doesn't want to get off of her 3 month schedule.  We will schedule that at her physical on Wed.

## 2022-06-07 NOTE — PROGRESS NOTES
Pre-Visit Planning   Next 5 appointments (look out 90 days)    Jun 08, 2022 10:00 AM  (Arrive by 9:40 AM)  Adult Preventative Visit with Chasidy Bajwa PA-C  [facility] [address] [address] [address] [phone]        Appointment Notes for this encounter:   UNDRESS WAIST UP IF WANTS BREAST EXAM yearly preventative COVID booster    Questionnaires Reviewed/Assigned  No additional questionnaires are needed     Patient contact not needed.  Pt was just seen     Penelope Hernandez RN

## 2022-06-07 NOTE — RESULT ENCOUNTER NOTE
Carl Gutierrez,    I just wanted to let you know that your lab results have been reviewed and are attached.    Cholesterol has improved since starting the new medication.  Don't worry about the anion gap, it's not clinically significant.    Please let me know if you have any questions and have a great week!    Sincerely,    Julienne Bajwa PA-C    [facility]  [address] [place] [address]  Clinic Phone: [phone]

## 2022-06-08 ENCOUNTER — OFFICE VISIT (OUTPATIENT)
Dept: FAMILY MEDICINE | Facility: CLINIC | Age: 52
End: 2022-06-08
Payer: COMMERCIAL

## 2022-06-08 VITALS
BODY MASS INDEX: 43.45 KG/M2 | WEIGHT: 260.8 LBS | SYSTOLIC BLOOD PRESSURE: 136 MMHG | HEART RATE: 76 BPM | RESPIRATION RATE: 18 BRPM | TEMPERATURE: 98.2 F | OXYGEN SATURATION: 98 % | HEIGHT: 65 IN | DIASTOLIC BLOOD PRESSURE: 76 MMHG

## 2022-06-08 DIAGNOSIS — E11.65 TYPE 2 DIABETES MELLITUS WITH HYPERGLYCEMIA, WITHOUT LONG-TERM CURRENT USE OF INSULIN (H): ICD-10-CM

## 2022-06-08 DIAGNOSIS — Z00.00 ROUTINE HISTORY AND PHYSICAL EXAMINATION OF ADULT: Primary | ICD-10-CM

## 2022-06-08 DIAGNOSIS — Z23 NEED FOR VACCINATION: ICD-10-CM

## 2022-06-08 LAB
CRP SERPL-MCNC: 9.7 MG/L (ref 0–8)
DEPRECATED CALCIDIOL+CALCIFEROL SERPL-MC: 47 UG/L (ref 20–75)

## 2022-06-08 PROCEDURE — 0054A COVID-19,PF,PFIZER (12+ YRS): CPT | Performed by: PHYSICIAN ASSISTANT

## 2022-06-08 PROCEDURE — 90715 TDAP VACCINE 7 YRS/> IM: CPT | Performed by: PHYSICIAN ASSISTANT

## 2022-06-08 PROCEDURE — 91305 COVID-19,PF,PFIZER (12+ YRS): CPT | Performed by: PHYSICIAN ASSISTANT

## 2022-06-08 PROCEDURE — 99396 PREV VISIT EST AGE 40-64: CPT | Mod: 25 | Performed by: PHYSICIAN ASSISTANT

## 2022-06-08 PROCEDURE — 90471 IMMUNIZATION ADMIN: CPT | Performed by: PHYSICIAN ASSISTANT

## 2022-06-08 ASSESSMENT — PATIENT HEALTH QUESTIONNAIRE - PHQ9
10. IF YOU CHECKED OFF ANY PROBLEMS, HOW DIFFICULT HAVE THESE PROBLEMS MADE IT FOR YOU TO DO YOUR WORK, TAKE CARE OF THINGS AT HOME, OR GET ALONG WITH OTHER PEOPLE: EXTREMELY DIFFICULT
SUM OF ALL RESPONSES TO PHQ QUESTIONS 1-9: 20
SUM OF ALL RESPONSES TO PHQ QUESTIONS 1-9: 20

## 2022-06-08 ASSESSMENT — ENCOUNTER SYMPTOMS
HEMATURIA: 0
EYE PAIN: 0
DIZZINESS: 0
MYALGIAS: 1
JOINT SWELLING: 0
HEADACHES: 0
DIARRHEA: 0
ARTHRALGIAS: 1
FEVER: 0
HEMATOCHEZIA: 0
ABDOMINAL PAIN: 0
FREQUENCY: 0
CHILLS: 0
HEARTBURN: 0
NERVOUS/ANXIOUS: 1
SHORTNESS OF BREATH: 1
PALPITATIONS: 0
BREAST MASS: 0
PARESTHESIAS: 0
CONSTIPATION: 0
WEAKNESS: 1
SORE THROAT: 0
COUGH: 0
DYSURIA: 0
NAUSEA: 0

## 2022-06-08 NOTE — LETTER
June 8, 2022      Brenda Renee  [address] [place] [address]        To Whom It May Concern:      Brenda Renee was seen in our clinic. She may return to work with the following: llimited to two 6 hour workday shifts per week for 3 weeks with no lifting greater than 30 lbs if unable and will then be reevaluated to see if restrictions can be removed.      Sincerely,          Chasidy Bajwa PA-C

## 2022-06-08 NOTE — PROGRESS NOTES
SUBJECTIVE:   CC: Brenda Renee is an 52 year old woman who presents for preventive health visit.       Patient has been advised of split billing requirements and indicates understanding: Yes  Healthy Habits:     Getting at least 3 servings of Calcium per day:  NO    Bi-annual eye exam:  Yes    Dental care twice a year:  Yes    Sleep apnea or symptoms of sleep apnea:  Daytime drowsiness    Diet:  Low salt and Diabetic    Frequency of exercise:  None    Taking medications regularly:  No    Barriers to taking medications:  Problems remembering to take them    Medication side effects:  Muscle aches and Other    PHQ-2 Total Score: 6    Additional concerns today:  No    Due for vaccines, labs done earlier this week.      Today's PHQ-2 Score:   PHQ-2 ( 1999 Pfizer) 6/8/2022   Q1: Little interest or pleasure in doing things 3   Q2: Feeling down, depressed or hopeless 3   PHQ-2 Score 6   PHQ-2 Total Score (12-17 Years)- Positive if 3 or more points; Administer PHQ-A if positive -   Q1: Little interest or pleasure in doing things Nearly every day   Q2: Feeling down, depressed or hopeless Nearly every day   PHQ-2 Score 6       Abuse: Current or Past (Physical, Sexual or Emotional) - No  Do you feel safe in your environment? Yes      Social History     Tobacco Use     Smoking status: Never Smoker     Smokeless tobacco: Never Used   Substance Use Topics     Alcohol use: Yes     Comment: occasionally       Alcohol Use 6/8/2022   Prescreen: >3 drinks/day or >7 drinks/week? No   Prescreen: >3 drinks/day or >7 drinks/week? -       Reviewed orders with patient.  Reviewed health maintenance and updated orders accordingly - Yes  BP Readings from Last 3 Encounters:   06/08/22 136/76   06/06/22 128/72   12/08/21 138/80    Wt Readings from Last 3 Encounters:   06/08/22 118.3 kg (260 lb 12.8 oz)   06/06/22 118.7 kg (261 lb 11.2 oz)   12/08/21 112 kg (247 lb)             Recent Labs   Lab Test 06/06/22  1122 12/08/21  0859  09/01/21  1127 03/03/21  1200 02/17/21  1211 03/02/20  1110 12/09/19  1113 09/09/19  1428 10/10/18  1145 08/31/18  0838   A1C 8.8* 8.5* 6.5*   < > 12.3*   < > 6.9* 7.1*   < > 14.0*   LDL 32  --   --   --  86  --   --  69  --  88   HDL 49*  --   --   --  53  --   --  43*  --  48*   TRIG 119  --   --   --  103  --   --  86  --  134   ALT 40  --  43  --  97*  --  57*  --    < > 123*   CR 0.84  --   --   --  0.78  --  0.72  --    < > 0.70   GFRESTIMATED 83  --   --   --  88  --  >90  --    < > 89   GFRESTBLACK  --   --   --   --  >90  --  >90  --    < > >90   POTASSIUM 4.6  --   --   --  4.8  --  4.1  --    < > 4.1   TSH  --   --   --   --  1.71  --   --   --   --  2.26    < > = values in this interval not displayed.        Breast Cancer Screening:  Any new diagnosis of family breast, ovarian, or bowel cancer? No    FHS-7: No flowsheet data found.    Mammogram Screening: Recommended annual mammography  Pertinent mammograms are reviewed under the imaging tab.    History of abnormal Pap smear: NO - age 30-65 PAP every 5 years with negative HPV co-testing recommended     Reviewed and updated as needed this visit by clinical staff   Tobacco  Allergies  Meds  Problems  Med Hx  Surg Hx  Fam Hx  Soc   Hx          Reviewed and updated as needed this visit by Provider   Tobacco  Allergies  Meds  Problems  Med Hx  Surg Hx  Fam Hx             Review of Systems   Constitutional: Negative for chills and fever.   HENT: Negative for congestion, ear pain, hearing loss and sore throat.    Eyes: Positive for visual disturbance. Negative for pain.   Respiratory: Positive for shortness of breath. Negative for cough.    Cardiovascular: Positive for peripheral edema. Negative for chest pain and palpitations.   Gastrointestinal: Negative for abdominal pain, constipation, diarrhea, heartburn, hematochezia and nausea.   Breasts:  Negative for tenderness, breast mass and discharge.   Genitourinary: Positive for urgency. Negative  "for dysuria, frequency, genital sores, hematuria, pelvic pain, vaginal bleeding and vaginal discharge.   Musculoskeletal: Positive for arthralgias and myalgias. Negative for joint swelling.   Skin: Negative for rash.   Neurological: Positive for weakness. Negative for dizziness, headaches and paresthesias.   Psychiatric/Behavioral: Positive for mood changes. The patient is nervous/anxious.       OBJECTIVE:   /76 (BP Location: Right arm, Patient Position: Sitting, Cuff Size: Adult Large)   Pulse 76   Temp 98.2  F (36.8  C) (Oral)   Resp 18   Ht 1.638 m (5' 4.5\")   Wt 118.3 kg (260 lb 12.8 oz)   SpO2 98%   BMI 44.07 kg/m    Physical Exam  GENERAL APPEARANCE: healthy, alert and no distress  EYES: Eyes grossly normal to inspection, PERRL and conjunctivae and sclerae normal  HENT: ear canals and TM's normal, nose and mouth without ulcers or lesions, oropharynx clear and oral mucous membranes moist  NECK: no adenopathy, no asymmetry, masses, or scars and thyroid normal to palpation  RESP: lungs clear to auscultation - no rales, rhonchi or wheezes  CV: regular rate and rhythm, normal S1 S2, no S3 or S4, no murmur, click or rub, no peripheral edema and peripheral pulses strong  ABDOMEN: soft, nontender, no hepatosplenomegaly, no masses and bowel sounds normal  MS: no musculoskeletal defects are noted and gait is age appropriate without ataxia  SKIN: no suspicious lesions or rashes  NEURO: Normal strength and tone, sensory exam grossly normal, mentation intact and speech normal  PSYCH: mentation appears normal and affect normal/bright    ASSESSMENT/PLAN:       ICD-10-CM    1. Routine history and physical examination of adult  Z00.00 Vitamin D Deficiency     CRP inflammation     Cyclic Citrullinated Peptide Antibody IgG   2. Type 2 diabetes mellitus with hyperglycemia, without long-term current use of insulin (H)  E11.65    3. Need for vaccination  Z23        Patient has been advised of split billing requirements " "and indicates understanding: Yes    COUNSELING:  Reviewed preventive health counseling, as reflected in patient instructions    Estimated body mass index is 44.07 kg/m  as calculated from the following:    Height as of this encounter: 1.638 m (5' 4.5\").    Weight as of this encounter: 118.3 kg (260 lb 12.8 oz).    Weight management plan: Discussed healthy diet and exercise guidelines    She reports that she has never smoked. She has never used smokeless tobacco.      Counseling Resources:  ATP IV Guidelines  Pooled Cohorts Equation Calculator  Breast Cancer Risk Calculator  BRCA-Related Cancer Risk Assessment: FHS-7 Tool  FRAX Risk Assessment  ICSI Preventive Guidelines  Dietary Guidelines for Americans, 2010  Spreedly's MyPlate  ASA Prophylaxis  Lung CA Screening    Chasidy Bajwa PA-C  [facility]  Answers for HPI/ROS submitted by the patient on 6/8/2022  If you checked off any problems, how difficult have these problems made it for you to do your work, take care of things at home, or get along with other people?: Extremely difficult  PHQ9 TOTAL SCORE: 20      "

## 2022-06-08 NOTE — PATIENT INSTRUCTIONS
[facility] [place] starting Sept 12th 2022.      Preventive Health Recommendations  Female Ages 50 - 64    Yearly exam: See your health care provider every year in order to  Review health changes.   Discuss preventive care.    Review your medicines if your doctor has prescribed any.    Get a Pap test every three years (unless you have an abnormal result and your provider advises testing more often).  If you get Pap tests with HPV test, you only need to test every 5 years, unless you have an abnormal result.   You do not need a Pap test if your uterus was removed (hysterectomy) and you have not had cancer.  You should be tested each year for STDs (sexually transmitted diseases) if you're at risk.   Have a mammogram every 1 to 2 years.  Have a colonoscopy at age 50, or have a yearly FIT test (stool test). These exams screen for colon cancer.    Have a cholesterol test every 5 years, or more often if advised.  Have a diabetes test (fasting glucose) every three years. If you are at risk for diabetes, you should have this test more often.   If you are at risk for osteoporosis (brittle bone disease), think about having a bone density scan (DEXA).    Shots: Get a flu shot each year. Get a tetanus shot every 10 years.    Nutrition:   Eat at least 5 servings of fruits and vegetables each day.  Eat whole-grain bread, whole-wheat pasta and brown rice instead of white grains and rice.  Get adequate Calcium and Vitamin D.     Lifestyle  Exercise at least 150 minutes a week (30 minutes a day, 5 days a week). This will help you control your weight and prevent disease.  Limit alcohol to one drink per day.  No smoking.   Wear sunscreen to prevent skin cancer.   See your dentist every six months for an exam and cleaning.  See your eye doctor every 1 to 2 years.

## 2022-06-09 LAB — CCP AB SER IA-ACNC: 3.8 U/ML

## 2022-06-09 NOTE — RESULT ENCOUNTER NOTE
Carl Gutierrez,    I just wanted to let you know that your lab results have been reviewed and are attached.    Labs in normal range, including CRP which is likely slightly elevated due to high blood sugar.    Please let me know if you have any questions and have a great week!    Sincerely,    Julienne Bajwa PA-C    [facility]  [address] [place] [address]  Clinic Phone: [phone]

## 2022-06-20 DIAGNOSIS — E11.65 TYPE 2 DIABETES MELLITUS WITH HYPERGLYCEMIA, WITHOUT LONG-TERM CURRENT USE OF INSULIN (H): ICD-10-CM

## 2022-06-20 RX ORDER — BLOOD SUGAR DIAGNOSTIC
STRIP MISCELLANEOUS
Qty: 200 STRIP | Refills: 2 | Status: SHIPPED | OUTPATIENT
Start: 2022-06-20 | End: 2022-06-27

## 2022-06-24 ENCOUNTER — TELEPHONE (OUTPATIENT)
Dept: PHYSICAL MEDICINE AND REHAB | Facility: CLINIC | Age: 52
End: 2022-06-24

## 2022-06-27 ENCOUNTER — MYC MEDICAL ADVICE (OUTPATIENT)
Dept: PHYSICAL MEDICINE AND REHAB | Facility: CLINIC | Age: 52
End: 2022-06-27

## 2022-06-27 ENCOUNTER — TELEPHONE (OUTPATIENT)
Dept: FAMILY MEDICINE | Facility: CLINIC | Age: 52
End: 2022-06-27

## 2022-06-27 DIAGNOSIS — M54.2 NECK PAIN: Chronic | ICD-10-CM

## 2022-06-27 DIAGNOSIS — I10 ESSENTIAL HYPERTENSION WITH GOAL BLOOD PRESSURE LESS THAN 140/90: Primary | ICD-10-CM

## 2022-06-27 DIAGNOSIS — Z13.9 SCREENING FOR CONDITION: ICD-10-CM

## 2022-06-27 DIAGNOSIS — E11.65 TYPE 2 DIABETES MELLITUS WITH HYPERGLYCEMIA, WITHOUT LONG-TERM CURRENT USE OF INSULIN (H): ICD-10-CM

## 2022-06-27 DIAGNOSIS — M79.18 MYOFASCIAL PAIN: ICD-10-CM

## 2022-06-27 RX ORDER — COVID-19 ANTIGEN TEST
1 KIT MISCELLANEOUS PRN
Qty: 1 KIT | Refills: 5 | Status: SHIPPED | OUTPATIENT
Start: 2022-06-27 | End: 2022-09-09

## 2022-06-27 RX ORDER — BLOOD SUGAR DIAGNOSTIC
STRIP MISCELLANEOUS
Qty: 200 STRIP | Refills: 2 | Status: SHIPPED | OUTPATIENT
Start: 2022-06-27

## 2022-06-27 RX ORDER — HYDROCODONE BITARTRATE AND ACETAMINOPHEN 5; 325 MG/1; MG/1
1 TABLET ORAL EVERY 6 HOURS PRN
Qty: 40 TABLET | Refills: 0 | Status: SHIPPED | OUTPATIENT
Start: 2022-07-05

## 2022-06-27 NOTE — TELEPHONE ENCOUNTER
Pt LM on PAL VM     Pt would like testing time changed to BID vs TID so that she can  at local pharmacy vs mail order.     She would like RX for COVID home test so that it will be covered by insurance.     Also asking for RF on hydrocodone - so that she medication between time while provider est care in new clinic.     Pt states BS have been between 129-180 after eating.  She is eating much better.          Penelope Hernandez, RN

## 2022-06-27 NOTE — TELEPHONE ENCOUNTER
Signed orders, thank you for pending orders.    Hydrocodone order placed for July 5 as she got this last June 6th.

## 2022-06-29 RX ORDER — LOSARTAN POTASSIUM AND HYDROCHLOROTHIAZIDE 12.5; 1 MG/1; MG/1
1 TABLET ORAL DAILY
Qty: 90 TABLET | Refills: 1 | Status: SHIPPED | OUTPATIENT
Start: 2022-06-29 | End: 2022-11-22

## 2022-06-29 NOTE — TELEPHONE ENCOUNTER
HOLD FOR PCP     Pt would like to have combo med for future RF she feels that the hydrochlorothiazide is working well and would like to take as one pill going forward.     Penelope Hernandez RN

## 2022-08-08 ENCOUNTER — TELEPHONE (OUTPATIENT)
Dept: FAMILY MEDICINE | Facility: CLINIC | Age: 52
End: 2022-08-08

## 2022-08-08 NOTE — TELEPHONE ENCOUNTER
blood glucose (ONETOUCH VERIO IQ) test strip 200 strip 2 6/27/2022  No   Sig: USE TO TEST BLOOD SUGAR 2 TIMES DAILY OR AS DIRECTED.   Sent to pharmacy as: OneTouch Verio In Vitro Strip (blood glucose)   Class: E-Prescribe   Order: [id number]   E-Prescribing Status: Receipt confirmed by pharmacy (6/27/2022  1:00 PM CDT)     Claudette Laguna RN

## 2022-08-08 NOTE — TELEPHONE ENCOUNTER
Reason for Call:  Other prescription, prior authorization    Detailed comments: Pt spoke with pharmacy tech, who said they don't do PAs    RN (Claudette Laguna) said pharmacy needs to send PA to [facility]    For quantity (2/day) test strips, needs mail order or PA due to not being on insulin. Pharmacy needs proof of being diabetic/diabetic testing from [facility]. Pt refuses to use mail order due to unstable mailing/past mailing issues. She notes that she wants refill sent to pharmacy, and have close connection with pharmacy/pharmacist.    So far pt has been unable to get straight answer between pharmacy, clinic nurse, and insurance regarding who needs to start PA process    Pt told pharmacy to try contacting Nicolasa before regarding refill request back in June, not knowing he was leaving [facility] in July      Phone Number Patient can be reached at: Cell number on file:    Telephone Information:   Mobile [phone]       Best Time: Anytime    Can we leave a detailed message on this number? YES    Call taken on 8/8/2022 at 4:33 PM by Sherrill Rachel

## 2022-08-09 NOTE — TELEPHONE ENCOUNTER
Prior Authorization Not Needed per Insurance    Medication: Blood Glucose Testing Strips  Insurance Company: [employer] ([facility]) - Phone [phone] Fax [phone]  Expected CoPay:      Pharmacy [facility]illing the [facility] [id number] [place] [address]  Pharmacy Notified: Yes  Patient Notified: Yes  **Instructed pharmacy to notify patient when script is ready to /ship.**

## 2022-08-09 NOTE — TELEPHONE ENCOUNTER
Central Prior Authorization Team   Phone: [phone]    PA Initiation    Medication: Blood Glucose Testing Strips  Insurance Company: [employer] ([facility]) - Phone [phone] Fax [phone]  Pharmacy [facility]illing the Rx[id number] [place], [place] - [address]  Filling Pharmacy Phone: [phone]  Filling Pharmacy Fax:    Start Date: 8/9/2022

## 2022-08-24 ENCOUNTER — VIRTUAL VISIT (OUTPATIENT)
Dept: PHYSICAL MEDICINE AND REHAB | Facility: CLINIC | Age: 52
End: 2022-08-24
Payer: COMMERCIAL

## 2022-08-24 VITALS — WEIGHT: 245 LBS | BODY MASS INDEX: 40.82 KG/M2 | HEIGHT: 65 IN

## 2022-08-24 DIAGNOSIS — R43.0 LOSS OF SMELL: ICD-10-CM

## 2022-08-24 DIAGNOSIS — Z20.822 EXPOSURE TO 2019 NOVEL CORONAVIRUS: ICD-10-CM

## 2022-08-24 DIAGNOSIS — R41.3 MEMORY LOSS: ICD-10-CM

## 2022-08-24 DIAGNOSIS — F39 MOOD DISORDER (H): Primary | ICD-10-CM

## 2022-08-24 PROCEDURE — 99204 OFFICE O/P NEW MOD 45 MIN: CPT | Mod: TEL | Performed by: PHYSICAL MEDICINE & REHABILITATION

## 2022-08-24 SDOH — SOCIAL STABILITY: SOCIAL NETWORK: I HAVE TROUBLE DOING ALL OF THE ACTIVITIES WITH FRIENDS THAT I WANT TO DO: ALWAYS

## 2022-08-24 SDOH — SOCIAL STABILITY: SOCIAL NETWORK: I HAVE TROUBLE DOING ALL OF MY REGULAR LEISURE ACTIVITIES WITH OTHERS: ALWAYS

## 2022-08-24 SDOH — SOCIAL STABILITY: SOCIAL NETWORK: I HAVE TROUBLE DOING ALL OF THE FAMILY ACTIVITIES THAT I WANT TO DO: ALWAYS

## 2022-08-24 SDOH — SOCIAL STABILITY: SOCIAL NETWORK: PROMIS ABILITY TO PARTICIPATE IN SOCIAL ROLES & ACTIVITIES T-SCORE: 33.6

## 2022-08-24 SDOH — SOCIAL STABILITY: SOCIAL NETWORK: I HAVE TROUBLE DOING ALL OF MY USUAL WORK (INCLUDE WORK AT HOME): USUALLY

## 2022-08-24 SDOH — SOCIAL STABILITY: SOCIAL NETWORK

## 2022-08-24 ASSESSMENT — ANXIETY QUESTIONNAIRES
7. FEELING AFRAID AS IF SOMETHING AWFUL MIGHT HAPPEN: NEARLY EVERY DAY
1. FEELING NERVOUS, ANXIOUS, OR ON EDGE: NEARLY EVERY DAY
GAD7 TOTAL SCORE: 18
2. NOT BEING ABLE TO STOP OR CONTROL WORRYING: NEARLY EVERY DAY
8. IF YOU CHECKED OFF ANY PROBLEMS, HOW DIFFICULT HAVE THESE MADE IT FOR YOU TO DO YOUR WORK, TAKE CARE OF THINGS AT HOME, OR GET ALONG WITH OTHER PEOPLE?: EXTREMELY DIFFICULT
3. WORRYING TOO MUCH ABOUT DIFFERENT THINGS: NEARLY EVERY DAY
IF YOU CHECKED OFF ANY PROBLEMS ON THIS QUESTIONNAIRE, HOW DIFFICULT HAVE THESE PROBLEMS MADE IT FOR YOU TO DO YOUR WORK, TAKE CARE OF THINGS AT HOME, OR GET ALONG WITH OTHER PEOPLE: EXTREMELY DIFFICULT
GAD7 TOTAL SCORE: 18
5. BEING SO RESTLESS THAT IT IS HARD TO SIT STILL: SEVERAL DAYS
4. TROUBLE RELAXING: NEARLY EVERY DAY
GAD7 TOTAL SCORE: 18
6. BECOMING EASILY ANNOYED OR IRRITABLE: MORE THAN HALF THE DAYS
7. FEELING AFRAID AS IF SOMETHING AWFUL MIGHT HAPPEN: NEARLY EVERY DAY

## 2022-08-24 ASSESSMENT — ENCOUNTER SYMPTOMS
FEVER: 0
COUGH: 0
ORTHOPNEA: 1
NERVOUS/ANXIOUS: 1
JOINT SWELLING: 0
SNORES LOUDLY: 0
COUGH DISTURBING SLEEP: 0
NECK PAIN: 0
WEIGHT GAIN: 0
SHORTNESS OF BREATH: 1
POLYDIPSIA: 0
POSTURAL DYSPNEA: 0
WHEEZING: 0
BACK PAIN: 0
HEMOPTYSIS: 0
MYALGIAS: 1
MUSCLE CRAMPS: 0
ARTHRALGIAS: 1
MUSCLE WEAKNESS: 0
POLYPHAGIA: 0
STIFFNESS: 0
INCREASED ENERGY: 1
SYNCOPE: 0
PALPITATIONS: 1
ALTERED TEMPERATURE REGULATION: 0
LEG PAIN: 0
NIGHT SWEATS: 0
DECREASED CONCENTRATION: 1
SLEEP DISTURBANCES DUE TO BREATHING: 0
DEPRESSION: 1
WEIGHT LOSS: 0
DYSPNEA ON EXERTION: 1
FATIGUE: 1
DECREASED APPETITE: 0
HYPOTENSION: 0
CHILLS: 0
INSOMNIA: 1
SPUTUM PRODUCTION: 0
PANIC: 0
HALLUCINATIONS: 0
LIGHT-HEADEDNESS: 0
EXERCISE INTOLERANCE: 0
HYPERTENSION: 0

## 2022-08-24 ASSESSMENT — PATIENT HEALTH QUESTIONNAIRE - PHQ9
SUM OF ALL RESPONSES TO PHQ QUESTIONS 1-9: 19
10. IF YOU CHECKED OFF ANY PROBLEMS, HOW DIFFICULT HAVE THESE PROBLEMS MADE IT FOR YOU TO DO YOUR WORK, TAKE CARE OF THINGS AT HOME, OR GET ALONG WITH OTHER PEOPLE: EXTREMELY DIFFICULT
SUM OF ALL RESPONSES TO PHQ QUESTIONS 1-9: 19

## 2022-08-24 ASSESSMENT — PAIN SCALES - GENERAL: PAINLEVEL: MILD PAIN (3)

## 2022-08-24 NOTE — PROGRESS NOTES
Brenda is a 52 year old who is being evaluated via a billable telephone visit.      What phone number would you like to be contacted at? [phone]  How would you like to obtain your AVS? MyChart  Answers for HPI/ROS submitted by the patient on 8/24/2022  If you checked off any problems, how difficult have these problems made it for you to do your work, take care of things at home, or get along with other people?: Extremely difficult  PHQ9 TOTAL SCORE: 19  TITO 7 TOTAL SCORE: 18  General Symptoms: Yes  Skin Symptoms: No  HENT Symptoms: No  EYE SYMPTOMS: No  HEART SYMPTOMS: Yes  LUNG SYMPTOMS: Yes  INTESTINAL SYMPTOMS: No  URINARY SYMPTOMS: No  GYNECOLOGIC SYMPTOMS: No  BREAST SYMPTOMS: No  SKELETAL SYMPTOMS: Yes  BLOOD SYMPTOMS: No  NERVOUS SYSTEM SYMPTOMS: No  MENTAL HEALTH SYMPTOMS: Yes  Fever: No  Loss of appetite: No  Weight loss: No  Weight gain: No  Fatigue: Yes  Night sweats: No  Chills: No  Increased stress: Yes  Excessive hunger: No  Excessive thirst: No  Feeling hot or cold when others believe the temperature is normal: No  Loss of height: No  Post-operative complications: No  Surgical site pain: No  Hallucinations: No  Change in or Loss of Energy: Yes  Hyperactivity: No  Confusion: No  Chest pain or pressure: No  Fast or irregular heartbeat: Yes  Pain in legs with walking: No  Trouble breathing while lying down: Yes  Fingers or toes appear blue: No  High blood pressure: No  Low blood pressure: No  Fainting: No  Murmurs: No  Pacemaker: No  Varicose veins: No  Edema or swelling: No  Wake up at night with shortness of breath: No  Light-headedness: No  Exercise intolerance: No  Cough: No  Sputum or phlegm: No  Coughing up blood: No  Difficulty breating or shortness of breath: Yes  Snoring: No  Wheezing: No  Difficulty breathing on exertion: Yes  Nighttime Cough: No  Difficulty breathing when lying flat: No  Back pain: No  Muscle aches: Yes  Neck pain: No  Swollen joints: No  Joint pain: Yes  Bone pain:  No  Muscle cramps: No  Muscle weakness: No  Joint stiffness: No  Bone fracture: No  Nervous or Anxious: Yes  Depression: Yes  Trouble sleeping: Yes  Trouble thinking or concentrating: Yes  Mood changes: Yes  Panic attacks: No             PM&R Clinic Note     Patient Name: Brenda Renee : 1970 Medical Record: [id number]     Requesting Physician/clinician: No att. providers found           History of Present Illness:     Brenda Renee is a 52 year old female referred to the Adult Post-Covid clinic for long-term symptoms after COVID-19 illness.  She was initially diagnosed with COVID 2021.  Her symptoms included loss of taste and smell, fever, body aches, eye pain, photosensitivity, and fatigue.  She continues to experience loss of taste and smell and brain fog, joint pain, difficulty controlling blood sugar, and incontinence.  She has not seen anyone for the loss of taste/smell and has not had neuropsych testing for brain fog.  She reports depressive symptoms today and she is not receiving any treatment for this.      Regular Exercise:  None    PHQ 2021   PHQ-9 Total Score 14 20 19   Q9: Thoughts of better off dead/self-harm past 2 weeks Not at all Not at all Not at all            Past Medical and Surgical History:     Past Medical History:   Diagnosis Date     Arthritis      Chronic back pain      Chronic neck pain      Hypertension      Microhematuria      Obesity      Rectal tear      Past Surgical History:   Procedure Laterality Date     BREAST BIOPSY, RT/LT  11/10/2008 Abbott/Piper - negative, 2010 Abbott/Shruthi benign    right x 2 benign     COLONOSCOPY  2013    Procedure: COLONOSCOPY;  Colonoscopy;  Surgeon: Veronica Meyers MD;  Location:  GI     HC TOOTH EXTRACTION W/FORCEP      wisdom teeth     TONSILLECTOMY              Social History:     Social History     Tobacco Use     Smoking status: Never Smoker     Smokeless tobacco: Never Used    Substance Use Topics     Alcohol use: Yes     Comment: occasionally       Marital Status: Has a boyfriend, no children  Living situation: Lives with boyfriend  Family support: no family in area  Vocational History: Works as a   Tobacco use: Never  Alcohol use: occasionally  Recreational drug use: none         Functional history:     Brenda Renee is independent with all aspects of  life.         Family History:     Family History   Problem Relation Age of Onset     Hypertension Mother      Arthritis Mother      Arrhythmia Mother      Hypertension Father      Cerebrovascular Disease Father      Liver Disease Brother         infection that caused liver problems     Arthritis Sister      Diabetes Sister      Neurologic Disorder Sister      Family History Negative Brother      Polycystic ovary syndrome Sister      ALS Other         Great aunt on paternal side     Cancer Maternal Aunt      Autism Spectrum Disorder Nephew      Multiple Sclerosis Paternal Uncle      Pancreatic Cancer Paternal Grandfather      Cancer Paternal Aunt      Cancer Maternal Aunt             Medications:     Current Outpatient Medications   Medication Sig Dispense Refill     albuterol (PROAIR HFA) 108 (90 Base) MCG/ACT inhaler Inhale 2 puffs into the lungs every 6 hours as needed 18 g 3     aspirin (ASA) 81 MG chewable tablet Take 81 mg by mouth daily       blood glucose (NO BRAND SPECIFIED) lancets standard Use to test blood sugar 3 times daily or as directed. 270 each 1     blood glucose (ONETOUCH VERIO IQ) test strip USE TO TEST BLOOD SUGAR 2 TIMES DAILY OR AS DIRECTED. 200 strip 2     Continuous Blood Gluc Sensor (FREESTYLE INESSA 14 DAY SENSOR) MISC 1 kit every 14 days 2 each 11     COVID-19 At Home Antigen Test KIT 1 each by In Vitro route as needed (when having symptoms or exposure ONLY, tests max 1 per week) 1 kit 5     cyclobenzaprine (FLEXERIL) 10 MG tablet Take 1 tablet (10 mg) by mouth 3 times daily as needed 30 tablet 5  "    dulaglutide (TRULICITY) 0.75 MG/0.5ML pen Inject 0.75 mg Subcutaneous every 7 days 6 mL 1     fexofenadine (ALLEGRA) 180 MG tablet Take 1 tablet by mouth daily.       folic acid (FOLVITE) 400 MCG tablet Take 1 tablet (400 mcg) by mouth daily 90 tablet 3     hydrochlorothiazide (HYDRODIURIL) 12.5 MG tablet Take 1 tablet (12.5 mg) by mouth daily 90 tablet 0     HYDROcodone-acetaminophen (NORCO) 5-325 MG tablet Take 1 tablet by mouth every 6 hours as needed for moderate to severe pain 40 tablet 0     ibuprofen 200 MG capsule Take 200 mg by mouth as needed        losartan (COZAAR) 100 MG tablet Take 1 tablet (100 mg) by mouth daily 90 tablet 2     metFORMIN (GLUCOPHAGE XR) 500 MG 24 hr tablet TAKE 2 TABLETS BY MOUTH 2 TIMES DAILY WITH MEALS 360 tablet 1     OneTouch Delica Lancets 30G MISC USE TO TEST BLOOD SUGAR 3 TIMES DAILY OR AS DIRECTED. 300 each 1     Ostomy Supplies (SKIN TAC ADHESIVE BARRIER WIPE) MISC 1 pad every 14 days 50 each 3     pioglitazone (ACTOS) 15 MG tablet Take 1 tablet (15 mg) by mouth daily 90 tablet 1     rosuvastatin (CRESTOR) 5 MG tablet Take 1 tab PO DAILY 90 tablet 4     amphetamine-dextroamphetamine (ADDERALL XR) 5 MG 24 hr capsule Take 1 capsule (5 mg) by mouth daily (Patient not taking: Reported on 8/24/2022) 90 capsule 0     LIDODERM 5 % patch  (Patient not taking: Reported on 8/24/2022)       losartan-hydrochlorothiazide (HYZAAR) 100-12.5 MG tablet Take 1 tablet by mouth daily (Patient not taking: Reported on 8/24/2022) 90 tablet 1            Allergies:     Allergies   Allergen Reactions     Iodine I 131 Tositumomab Rash     Latex Rash     Sulfa Drugs Itching     Xray Dye [Contrast Dye] Itching     Zithromax [Azithromycin Dihydrate] Rash              ROS:     A focused ROS is negative other than the symptoms noted above in the HPI.           Physical Examiniation:     VITAL SIGNS: Ht 1.638 m (5' 4.5\")   Wt 111.1 kg (245 lb)   BMI 41.40 kg/m    BMI: Estimated body mass index is 41.4 " "kg/m  as calculated from the following:    Height as of this encounter: 1.638 m (5' 4.5\").    Weight as of this encounter: 111.1 kg (245 lb).    Gen: NAD, pleasant and cooperative          Laboratory/Imaging:     Ref Range & Units 2 mo ago       Cyclic Citrullinated Peptide Antibody IgG <7.0 U/mL 3.8    Comment: Negative   Resulting Agency  [facility]      1 yr ago 3 yr ago 5 yr ago       CRP Inflammation 0.0 - 8.0 mg/L 9.7 High   21.0 High   25.0 High   12.0 High     Resulting Agency  [facility]          Component Ref Range & Units 2 mo ago 1 yr ago 3 yr ago    Vitamin D, Total (25-Hydroxy) 20 - 75 ug/L 47  >155 High  CM  36 CM    Resulting Agency  [facility]             Narrative          Component Ref Range & Units 2 mo ago 1 yr ago 2 yr ago 3 yr ago     Creatinine Urine mg/dL mg/dL 176  33  80  79     Albumin Urine mg/L mg/L 12  <5  6  17     Albumin Urine mg/g Cr 0.00 - 25.00 mg/g Cr 6.82  Unable to calculate due to low value R  7.55 R  21.09 R    Resulting Agency  [facility]              Specimen Collected: 06/06/22 11:22 AM Last Resulted: 06/06/22  4:42 PM            Ref Range & Units 2 mo ago   (6/6/22) 8 mo ago   (12/8/21) 11 mo ago   (9/1/21) 1 yr ago   (6/2/21) 1 yr ago   (3/3/21) 1 yr ago   (2/17/21) 1 yr ago   (9/11/20)     Hemoglobin A1C 0.0 - 5.6 % 8.8 High   8.5 High  CM  6.5 High  CM  8.4 High  R, CM  11.7 High  R, CM  12.3 High  R, CM  8.4 High  R, CM    Comment: Normal <5.7%   Prediabetes 5.7-6.4%     Diabetes 6.5% or higher                 Assessment/Plan:     This is a 51 YO female with long-term symptoms related to COVID infection (1/2021) who presents today for assessment of her brain fog, depressive symptoms, and " loss of taste/smell.  I've placed orders for ENT for taste/smell assessment, behavioral health for depressive symptoms, and neurospych evaluation to assess memory.  RTC after testing to review results.  She is in agreement with this plan.       Mary Mathew, DO  Physical Medicine & Rehabilitation    I spent a total of 26 minutes one the phone with Brenda Renee during today's office visit.     20 minutes spent on the date of the encounter doing chart review, history and exam, documentation and further activities as noted above

## 2022-08-24 NOTE — NURSING NOTE
Allergies and medications reviewed with patient.  Patient has family history of RA, tested negative. Family history of Afib, has not been tested for this. She is only able to work part-time right now but would like to be back full-time to get benefits again. She also stated she does know why Iodine is listed as an allergy, she does not recall this. She is taking Vitamin D, black elderberry and fiber supplements. She is only sleeping for 2-3 hours at a time. She is really struggling with brain fog and mental health concerns.   Manda Manuel, [place]

## 2022-08-24 NOTE — LETTER
8/24/2022       RE: Brenda Renee  [address] [place] [address]     Dear Colleague,    Thank you for referring your patient, Brenda Renee, to the [facility] [place] [facility]. Please see a copy of my visit note below.    Answers for HPI/ROS submitted by the patient on 8/24/2022  If you checked off any problems, how difficult have these problems made it for you to do your work, take care of things at home, or get along with other people?: Extremely difficult  PHQ9 TOTAL SCORE: 19  TITO 7 TOTAL SCORE: 18  General Symptoms: Yes  Skin Symptoms: No  HENT Symptoms: No  EYE SYMPTOMS: No  HEART SYMPTOMS: Yes  LUNG SYMPTOMS: Yes  INTESTINAL SYMPTOMS: No  URINARY SYMPTOMS: No  GYNECOLOGIC SYMPTOMS: No  BREAST SYMPTOMS: No  SKELETAL SYMPTOMS: Yes  BLOOD SYMPTOMS: No  NERVOUS SYSTEM SYMPTOMS: No  MENTAL HEALTH SYMPTOMS: Yes  Fever: No  Loss of appetite: No  Weight loss: No  Weight gain: No  Fatigue: Yes  Night sweats: No  Chills: No  Increased stress: Yes  Excessive hunger: No  Excessive thirst: No  Feeling hot or cold when others believe the temperature is normal: No  Loss of height: No  Post-operative complications: No  Surgical site pain: No  Hallucinations: No  Change in or Loss of Energy: Yes  Hyperactivity: No  Confusion: No  Chest pain or pressure: No  Fast or irregular heartbeat: Yes  Pain in legs with walking: No  Trouble breathing while lying down: Yes  Fingers or toes appear blue: No  High blood pressure: No  Low blood pressure: No  Fainting: No  Murmurs: No  Pacemaker: No  Varicose veins: No  Edema or swelling: No  Wake up at night with shortness of breath: No  Light-headedness: No  Exercise intolerance: No  Cough: No  Sputum or phlegm: No  Coughing up blood: No  Difficulty breating or shortness of breath: Yes  Snoring: No  Wheezing: No  Difficulty breathing on exertion: Yes  Nighttime Cough:  No  Difficulty breathing when lying flat: No  Back pain: No  Muscle aches: Yes  Neck pain: No  Swollen joints: No  Joint pain: Yes  Bone pain: No  Muscle cramps: No  Muscle weakness: No  Joint stiffness: No  Bone fracture: No  Nervous or Anxious: Yes  Depression: Yes  Trouble sleeping: Yes  Trouble thinking or concentrating: Yes  Mood changes: Yes  Panic attacks: No             PM&R Clinic Note     Patient Name: Brenda Renee : 1970 Medical Record: [id number]     Requesting Physician/clinician: No att. providers found           History of Present Illness:     Brenda Renee is a 52 year old female referred to the Adult Post-Covid clinic by Dr. MORA for long-term symptoms after COVID-19 illness.  She was initially diagnosed with COVID 2021.  Her symptoms included loss of taste and smell, fever, body aches, eye pain, photosensitivity, and fatigue.  She continues to experience loss of taste and smell and brain fog, joint pain, difficulty controlling blood sugar, and incontinence.  She has not seen anyone for the loss of taste/smell and has not had neuropsych testing for brain fog.  She reports depressive symptoms today and she is not receiving any treatment for this.    Initial Diagnosis:  2021  Initial Symptoms:    Current Symptoms:    PT/OT/Pulmonary Rehab:  Neuropsych?  GI symptoms?    Vaccination?      Regular Exercise:  None    PHQ 2021   PHQ-9 Total Score 14 20 19   Q9: Thoughts of better off dead/self-harm past 2 weeks Not at all Not at all Not at all            Past Medical and Surgical History:     Past Medical History:   Diagnosis Date     Arthritis      Chronic back pain      Chronic neck pain      Hypertension      Microhematuria      Obesity      Rectal tear      Past Surgical History:   Procedure Laterality Date     BREAST BIOPSY, RT/LT  11/10/2008 Abbott/Piper - negative, 2010 Abbott/Piper benign    right x 2 benign     COLONOSCOPY  2013    Procedure:  COLONOSCOPY;  Colonoscopy;  Surgeon: Veronica Meyers MD;  Location: [facility]     HC TOOTH EXTRACTION W/FORCEP  1999    wisdom teeth     TONSILLECTOMY  1999            Social History:     Social History     Tobacco Use     Smoking status: Never Smoker     Smokeless tobacco: Never Used   Substance Use Topics     Alcohol use: Yes     Comment: occasionally       Marital Status: Has a boyfriend, no children  Living situation: Lives with boyfriend  Family support: no family in area  Vocational History: Works as a   Tobacco use: Never  Alcohol use: occasionally  Recreational drug use: none         Functional history:     Brenda Renee is independent with all aspects of  life.         Family History:     Family History   Problem Relation Age of Onset     Hypertension Mother      Arthritis Mother      Arrhythmia Mother      Hypertension Father      Cerebrovascular Disease Father      Liver Disease Brother         infection that caused liver problems     Arthritis Sister      Diabetes Sister      Neurologic Disorder Sister      Family History Negative Brother      Polycystic ovary syndrome Sister      ALS Other         Great aunt on paternal side     Cancer Maternal Aunt      Autism Spectrum Disorder Nephew      Multiple Sclerosis Paternal Uncle      Pancreatic Cancer Paternal Grandfather      Cancer Paternal Aunt      Cancer Maternal Aunt             Medications:     Current Outpatient Medications   Medication Sig Dispense Refill     albuterol (PROAIR HFA) 108 (90 Base) MCG/ACT inhaler Inhale 2 puffs into the lungs every 6 hours as needed 18 g 3     aspirin (ASA) 81 MG chewable tablet Take 81 mg by mouth daily       blood glucose (NO BRAND SPECIFIED) lancets standard Use to test blood sugar 3 times daily or as directed. 270 each 1     blood glucose (ONETOUCH VERIO IQ) test strip USE TO TEST BLOOD SUGAR 2 TIMES DAILY OR AS DIRECTED. 200 strip 2     Continuous Blood Gluc Sensor (FREESTYLE INESSA 14 DAY SENSOR)  MISC 1 kit every 14 days 2 each 11     COVID-19 At Home Antigen Test KIT 1 each by In Vitro route as needed (when having symptoms or exposure ONLY, tests max 1 per week) 1 kit 5     cyclobenzaprine (FLEXERIL) 10 MG tablet Take 1 tablet (10 mg) by mouth 3 times daily as needed 30 tablet 5     dulaglutide (TRULICITY) 0.75 MG/0.5ML pen Inject 0.75 mg Subcutaneous every 7 days 6 mL 1     fexofenadine (ALLEGRA) 180 MG tablet Take 1 tablet by mouth daily.       folic acid (FOLVITE) 400 MCG tablet Take 1 tablet (400 mcg) by mouth daily 90 tablet 3     hydrochlorothiazide (HYDRODIURIL) 12.5 MG tablet Take 1 tablet (12.5 mg) by mouth daily 90 tablet 0     HYDROcodone-acetaminophen (NORCO) 5-325 MG tablet Take 1 tablet by mouth every 6 hours as needed for moderate to severe pain 40 tablet 0     ibuprofen 200 MG capsule Take 200 mg by mouth as needed        losartan (COZAAR) 100 MG tablet Take 1 tablet (100 mg) by mouth daily 90 tablet 2     metFORMIN (GLUCOPHAGE XR) 500 MG 24 hr tablet TAKE 2 TABLETS BY MOUTH 2 TIMES DAILY WITH MEALS 360 tablet 1     OneTouch Delica Lancets 30G MISC USE TO TEST BLOOD SUGAR 3 TIMES DAILY OR AS DIRECTED. 300 each 1     Ostomy Supplies (SKIN TAC ADHESIVE BARRIER WIPE) MISC 1 pad every 14 days 50 each 3     pioglitazone (ACTOS) 15 MG tablet Take 1 tablet (15 mg) by mouth daily 90 tablet 1     rosuvastatin (CRESTOR) 5 MG tablet Take 1 tab PO DAILY 90 tablet 4     amphetamine-dextroamphetamine (ADDERALL XR) 5 MG 24 hr capsule Take 1 capsule (5 mg) by mouth daily (Patient not taking: Reported on 8/24/2022) 90 capsule 0     LIDODERM 5 % patch  (Patient not taking: Reported on 8/24/2022)       losartan-hydrochlorothiazide (HYZAAR) 100-12.5 MG tablet Take 1 tablet by mouth daily (Patient not taking: Reported on 8/24/2022) 90 tablet 1            Allergies:     Allergies   Allergen Reactions     Iodine I 131 Tositumomab Rash     Latex Rash     Sulfa Drugs Itching     Xray Dye [Contrast Dye] Itching      "Zithromax [Azithromycin Dihydrate] Rash              ROS:     A focused ROS is negative other than the symptoms noted above in the HPI.           Physical Examiniation:     VITAL SIGNS: Ht 1.638 m (5' 4.5\")   Wt 111.1 kg (245 lb)   BMI 41.40 kg/m    BMI: Estimated body mass index is 41.4 kg/m  as calculated from the following:    Height as of this encounter: 1.638 m (5' 4.5\").    Weight as of this encounter: 111.1 kg (245 lb).    Gen: NAD, pleasant and cooperative          Laboratory/Imaging:     Ref Range & Units 2 mo ago       Cyclic Citrullinated Peptide Antibody IgG <7.0 U/mL 3.8    Comment: Negative   Resulting Agency  [facility]      1 yr ago 3 yr ago 5 yr ago       CRP Inflammation 0.0 - 8.0 mg/L 9.7 High   21.0 High   25.0 High   12.0 High     Resulting Agency  [facility]          Component Ref Range & Units 2 mo ago 1 yr ago 3 yr ago    Vitamin D, Total (25-Hydroxy) 20 - 75 ug/L 47  >155 High  CM  36 CM    Resulting Agency  [facility]             Narrative          Component Ref Range & Units 2 mo ago 1 yr ago 2 yr ago 3 yr ago     Creatinine Urine mg/dL mg/dL 176  33  80  79     Albumin Urine mg/L mg/L 12  <5  6  17     Albumin Urine mg/g Cr 0.00 - 25.00 mg/g Cr 6.82  Unable to calculate due to low value R  7.55 R  21.09 R    Resulting Agency  [facility]              Specimen Collected: 06/06/22 11:22 AM Last Resulted: 06/06/22  4:42 PM            Ref Range & Units 2 mo ago   (6/6/22) 8 mo ago   (12/8/21) 11 mo ago   (9/1/21) 1 yr ago   (6/2/21) 1 yr ago   (3/3/21) 1 yr ago   (2/17/21) 1 yr ago   (9/11/20)     Hemoglobin A1C 0.0 - 5.6 % 8.8 High   8.5 High  CM  6.5 High  CM  8.4 High  R, CM  11.7 High  R, CM  12.3 High  R, CM  " 8.4 High  R, CM    Comment: Normal <5.7%   Prediabetes 5.7-6.4%     Diabetes 6.5% or higher             Assessment/Plan:     This is a 53 YO female with long-term symptoms related to COVID infection (1/2021) who presents today for assessment of her brain fog, depressive symptoms, and loss of taste/smell.  I've placed orders for ENT for taste/smell assessment, behavioral health for depressive symptoms, and neurospych evaluation to assess memory.  RTC after testing to review results.  She is in agreement with this plan.       Mary Mathew, DO  Physical Medicine & Rehabilitation    I spent a total of 26 minutes one the phone with Brenda Renee during today's office visit.     20 minutes spent on the date of the encounter doing chart review, history and exam, documentation and further activities as noted above

## 2022-09-05 ENCOUNTER — TELEPHONE (OUTPATIENT)
Dept: FAMILY MEDICINE | Facility: CLINIC | Age: 52
End: 2022-09-05

## 2022-09-05 NOTE — TELEPHONE ENCOUNTER
Reason for Call:  Other appointment    Detailed comments: patient called and would like to schedule a pre op physical and est new care with a provider near [place].  Isaías tamez.      Surgery date September 14.  Left breast nipple dishcqrge.    Please contact patient.  Thank you.    Phone Number Patient can be reached at: Home number on file [phone] (home)    Best Time: any    Can we leave a detailed message on this number? YES    Call taken on 9/5/2022 at 10:29 AM by Radha White

## 2022-09-07 ENCOUNTER — LAB (OUTPATIENT)
Dept: LAB | Facility: CLINIC | Age: 52
End: 2022-09-07
Payer: COMMERCIAL

## 2022-09-07 DIAGNOSIS — E11.65 TYPE 2 DIABETES MELLITUS WITH HYPERGLYCEMIA, WITHOUT LONG-TERM CURRENT USE OF INSULIN (H): ICD-10-CM

## 2022-09-07 LAB — HBA1C MFR BLD: 7 % (ref 0–5.6)

## 2022-09-07 PROCEDURE — 83036 HEMOGLOBIN GLYCOSYLATED A1C: CPT

## 2022-09-07 PROCEDURE — 36415 COLL VENOUS BLD VENIPUNCTURE: CPT

## 2022-09-09 ENCOUNTER — OFFICE VISIT (OUTPATIENT)
Dept: FAMILY MEDICINE | Facility: CLINIC | Age: 52
End: 2022-09-09
Payer: COMMERCIAL

## 2022-09-09 VITALS
SYSTOLIC BLOOD PRESSURE: 106 MMHG | TEMPERATURE: 98.4 F | HEART RATE: 78 BPM | BODY MASS INDEX: 41.64 KG/M2 | WEIGHT: 246.4 LBS | OXYGEN SATURATION: 98 % | DIASTOLIC BLOOD PRESSURE: 70 MMHG

## 2022-09-09 DIAGNOSIS — E66.01 MORBID OBESITY DUE TO EXCESS CALORIES (H): ICD-10-CM

## 2022-09-09 DIAGNOSIS — N64.52 NIPPLE DISCHARGE IN FEMALE: Primary | ICD-10-CM

## 2022-09-09 DIAGNOSIS — D50.9 IRON DEFICIENCY ANEMIA, UNSPECIFIED IRON DEFICIENCY ANEMIA TYPE: ICD-10-CM

## 2022-09-09 DIAGNOSIS — I10 ESSENTIAL HYPERTENSION WITH GOAL BLOOD PRESSURE LESS THAN 140/90: Chronic | ICD-10-CM

## 2022-09-09 DIAGNOSIS — E11.65 TYPE 2 DIABETES MELLITUS WITH HYPERGLYCEMIA, WITHOUT LONG-TERM CURRENT USE OF INSULIN (H): ICD-10-CM

## 2022-09-09 LAB
ANION GAP SERPL CALCULATED.3IONS-SCNC: 9 MMOL/L (ref 7–15)
BUN SERPL-MCNC: 15.5 MG/DL (ref 6–20)
CALCIUM SERPL-MCNC: 9.4 MG/DL (ref 8.6–10)
CHLORIDE SERPL-SCNC: 99 MMOL/L (ref 98–107)
CREAT SERPL-MCNC: 0.96 MG/DL (ref 0.51–0.95)
DEPRECATED HCO3 PLAS-SCNC: 30 MMOL/L (ref 22–29)
ERYTHROCYTE [DISTWIDTH] IN BLOOD BY AUTOMATED COUNT: 13.2 % (ref 10–15)
GFR SERPL CREATININE-BSD FRML MDRD: 70 ML/MIN/1.73M2
GLUCOSE SERPL-MCNC: 126 MG/DL (ref 70–99)
HCT VFR BLD AUTO: 38.6 % (ref 35–47)
HGB BLD-MCNC: 12.5 G/DL (ref 11.7–15.7)
MCH RBC QN AUTO: 29.6 PG (ref 26.5–33)
MCHC RBC AUTO-ENTMCNC: 32.4 G/DL (ref 31.5–36.5)
MCV RBC AUTO: 92 FL (ref 78–100)
PLATELET # BLD AUTO: 234 10E3/UL (ref 150–450)
POTASSIUM SERPL-SCNC: 4 MMOL/L (ref 3.4–5.3)
RBC # BLD AUTO: 4.22 10E6/UL (ref 3.8–5.2)
SODIUM SERPL-SCNC: 138 MMOL/L (ref 136–145)
WBC # BLD AUTO: 10.4 10E3/UL (ref 4–11)

## 2022-09-09 PROCEDURE — 93000 ELECTROCARDIOGRAM COMPLETE: CPT | Performed by: INTERNAL MEDICINE

## 2022-09-09 PROCEDURE — 99214 OFFICE O/P EST MOD 30 MIN: CPT | Performed by: INTERNAL MEDICINE

## 2022-09-09 PROCEDURE — 80048 BASIC METABOLIC PNL TOTAL CA: CPT | Performed by: INTERNAL MEDICINE

## 2022-09-09 PROCEDURE — 36415 COLL VENOUS BLD VENIPUNCTURE: CPT | Performed by: INTERNAL MEDICINE

## 2022-09-09 PROCEDURE — 85027 COMPLETE CBC AUTOMATED: CPT | Performed by: INTERNAL MEDICINE

## 2022-09-09 RX ORDER — FEXOFENADINE HCL 180 MG/1
180 TABLET ORAL
COMMUNITY
Start: 2022-08-10

## 2022-09-09 ASSESSMENT — ANXIETY QUESTIONNAIRES
7. FEELING AFRAID AS IF SOMETHING AWFUL MIGHT HAPPEN: MORE THAN HALF THE DAYS
GAD7 TOTAL SCORE: 15
GAD7 TOTAL SCORE: 15
IF YOU CHECKED OFF ANY PROBLEMS ON THIS QUESTIONNAIRE, HOW DIFFICULT HAVE THESE PROBLEMS MADE IT FOR YOU TO DO YOUR WORK, TAKE CARE OF THINGS AT HOME, OR GET ALONG WITH OTHER PEOPLE: VERY DIFFICULT
6. BECOMING EASILY ANNOYED OR IRRITABLE: MORE THAN HALF THE DAYS
8. IF YOU CHECKED OFF ANY PROBLEMS, HOW DIFFICULT HAVE THESE MADE IT FOR YOU TO DO YOUR WORK, TAKE CARE OF THINGS AT HOME, OR GET ALONG WITH OTHER PEOPLE?: VERY DIFFICULT
3. WORRYING TOO MUCH ABOUT DIFFERENT THINGS: MORE THAN HALF THE DAYS
5. BEING SO RESTLESS THAT IT IS HARD TO SIT STILL: SEVERAL DAYS
1. FEELING NERVOUS, ANXIOUS, OR ON EDGE: MORE THAN HALF THE DAYS
7. FEELING AFRAID AS IF SOMETHING AWFUL MIGHT HAPPEN: MORE THAN HALF THE DAYS
4. TROUBLE RELAXING: NEARLY EVERY DAY
2. NOT BEING ABLE TO STOP OR CONTROL WORRYING: NEARLY EVERY DAY

## 2022-09-09 ASSESSMENT — ENCOUNTER SYMPTOMS
ABDOMINAL PAIN: 0
UNEXPECTED WEIGHT CHANGE: 0
WHEEZING: 0
MYALGIAS: 1
WEAKNESS: 0
BRUISES/BLEEDS EASILY: 0
COUGH: 0
SLEEP DISTURBANCE: 1
SHORTNESS OF BREATH: 0
VOMITING: 0
FATIGUE: 1
DIFFICULTY URINATING: 0
DIARRHEA: 0
ARTHRALGIAS: 1
PALPITATIONS: 0
HEADACHES: 0
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
## 2022-10-05 ENCOUNTER — VIRTUAL VISIT (OUTPATIENT)
Dept: PHYSICAL MEDICINE AND REHAB | Facility: CLINIC | Age: 52
End: 2022-10-05
Payer: COMMERCIAL

## 2022-10-05 DIAGNOSIS — R06.02 SOB (SHORTNESS OF BREATH): Primary | ICD-10-CM

## 2022-10-05 PROCEDURE — 99213 OFFICE O/P EST LOW 20 MIN: CPT | Mod: TEL | Performed by: PHYSICAL MEDICINE & REHABILITATION

## 2022-10-05 ASSESSMENT — ANXIETY QUESTIONNAIRES
1. FEELING NERVOUS, ANXIOUS, OR ON EDGE: NEARLY EVERY DAY
GAD7 TOTAL SCORE: 19
IF YOU CHECKED OFF ANY PROBLEMS ON THIS QUESTIONNAIRE, HOW DIFFICULT HAVE THESE PROBLEMS MADE IT FOR YOU TO DO YOUR WORK, TAKE CARE OF THINGS AT HOME, OR GET ALONG WITH OTHER PEOPLE: EXTREMELY DIFFICULT
5. BEING SO RESTLESS THAT IT IS HARD TO SIT STILL: MORE THAN HALF THE DAYS
3. WORRYING TOO MUCH ABOUT DIFFERENT THINGS: NEARLY EVERY DAY
7. FEELING AFRAID AS IF SOMETHING AWFUL MIGHT HAPPEN: NEARLY EVERY DAY
2. NOT BEING ABLE TO STOP OR CONTROL WORRYING: NEARLY EVERY DAY
GAD7 TOTAL SCORE: 19
6. BECOMING EASILY ANNOYED OR IRRITABLE: MORE THAN HALF THE DAYS

## 2022-10-05 ASSESSMENT — PATIENT HEALTH QUESTIONNAIRE - PHQ9
5. POOR APPETITE OR OVEREATING: NEARLY EVERY DAY
SUM OF ALL RESPONSES TO PHQ QUESTIONS 1-9: 23

## 2022-10-05 NOTE — LETTER
10/5/2022       RE: Brenda Renee  [address] [place] [address]     Dear Colleague,    Thank you for referring your patient, Brenda Renee, to the [facility]IN[facility] [place] [facility]. Please see a copy of my visit note below.    Brenda is a 52 year old who is being evaluated via a billable telephone visit.      What phone number would you like to be contacted at? [phone]  How would you like to obtain your AVS? MyChart  Phone call duration: 12 minutes    [facility]   PM&R clinic note        Interval history:     Brenda Renee presents to clinic today for follow up.  She was last seen in this clinic 8/24/2022.  At that time we had discussed ENT, mental health, and neuropsych referrals.  She has not had any visits scheduled relating to this plan.  She did have breast surgery in September, 2022.  She reports that she continues to experience brain fog and difficulty expressing herself verbally.   She also reports ongoing SOB.  She notes this when she is physically active and having a conversation.  She has not had a recent CXR or PFTs.  Medications:  Current Outpatient Medications   Medication Sig Dispense Refill     albuterol (PROAIR HFA) 108 (90 Base) MCG/ACT inhaler Inhale 2 puffs into the lungs every 6 hours as needed 18 g 3     aspirin (ASA) 81 MG EC tablet Take 81 mg by mouth       blood glucose (NO BRAND SPECIFIED) lancets standard Use to test blood sugar 3 times daily or as directed. 270 each 1     blood glucose (ONETOUCH VERIO IQ) test strip USE TO TEST BLOOD SUGAR 2 TIMES DAILY OR AS DIRECTED. 200 strip 2     cyclobenzaprine (FLEXERIL) 10 MG tablet Take 1 tablet (10 mg) by mouth 3 times daily as needed 30 tablet 5     dulaglutide (TRULICITY) 0.75 MG/0.5ML pen Inject 0.75 mg Subcutaneous every 7 days 6 mL 1     fexofenadine (ALLEGRA) 180 MG tablet  Take 1 tablet by mouth daily.       folic acid (FOLVITE) 400 MCG tablet Take 1 tablet (400 mcg) by mouth daily 90 tablet 3     HYDROcodone-acetaminophen (NORCO) 5-325 MG tablet Take 1 tablet by mouth every 6 hours as needed for moderate to severe pain 40 tablet 0     ibuprofen 200 MG capsule Take 200 mg by mouth as needed        losartan-hydrochlorothiazide (HYZAAR) 100-12.5 MG tablet Take 1 tablet by mouth daily 90 tablet 1     metFORMIN (GLUCOPHAGE XR) 500 MG 24 hr tablet TAKE 2 TABLETS BY MOUTH 2 TIMES DAILY WITH MEALS 360 tablet 1     OneTouch Delica Lancets 30G MISC USE TO TEST BLOOD SUGAR 3 TIMES DAILY OR AS DIRECTED. 300 each 1     Ostomy Supplies (SKIN TAC ADHESIVE BARRIER WIPE) MISC 1 pad every 14 days 50 each 3     pioglitazone (ACTOS) 15 MG tablet Take 1 tablet (15 mg) by mouth daily 90 tablet 1     rosuvastatin (CRESTOR) 5 MG tablet Take 1 tab PO DAILY 90 tablet 4     Continuous Blood Gluc Sensor (FREESTYLE INESSA 14 DAY SENSOR) MISC 1 kit every 14 days (Patient not taking: Reported on 10/5/2022) 2 each 11     fexofenadine (ALLEGRA) 180 MG tablet Take 180 mg by mouth (Patient not taking: Reported on 10/5/2022)              Physical Exam:   There were no vitals taken for this visit.    Labs/Imaging:  Lab Results   Component Value Date    WBC 10.4 09/09/2022    HGB 12.5 09/09/2022    HCT 38.6 09/09/2022    MCV 92 09/09/2022     09/09/2022     Lab Results   Component Value Date     09/09/2022    POTASSIUM 4.0 09/09/2022    CHLORIDE 99 09/09/2022    CO2 30 (H) 09/09/2022     (H) 09/09/2022     Lab Results   Component Value Date    GFRESTIMATED 70 09/09/2022    GFRESTBLACK >90 02/17/2021     Lab Results   Component Value Date    AST 26 06/06/2022    ALT 40 06/06/2022    ALKPHOS 117 06/06/2022    BILITOTAL 0.4 06/06/2022     No results found for: INR  Lab Results   Component Value Date    BUN 15.5 09/09/2022    CR 0.96 (H) 09/09/2022          Assessment/Plan     53 YO female with ongoing  symptoms relating to long Covid.  Will try to facilitate neuropsych testing.  RTC after this evaluation, CXR, and PFTs are completed.    I spent a total of 12 minutes on the phone today with Brenda Renee during today's telephone visit.     9 minutes spent on the date of the encounter doing chart review, history and exam, documentation and further activities as noted above      Again, thank you for allowing me to participate in the care of your patient.      Sincerely,    Mary Mathew, DO

## 2022-10-05 NOTE — NURSING NOTE
Reviewed medications and allergies with patient     Patient states they are in [place] for today's virtual visit.     Melvi Singh, [facility]

## 2022-10-05 NOTE — PROGRESS NOTES
Brenda is a 52 year old who is being evaluated via a billable telephone visit.      What phone number would you like to be contacted at? [phone]  How would you like to obtain your AVS? Erinn  Phone call duration: 12 minutes    [facility]   PM&R clinic note        Interval history:     Brenda Renee presents to clinic today for follow up.  She was last seen in this clinic 8/24/2022.  At that time we had discussed ENT, mental health, and neuropsych referrals.  She has not had any visits scheduled relating to this plan.  She did have breast surgery in September, 2022.  She reports that she continues to experience brain fog and difficulty expressing herself verbally.   She also reports ongoing SOB.  She notes this when she is physically active and having a conversation.  She has not had a recent CXR or PFTs.  Medications:  Current Outpatient Medications   Medication Sig Dispense Refill     albuterol (PROAIR HFA) 108 (90 Base) MCG/ACT inhaler Inhale 2 puffs into the lungs every 6 hours as needed 18 g 3     aspirin (ASA) 81 MG EC tablet Take 81 mg by mouth       blood glucose (NO BRAND SPECIFIED) lancets standard Use to test blood sugar 3 times daily or as directed. 270 each 1     blood glucose (ONETOUCH VERIO IQ) test strip USE TO TEST BLOOD SUGAR 2 TIMES DAILY OR AS DIRECTED. 200 strip 2     cyclobenzaprine (FLEXERIL) 10 MG tablet Take 1 tablet (10 mg) by mouth 3 times daily as needed 30 tablet 5     dulaglutide (TRULICITY) 0.75 MG/0.5ML pen Inject 0.75 mg Subcutaneous every 7 days 6 mL 1     fexofenadine (ALLEGRA) 180 MG tablet Take 1 tablet by mouth daily.       folic acid (FOLVITE) 400 MCG tablet Take 1 tablet (400 mcg) by mouth daily 90 tablet 3     HYDROcodone-acetaminophen (NORCO) 5-325 MG tablet Take 1 tablet by mouth every 6 hours as needed for moderate to severe pain 40 tablet 0     ibuprofen 200 MG capsule Take 200 mg by mouth as needed         losartan-hydrochlorothiazide (HYZAAR) 100-12.5 MG tablet Take 1 tablet by mouth daily 90 tablet 1     metFORMIN (GLUCOPHAGE XR) 500 MG 24 hr tablet TAKE 2 TABLETS BY MOUTH 2 TIMES DAILY WITH MEALS 360 tablet 1     OneTouch Delica Lancets 30G MISC USE TO TEST BLOOD SUGAR 3 TIMES DAILY OR AS DIRECTED. 300 each 1     Ostomy Supplies (SKIN TAC ADHESIVE BARRIER WIPE) MISC 1 pad every 14 days 50 each 3     pioglitazone (ACTOS) 15 MG tablet Take 1 tablet (15 mg) by mouth daily 90 tablet 1     rosuvastatin (CRESTOR) 5 MG tablet Take 1 tab PO DAILY 90 tablet 4     Continuous Blood Gluc Sensor (FREESTYLE INESSA 14 DAY SENSOR) MISC 1 kit every 14 days (Patient not taking: Reported on 10/5/2022) 2 each 11     fexofenadine (ALLEGRA) 180 MG tablet Take 180 mg by mouth (Patient not taking: Reported on 10/5/2022)              Physical Exam:   There were no vitals taken for this visit.    Labs/Imaging:  Lab Results   Component Value Date    WBC 10.4 09/09/2022    HGB 12.5 09/09/2022    HCT 38.6 09/09/2022    MCV 92 09/09/2022     09/09/2022     Lab Results   Component Value Date     09/09/2022    POTASSIUM 4.0 09/09/2022    CHLORIDE 99 09/09/2022    CO2 30 (H) 09/09/2022     (H) 09/09/2022     Lab Results   Component Value Date    GFRESTIMATED 70 09/09/2022    GFRESTBLACK >90 02/17/2021     Lab Results   Component Value Date    AST 26 06/06/2022    ALT 40 06/06/2022    ALKPHOS 117 06/06/2022    BILITOTAL 0.4 06/06/2022     No results found for: INR  Lab Results   Component Value Date    BUN 15.5 09/09/2022    CR 0.96 (H) 09/09/2022            Assessment/Plan     51 YO female with ongoing symptoms relating to long Covid.  Will try to facilitate neuropsych testing.  RTC after this evaluation, CXR, and PFTs are completed.      Mary Mathew, DO  Physical Medicine & Rehabilitation      I spent a total of 12 minutes on the phone today with Brenda Renee during today's telephone visit.     9 minutes spent on the  date of the encounter doing chart review, history and exam, documentation and further activities as noted above

## 2022-10-15 ENCOUNTER — HEALTH MAINTENANCE LETTER (OUTPATIENT)
Age: 52
End: 2022-10-15

## 2022-11-21 DIAGNOSIS — E11.65 TYPE 2 DIABETES MELLITUS WITH HYPERGLYCEMIA (H): ICD-10-CM

## 2022-11-21 DIAGNOSIS — I10 ESSENTIAL HYPERTENSION WITH GOAL BLOOD PRESSURE LESS THAN 140/90: ICD-10-CM

## 2022-11-21 DIAGNOSIS — E11.65 TYPE 2 DIABETES MELLITUS WITH HYPERGLYCEMIA, WITHOUT LONG-TERM CURRENT USE OF INSULIN (H): ICD-10-CM

## 2022-11-22 RX ORDER — PIOGLITAZONEHYDROCHLORIDE 15 MG/1
TABLET ORAL
Qty: 30 TABLET | Refills: 1 | Status: SHIPPED | OUTPATIENT
Start: 2022-11-22

## 2022-11-22 RX ORDER — METFORMIN HCL 500 MG
TABLET, EXTENDED RELEASE 24 HR ORAL
Qty: 120 TABLET | Refills: 1 | Status: SHIPPED | OUTPATIENT
Start: 2022-11-22

## 2022-11-22 RX ORDER — LOSARTAN POTASSIUM AND HYDROCHLOROTHIAZIDE 12.5; 1 MG/1; MG/1
TABLET ORAL
Qty: 30 TABLET | Refills: 1 | Status: SHIPPED | OUTPATIENT
Start: 2022-11-22

## 2022-11-22 RX ORDER — LANCETS 30 GAUGE
EACH MISCELLANEOUS
Qty: 300 EACH | Refills: 0 | Status: SHIPPED | OUTPATIENT
Start: 2022-11-22

## 2022-11-22 NOTE — TELEPHONE ENCOUNTER
Labs due, PCP on file no longer with [facility]. Will route to doc of the day to review and advise on kia refill. Will also route to MA-TC to assist with scheduling.     Magalis LENZ RN

## 2022-12-27 ENCOUNTER — TELEPHONE (OUTPATIENT)
Dept: FAMILY MEDICINE | Facility: CLINIC | Age: 52
End: 2022-12-27

## 2022-12-27 NOTE — TELEPHONE ENCOUNTER
Summary:    Patient is due/failing the following:   Eye exam    Reviewed:    [] CARE EVERYWHERE  [] LAST OV NOTE   [] FYI TAB  [] MYCHART ACTIVE?  [] LAST PANEL ENCOUNTER  [] FUTURE APPTS  [] IMMUNIZATIONS  [] Media Tab            Action needed:   Patient needs office visit for eye exam.    Type of outreach:    report printed and will have provider sign off on retinopathy status                                                                               Mary Frances/TANIA  [place]---[facility]

## 2022-12-29 NOTE — TELEPHONE ENCOUNTER
Received eye report from eye clinic, negative for retinopathy, report sent to abstraction    Mary Frances/[facility]---[facility]     [id number]

## 2023-01-30 NOTE — TELEPHONE ENCOUNTER
Patient currently passing eye exam per quality    Mary Frances/[facility]---[facility]

## 2023-02-26 DIAGNOSIS — I10 ESSENTIAL HYPERTENSION WITH GOAL BLOOD PRESSURE LESS THAN 140/90: ICD-10-CM

## 2023-02-27 RX ORDER — LOSARTAN POTASSIUM AND HYDROCHLOROTHIAZIDE 12.5; 1 MG/1; MG/1
TABLET ORAL
Qty: 30 TABLET | Refills: 1 | OUTPATIENT
Start: 2023-02-27

## 2023-02-27 NOTE — TELEPHONE ENCOUNTER
Routing refill request to provider for review/approval because:  Sari given x1 and patient did not follow up, please advise    Needs to establish care with a new provider  Jaswinder LOWERY RN, BSN

## 2023-02-28 NOTE — TELEPHONE ENCOUNTER
Patient has followed Julienne to new practice. She will let pharmacy know to send request to new location.  Mila Melendez,

## 2023-03-26 ENCOUNTER — HEALTH MAINTENANCE LETTER (OUTPATIENT)
Age: 53
End: 2023-03-26

## 2023-08-20 ENCOUNTER — HEALTH MAINTENANCE LETTER (OUTPATIENT)
Age: 53
End: 2023-08-20

## 2023-10-29 ENCOUNTER — HEALTH MAINTENANCE LETTER (OUTPATIENT)
Age: 53
End: 2023-10-29

## 2024-05-26 ENCOUNTER — HEALTH MAINTENANCE LETTER (OUTPATIENT)
Age: 54
End: 2024-05-26

## 2024-08-04 ENCOUNTER — HEALTH MAINTENANCE LETTER (OUTPATIENT)
Age: 54
End: 2024-08-04

## 2024-10-13 ENCOUNTER — HEALTH MAINTENANCE LETTER (OUTPATIENT)
Age: 54
End: 2024-10-13

## 2024-12-21 ENCOUNTER — HEALTH MAINTENANCE LETTER (OUTPATIENT)
Age: 54
End: 2024-12-21

## 2025-07-05 ENCOUNTER — HEALTH MAINTENANCE LETTER (OUTPATIENT)
Age: 55
End: 2025-07-05